# Patient Record
Sex: FEMALE | Race: WHITE | NOT HISPANIC OR LATINO | Employment: OTHER | URBAN - METROPOLITAN AREA
[De-identification: names, ages, dates, MRNs, and addresses within clinical notes are randomized per-mention and may not be internally consistent; named-entity substitution may affect disease eponyms.]

---

## 2017-03-03 ENCOUNTER — HOSPITAL ENCOUNTER (OUTPATIENT)
Dept: RADIOLOGY | Facility: MEDICAL CENTER | Age: 52
End: 2017-03-03

## 2017-03-14 ENCOUNTER — HOSPITAL ENCOUNTER (OUTPATIENT)
Dept: RADIOLOGY | Facility: MEDICAL CENTER | Age: 52
End: 2017-03-14
Attending: OTOLARYNGOLOGY
Payer: COMMERCIAL

## 2017-03-14 DIAGNOSIS — E04.2 NONTOXIC MULTINODULAR GOITER: ICD-10-CM

## 2017-03-14 DIAGNOSIS — C82.81 OTHER TYPE OF FOLLICULAR LYMPHOMA OF LYMPH NODES OF NECK (HCC): ICD-10-CM

## 2017-03-14 PROCEDURE — 88112 CYTOPATH CELL ENHANCE TECH: CPT | Mod: 91

## 2017-03-14 PROCEDURE — 10022 HCHG FINE NEEDLE ASP W/IMAGING GUIDANCE: CPT

## 2017-03-14 PROCEDURE — 76942 ECHO GUIDE FOR BIOPSY: CPT

## 2017-03-14 NOTE — PROGRESS NOTES
US guided bilateral thyroid nodule fine needle aspiration done by Dr. Serra; bilateral anterior aspect of neck access site; 2 jars of cytolyt obtained and sent to pathology lab; pt tolerated the procedure well; pt hemodynamically stable pre/intra/post procedure; all questions and concerns answered prior to being d/c; patient provided with appropriate education for procedure; pt d/c home.

## 2017-03-17 ENCOUNTER — HOSPITAL ENCOUNTER (OUTPATIENT)
Dept: RADIOLOGY | Facility: MEDICAL CENTER | Age: 52
End: 2017-03-17
Attending: OTOLARYNGOLOGY
Payer: COMMERCIAL

## 2017-03-17 DIAGNOSIS — E04.2 NONTOXIC MULTINODULAR GOITER: ICD-10-CM

## 2017-03-28 ENCOUNTER — HOSPITAL ENCOUNTER (OUTPATIENT)
Dept: RADIOLOGY | Facility: MEDICAL CENTER | Age: 52
End: 2017-03-28
Attending: OTOLARYNGOLOGY
Payer: COMMERCIAL

## 2017-03-28 PROCEDURE — 70491 CT SOFT TISSUE NECK W/DYE: CPT

## 2017-03-28 PROCEDURE — 700117 HCHG RX CONTRAST REV CODE 255: Performed by: OTOLARYNGOLOGY

## 2017-03-28 RX ADMIN — IOHEXOL 100 ML: 350 INJECTION, SOLUTION INTRAVENOUS at 11:37

## 2019-08-11 ENCOUNTER — HOSPITAL ENCOUNTER (INPATIENT)
Facility: MEDICAL CENTER | Age: 54
LOS: 4 days | DRG: 300 | End: 2019-08-15
Attending: EMERGENCY MEDICINE | Admitting: INTERNAL MEDICINE
Payer: COMMERCIAL

## 2019-08-11 DIAGNOSIS — I82.422 ACUTE DEEP VEIN THROMBOSIS (DVT) OF ILIAC VEIN OF LEFT LOWER EXTREMITY (HCC): ICD-10-CM

## 2019-08-11 DIAGNOSIS — I82.492 DEEP VEIN THROMBOSIS (DVT) OF OTHER VEIN OF LEFT LOWER EXTREMITY, UNSPECIFIED CHRONICITY (HCC): ICD-10-CM

## 2019-08-11 PROBLEM — I82.409 DVT (DEEP VENOUS THROMBOSIS) (HCC): Status: ACTIVE | Noted: 2019-08-11

## 2019-08-11 PROBLEM — E11.65 DIABETES MELLITUS WITH HYPERGLYCEMIA (HCC): Status: ACTIVE | Noted: 2019-08-11

## 2019-08-11 PROBLEM — E03.9 HYPOTHYROIDISM: Status: ACTIVE | Noted: 2019-08-11

## 2019-08-11 PROBLEM — N17.9 ACUTE KIDNEY FAILURE (HCC): Status: ACTIVE | Noted: 2019-08-11

## 2019-08-11 PROCEDURE — A9270 NON-COVERED ITEM OR SERVICE: HCPCS | Performed by: INTERNAL MEDICINE

## 2019-08-11 PROCEDURE — 85303 CLOT INHIBIT PROT C ACTIVITY: CPT

## 2019-08-11 PROCEDURE — 700102 HCHG RX REV CODE 250 W/ 637 OVERRIDE(OP): Performed by: INTERNAL MEDICINE

## 2019-08-11 PROCEDURE — 36415 COLL VENOUS BLD VENIPUNCTURE: CPT

## 2019-08-11 PROCEDURE — 81241 F5 GENE: CPT

## 2019-08-11 PROCEDURE — 770020 HCHG ROOM/CARE - TELE (206)

## 2019-08-11 PROCEDURE — 85306 CLOT INHIBIT PROT S FREE: CPT

## 2019-08-11 PROCEDURE — 99223 1ST HOSP IP/OBS HIGH 75: CPT | Performed by: INTERNAL MEDICINE

## 2019-08-11 PROCEDURE — 85300 ANTITHROMBIN III ACTIVITY: CPT

## 2019-08-11 PROCEDURE — 99285 EMERGENCY DEPT VISIT HI MDM: CPT

## 2019-08-11 PROCEDURE — 85301 ANTITHROMBIN III ANTIGEN: CPT

## 2019-08-11 RX ORDER — HYDROMORPHONE HYDROCHLORIDE 1 MG/ML
0.5 INJECTION, SOLUTION INTRAMUSCULAR; INTRAVENOUS; SUBCUTANEOUS
Status: DISCONTINUED | OUTPATIENT
Start: 2019-08-11 | End: 2019-08-15 | Stop reason: HOSPADM

## 2019-08-11 RX ORDER — PROMETHAZINE HYDROCHLORIDE 25 MG/1
12.5-25 TABLET ORAL EVERY 4 HOURS PRN
Status: DISCONTINUED | OUTPATIENT
Start: 2019-08-11 | End: 2019-08-15 | Stop reason: HOSPADM

## 2019-08-11 RX ORDER — BISACODYL 10 MG
10 SUPPOSITORY, RECTAL RECTAL
Status: DISCONTINUED | OUTPATIENT
Start: 2019-08-11 | End: 2019-08-15 | Stop reason: HOSPADM

## 2019-08-11 RX ORDER — OMEPRAZOLE 20 MG/1
20 CAPSULE, DELAYED RELEASE ORAL DAILY
Status: DISCONTINUED | OUTPATIENT
Start: 2019-08-12 | End: 2019-08-11

## 2019-08-11 RX ORDER — ENALAPRILAT 1.25 MG/ML
1.25 INJECTION INTRAVENOUS EVERY 6 HOURS PRN
Status: DISCONTINUED | OUTPATIENT
Start: 2019-08-11 | End: 2019-08-15 | Stop reason: HOSPADM

## 2019-08-11 RX ORDER — GLIPIZIDE 5 MG/1
5 TABLET ORAL EVERY MORNING
COMMUNITY
End: 2021-06-15

## 2019-08-11 RX ORDER — ONDANSETRON 2 MG/ML
4 INJECTION INTRAMUSCULAR; INTRAVENOUS EVERY 4 HOURS PRN
Status: DISCONTINUED | OUTPATIENT
Start: 2019-08-11 | End: 2019-08-15 | Stop reason: HOSPADM

## 2019-08-11 RX ORDER — IBUPROFEN 200 MG
600 TABLET ORAL
Status: ON HOLD | COMMUNITY
End: 2019-08-15

## 2019-08-11 RX ORDER — AMOXICILLIN 250 MG
2 CAPSULE ORAL 2 TIMES DAILY
Status: DISCONTINUED | OUTPATIENT
Start: 2019-08-12 | End: 2019-08-15 | Stop reason: HOSPADM

## 2019-08-11 RX ORDER — OXYCODONE HYDROCHLORIDE 5 MG/1
5 TABLET ORAL
Status: DISCONTINUED | OUTPATIENT
Start: 2019-08-11 | End: 2019-08-13

## 2019-08-11 RX ORDER — ACETAMINOPHEN 325 MG/1
650 TABLET ORAL EVERY 6 HOURS PRN
Status: DISCONTINUED | OUTPATIENT
Start: 2019-08-11 | End: 2019-08-15 | Stop reason: HOSPADM

## 2019-08-11 RX ORDER — POLYETHYLENE GLYCOL 3350 17 G/17G
1 POWDER, FOR SOLUTION ORAL
Status: DISCONTINUED | OUTPATIENT
Start: 2019-08-11 | End: 2019-08-15 | Stop reason: HOSPADM

## 2019-08-11 RX ORDER — PROMETHAZINE HYDROCHLORIDE 25 MG/1
12.5-25 SUPPOSITORY RECTAL EVERY 4 HOURS PRN
Status: DISCONTINUED | OUTPATIENT
Start: 2019-08-11 | End: 2019-08-15 | Stop reason: HOSPADM

## 2019-08-11 RX ORDER — LEVOTHYROXINE SODIUM 0.1 MG/1
100 TABLET ORAL
COMMUNITY

## 2019-08-11 RX ORDER — LEVOTHYROXINE SODIUM 0.1 MG/1
100 TABLET ORAL
Status: DISCONTINUED | OUTPATIENT
Start: 2019-08-12 | End: 2019-08-15 | Stop reason: HOSPADM

## 2019-08-11 RX ORDER — ONDANSETRON 4 MG/1
4 TABLET, ORALLY DISINTEGRATING ORAL EVERY 4 HOURS PRN
Status: DISCONTINUED | OUTPATIENT
Start: 2019-08-11 | End: 2019-08-15 | Stop reason: HOSPADM

## 2019-08-11 RX ORDER — OXYCODONE HYDROCHLORIDE 10 MG/1
10 TABLET ORAL
Status: DISCONTINUED | OUTPATIENT
Start: 2019-08-11 | End: 2019-08-13

## 2019-08-11 RX ADMIN — OXYCODONE HYDROCHLORIDE 5 MG: 5 TABLET ORAL at 22:11

## 2019-08-11 ASSESSMENT — ENCOUNTER SYMPTOMS
HEADACHES: 0
FEVER: 0
PALPITATIONS: 0
TINGLING: 0
STRIDOR: 0
FALLS: 0
NAUSEA: 0
CHILLS: 0
SPUTUM PRODUCTION: 0
LOSS OF CONSCIOUSNESS: 0
DIARRHEA: 0
WEAKNESS: 0
MYALGIAS: 0
SHORTNESS OF BREATH: 0
ABDOMINAL PAIN: 0
DIZZINESS: 0
CONSTIPATION: 0
VOMITING: 0
COUGH: 0
DEPRESSION: 0

## 2019-08-12 PROBLEM — N17.9 ACUTE KIDNEY FAILURE (HCC): Status: RESOLVED | Noted: 2019-08-11 | Resolved: 2019-08-12

## 2019-08-12 LAB
ANION GAP SERPL CALC-SCNC: 10 MMOL/L (ref 0–11.9)
BUN SERPL-MCNC: 17 MG/DL (ref 8–22)
CALCIUM SERPL-MCNC: 9.2 MG/DL (ref 8.5–10.5)
CHLORIDE SERPL-SCNC: 102 MMOL/L (ref 96–112)
CO2 SERPL-SCNC: 25 MMOL/L (ref 20–33)
CREAT SERPL-MCNC: 1.09 MG/DL (ref 0.5–1.4)
ERYTHROCYTE [DISTWIDTH] IN BLOOD BY AUTOMATED COUNT: 43.1 FL (ref 35.9–50)
GLUCOSE BLD-MCNC: 193 MG/DL (ref 65–99)
GLUCOSE BLD-MCNC: 195 MG/DL (ref 65–99)
GLUCOSE BLD-MCNC: 217 MG/DL (ref 65–99)
GLUCOSE BLD-MCNC: 233 MG/DL (ref 65–99)
GLUCOSE SERPL-MCNC: 251 MG/DL (ref 65–99)
HCT VFR BLD AUTO: 40.3 % (ref 37–47)
HGB BLD-MCNC: 13.2 G/DL (ref 12–16)
MCH RBC QN AUTO: 28.9 PG (ref 27–33)
MCHC RBC AUTO-ENTMCNC: 32.8 G/DL (ref 33.6–35)
MCV RBC AUTO: 88.4 FL (ref 81.4–97.8)
PLATELET # BLD AUTO: 128 K/UL (ref 164–446)
PMV BLD AUTO: 9.2 FL (ref 9–12.9)
POTASSIUM SERPL-SCNC: 3.9 MMOL/L (ref 3.6–5.5)
RBC # BLD AUTO: 4.56 M/UL (ref 4.2–5.4)
SODIUM SERPL-SCNC: 137 MMOL/L (ref 135–145)
WBC # BLD AUTO: 6.3 K/UL (ref 4.8–10.8)

## 2019-08-12 PROCEDURE — 85027 COMPLETE CBC AUTOMATED: CPT

## 2019-08-12 PROCEDURE — 99232 SBSQ HOSP IP/OBS MODERATE 35: CPT | Performed by: INTERNAL MEDICINE

## 2019-08-12 PROCEDURE — 770020 HCHG ROOM/CARE - TELE (206)

## 2019-08-12 PROCEDURE — 36415 COLL VENOUS BLD VENIPUNCTURE: CPT

## 2019-08-12 PROCEDURE — 700102 HCHG RX REV CODE 250 W/ 637 OVERRIDE(OP): Performed by: INTERNAL MEDICINE

## 2019-08-12 PROCEDURE — 700111 HCHG RX REV CODE 636 W/ 250 OVERRIDE (IP): Performed by: INTERNAL MEDICINE

## 2019-08-12 PROCEDURE — 82962 GLUCOSE BLOOD TEST: CPT | Mod: 91

## 2019-08-12 PROCEDURE — A9270 NON-COVERED ITEM OR SERVICE: HCPCS | Performed by: INTERNAL MEDICINE

## 2019-08-12 PROCEDURE — 80048 BASIC METABOLIC PNL TOTAL CA: CPT

## 2019-08-12 RX ADMIN — INSULIN HUMAN 3 UNITS: 100 INJECTION, SOLUTION PARENTERAL at 12:28

## 2019-08-12 RX ADMIN — ENOXAPARIN SODIUM 80 MG: 100 INJECTION SUBCUTANEOUS at 17:13

## 2019-08-12 RX ADMIN — SENNOSIDES, DOCUSATE SODIUM 2 TABLET: 50; 8.6 TABLET, FILM COATED ORAL at 17:13

## 2019-08-12 RX ADMIN — OXYCODONE HYDROCHLORIDE 5 MG: 5 TABLET ORAL at 07:40

## 2019-08-12 RX ADMIN — INSULIN HUMAN 6 UNITS: 100 INJECTION, SOLUTION PARENTERAL at 08:22

## 2019-08-12 RX ADMIN — INSULIN HUMAN 6 UNITS: 100 INJECTION, SOLUTION PARENTERAL at 17:11

## 2019-08-12 RX ADMIN — INSULIN HUMAN 3 UNITS: 100 INJECTION, SOLUTION PARENTERAL at 21:13

## 2019-08-12 RX ADMIN — OXYCODONE HYDROCHLORIDE 5 MG: 5 TABLET ORAL at 16:03

## 2019-08-12 RX ADMIN — LEVOTHYROXINE SODIUM 100 MCG: 100 TABLET ORAL at 05:50

## 2019-08-12 RX ADMIN — POLYETHYLENE GLYCOL 3350 1 PACKET: 17 POWDER, FOR SOLUTION ORAL at 22:06

## 2019-08-12 RX ADMIN — ENOXAPARIN SODIUM 80 MG: 100 INJECTION SUBCUTANEOUS at 05:50

## 2019-08-12 RX ADMIN — OXYCODONE HYDROCHLORIDE 5 MG: 5 TABLET ORAL at 21:02

## 2019-08-12 RX ADMIN — SENNOSIDES, DOCUSATE SODIUM 2 TABLET: 50; 8.6 TABLET, FILM COATED ORAL at 05:50

## 2019-08-12 NOTE — H&P
Hospital Medicine History & Physical Note    Date of Service  8/11/2019    Primary Care Physician  FARIDEH Granados    Consultants  None    Code Status  Full    Chief Complaint  Left leg swelling    History of Presenting Illness  54 y.o. female who presented 8/11/2019 with left leg swelling.  She states on Friday morning she woke up with a swollen left leg and mild pressure associated with the swelling.  This continued to worsen so later that day she went to urgent care, was sent to the emergency department where they did an ultrasound that was negative so she was sent home.  Pain and swelling continued to worsen and today she states she was walk so she went back to the emergency department.  Emergency department was in [place], she did have a work-up there was noted to have a profound left clot encompassing the entire left leg.  There they felt she may need an IVC filter so she was transferred here.  I did discuss the case including labs and imaging with the ER physician.    Review of Systems  Review of Systems   Constitutional: Negative for chills, fever and malaise/fatigue.   HENT: Negative for congestion.    Respiratory: Negative for cough, sputum production, shortness of breath and stridor.    Cardiovascular: Positive for leg swelling (left ). Negative for chest pain and palpitations.   Gastrointestinal: Negative for abdominal pain, constipation, diarrhea, nausea and vomiting.   Genitourinary: Negative for dysuria and urgency.   Musculoskeletal: Negative for falls and myalgias.        Left leg pain   Neurological: Negative for dizziness, tingling, loss of consciousness, weakness and headaches.   Psychiatric/Behavioral: Negative for depression and suicidal ideas.   All other systems reviewed and are negative.      Past Medical History   has a past medical history of Acid reflux, Anesthesia, Cancer (1997), Personal history of venous thrombosis and embolism, Snoring, Tuberculosis, and Unspecified disorder of  thyroid.    Surgical History   has a past surgical history that includes lymph node excision (1997); [facility] cath, port-a-cath (1997); edelmira by laparoscopy (3/1/2012); ankle arthroscopy (12/11/2013); and ankle ligament reconstruction (12/11/2013).     Family History  family history includes Cancer in her unknown relative; Diabetes in her unknown relative.     Social History   reports that she has never smoked. She has never used smokeless tobacco. She reports that she does not drink alcohol or use drugs.    Allergies  Allergies   Allergen Reactions   • Compazine      agitation   • Iodine Hives   • Morphine Vomiting   • Penicillins      Reaction unknown   • Tape Rash     Paper tape OK   • Vicodin [Hydrocodone-Acetaminophen] Itching       Medications  Prior to Admission Medications   Prescriptions Last Dose Informant Patient Reported? Taking?   omeprazole (PRILOSEC) 20 MG CPDR  Patient Yes No   Sig: Take 20 mg by mouth every day. Indications: Gastroesophageal Reflux Disease with Current Symptoms      Facility-Administered Medications: None       Physical Exam  Temp:  [38.1 °C (100.5 °F)] 38.1 °C (100.5 °F)  Pulse:  [99] 99  Resp:  [18] 18  BP: (162)/(86) 162/86  SpO2:  [95 %] 95 %    Physical Exam   Constitutional: She is oriented to person, place, and time. She appears well-developed. She is cooperative. No distress.   HENT:   Head: Normocephalic and atraumatic. Not macrocephalic and not microcephalic. Head is without raccoon's eyes and without Figueroa's sign.   Right Ear: External ear normal.   Left Ear: External ear normal.   Mouth/Throat: Oropharynx is clear and moist. No oropharyngeal exudate.   Eyes: Conjunctivae are normal. Right eye exhibits no discharge. Left eye exhibits no discharge. No scleral icterus.   Neck: Neck supple. No tracheal deviation present.   Cardiovascular: Normal rate, regular rhythm and intact distal pulses. Exam reveals no gallop, no distant heart sounds and no friction rub.   No murmur  heard.  Pulmonary/Chest: Effort normal. No accessory muscle usage or stridor. No tachypnea and no bradypnea. No respiratory distress. She has no decreased breath sounds. She has no wheezes. She has no rhonchi. She has no rales. She exhibits no tenderness.   Abdominal: Soft. Bowel sounds are normal. She exhibits no distension. There is no hepatosplenomegaly, splenomegaly or hepatomegaly. There is no tenderness. There is no rebound and no guarding.   Musculoskeletal: Normal range of motion. She exhibits edema (left leg) and tenderness (left leg).   Lymphadenopathy:     She has no cervical adenopathy.   Neurological: She is alert and oriented to person, place, and time. No cranial nerve deficit. She displays no seizure activity.   Skin: Skin is warm, dry and intact. No rash noted. She is not diaphoretic. There is erythema (left leg). No pallor.   Psychiatric: She has a normal mood and affect. Her speech is normal and behavior is normal. Judgment and thought content normal. Cognition and memory are normal.   Nursing note and vitals reviewed.      Laboratory:    Labs here are pending, I was unable to locate the labs in the chart from the transferring facility, per ER note however the glucose is 282 and the creatinine was 1.1      No results for input(s): ALTSGPT, ASTSGOT, ALKPHOSPHAT, TBILIRUBIN, DBILIRUBIN, GAMMAGT, AMYLASE, LIPASE, ALB, PREALBUMIN, GLUCOSE in the last 72 hours.      No results for input(s): NTPROBNP in the last 72 hours.      No results for input(s): TROPONINT in the last 72 hours.    Urinalysis:    No results found     Imaging:  No orders to display         Assessment/Plan:  I anticipate this patient will require at least two midnights for appropriate medical management, necessitating inpatient admission.    * DVT (deep venous thrombosis) (HCC)  Assessment & Plan  -Significant DVT in her left leg  -She does state her son is diagnosed with factor V Leiden  -She also states her father had multiple clots  during his lifetime before passing away, the first was at 17  -Start hypercoagulable work-up  -Start full dose Lovenox  -Profound clot, does have a risk of worsening, she does require close monitoring    Hypothyroidism  Assessment & Plan  -Continue home Synthroid at 100 mcg daily  -No recent TSH  -Patient does not appear hyper or hypothyroid    Acute kidney failure (HCC)  Assessment & Plan  -Noted on labs from outlying facility  -At this point, she does not appear overly dehydrated  -She is tolerating a diet  -I do not feel she needs IV fluids at this time    Diabetes mellitus with hyperglycemia (HCC)  Assessment & Plan  -Hold home glipizide  -Start insulin sliding scale  -Adjust as needed      VTE prophylaxis: Lovenox

## 2019-08-12 NOTE — PROGRESS NOTES
2 RN skin check with CORINE Carvalho.    Pt skin is intact.    Redness and swelling on LLE d/t newly diagnosed DVT    Draw sheet and pillows for positioning. Pt turns self side to side.

## 2019-08-12 NOTE — ED PROVIDER NOTES
ED Provider Note    Scribed for Maya Gallo M.D. by Ronny Mccartney. 8/11/2019, 8:08 PM.    Primary care provider: FARIDEH Granados  Means of arrival: Aali[place]ah  History obtained from: Patient  History limited by: None    CHIEF COMPLAINT  Chief Complaint   Patient presents with   • Leg Pain   • Leg Swelling       [facility]  Haroldo Cameron is a 54 y.o. female who presents to the Emergency Department with acute, worsening left leg swelling onset 2 days ago. Patient states that she went to  last Friday after she woke up with leg swelling and was sent to ED. Per patient, US at that time showed no signs of DVT and was sent home. The next day she returned to  after the swelling increased to the point she could not walk and was sent here. Subcutaneous Lovenox was administered PTA and US from  showed sever, extensive DVT in the left lower extremity. Upon arrival patient has associated fever, but denies any associated shortness of breath of chest pain. There are no known alleviating or exacerbating factors. Patient states that she does not had a history of blood clots, however she notes that her dad did. She notes that she had just recently started being medicated for diabetes.     REVIEW OF SYSTEMS  Pertinent positives include left leg swelling and fever. Pertinent negatives include no chest pain or shortness of breath. As above, all other systems reviewed and are negative.   See HPI for further details.     PAST MEDICAL HISTORY  Past Medical History:   Diagnosis Date   • Acid reflux    • Anesthesia     post op n/v   • Cancer 1997    Hodgkins Lymphoma   • Personal history of venous thrombosis and embolism     arm, due to IV from  chemo   • Snoring    • Tuberculosis     skin test (+), cxr (-)   • Unspecified disorder of thyroid     hypothyroid       SURGICAL HISTORY  Past Surgical History:   Procedure Laterality Date   • ANKLE ARTHROSCOPY  12/11/2013    Performed by Yao Del Cid M.D. at [facility]  "[place] ORS   • ANKLE LIGAMENT RECONSTRUCTION  12/11/2013    Performed by Yao Del Cid M.D. at SURGERY [facility] ORS   • RAUL BY LAPAROSCOPY  3/1/2012    Performed by KELLY QUINTERO at SURGERY [facility]   • LYMPH NODE EXCISION  1997    neck   • CATH, PORT-A-CATH  1997       SOCIAL HISTORY  Social History     Tobacco Use   • Smoking status: Never Smoker   • Smokeless tobacco: Never Used   Substance Use Topics   • Alcohol use: No   • Drug use: No      Social History     Substance and Sexual Activity   Drug Use No       FAMILY HISTORY  Family History   Problem Relation Age of Onset   • Cancer Unknown    • Diabetes Unknown        CURRENT MEDICATIONS  Home Medications     Reviewed by Roberto Gant (Pharmacy Tech) on 08/11/19 at 2054  Med List Status: Complete   Medication Last Dose Status   glipiZIDE (GLUCOTROL) 5 MG Tab 8/8/2019 Active   ibuprofen (MOTRIN) 200 MG Tab unknown Active   levothyroxine (SYNTHROID) 100 MCG Tab 8/8/2019 Active                ALLERGIES  Allergies   Allergen Reactions   • Compazine      agitation   • Iodine Hives   • Morphine Vomiting   • Penicillins      Reaction unknown   • Tape Rash     Paper tape OK   • Vicodin [Hydrocodone-Acetaminophen] Itching       PHYSICAL EXAM  VITAL SIGNS: BP (!) 162/86   Pulse 99   Temp (!) 38.1 °C (100.5 °F) (Temporal)   Resp 18   Ht 1.676 m (5' 6\")   Wt 81.6 kg (180 lb)   SpO2 95%   BMI 29.05 kg/m²   Vitals reviewed.    Consitutional: Well-developed, well-nourished. Negative for: distress.  HENT: Normocephalic, right external ear normal, left external ear normal, oropharynx clear and moist.  Eyes: Conjunctivae normal, extraocular movements normal. Negative for: discharge in right and left eye, icterus.  Neck: Range of motion normal, supple. Negative for cervical adenopathy.  Cardiovascular: Normal rate, regular rhythm, heart sounds normal, intact distal pulses. Negative for: murmur, rub, gallop.  Pulmonary/Chest Wall: Effort normal, breath " sounds normal. Negative for: respiratory distress, wheezes, rales, rhonchi.   Abdominal: Mild tenderness to left lower quadrant. Soft, bowel sounds normal. Negative for: distention, rebound, guarding.  Musculoskeletal: Extensive ptting and non-pitting edema to left lower extremity. Some erythema and heat to posterior medical aspect is noted. Normal range of motion. Trace bilateral DP and TP pulses.   Neurological: Alert and oriented x3. No focal deficits.  Sensation is intact left foot  Skin: Warm, dry. Negative for rash.  Psych: Mood/affect normal, behavior normal, judgment normal.      COURSE & MEDICAL DECISION MAKING  Nursing notes, VS, PMSFHx reviewed in chart.    Obtained and reviewed past medical records from 8/11/2019 which indicated severe, extensive DVT in left lower extremity from external iliac to peroneal veins. Unremarkable CBC. CMP glucose was 282 with creatinine of 1.1.    8:08 PM Patient seen and examined at bedside. The patient presents with known extensive left lower extremity DVT and the differential diagnosis includes but is not limited to factor V Leiden deficiency.  No history to indicate current PE.  Patient was already treated with Lovenox.    8:19 PM I discussed the patient's case and the above findings with Dr. Duran (Hospitalist) who agrees to admit the patient into his care.       FINAL IMPRESSION  1. Acute deep vein thrombosis (DVT) of iliac vein of left lower extremity (HCC)         Maya OLIVAS M.D. personally performed the services described in this documentation, as scribed by Rnony Mccartney in my presence, and it is both accurate and complete.    C.    The note accurately reflects work and decisions made by me.  Maya Gallo  8/11/2019  9:03 PM

## 2019-08-12 NOTE — ASSESSMENT & PLAN NOTE
Family history of clotting. Son with Factor V Leiden. Significant DVT in her left leg. Hemodynamically stable and saturating well on RA. Hemoglobin stable.  - s/p therapeutic lovenox, now on xarelto (pre auth done and not cost prohibitive)   - continue to ambulate  - Hypercoagulable work-up will need to be continued in the outpatient setting.    Factor V leiden, Protein C and S in process

## 2019-08-12 NOTE — ED NOTES
Pt reports as a transfer for diagnosed LLE DVT. Pt with initial symptom onset on  Friday, initial workup on Friday was negative, pt presented to  today with increased symptoms. BOOGIE today was + for LLE DVT. Pt was given Lovenox at previous facility. Pain, redness, and swelling present from mid thigh to foot. Pt ambulated safely from [facility] to [facility].

## 2019-08-12 NOTE — PROGRESS NOTES
Hospital Medicine Daily Progress Note    Date of Service  8/12/2019    Chief Complaint  54 y.o. female admitted 8/11/2019 with LLE swelling/pain/inability to walk well since 8/9/19    Hospital Course    Hx reviewed and discussed with patient/family; L leg swelling/discomfort about the same; no sob/chest pain.      Interval Problem Update  As above    Consultants/Specialty  none    Code Status  full    Disposition  Home when stable    Review of Systems  ROS     Physical Exam  Temp:  [36.2 °C (97.1 °F)-38.1 °C (100.5 °F)] 36.9 °C (98.4 °F)  Pulse:  [81-99] 90  Resp:  [17-27] 18  BP: (115-162)/(68-86) 125/73  SpO2:  [93 %-97 %] 93 %    Physical Exam   Constitutional: She is oriented to person, place, and time. No distress.   HENT:   Head: Normocephalic.   Eyes: EOM are normal.   Neck: Neck supple.   Cardiovascular: Normal rate and regular rhythm.   Pulmonary/Chest: Effort normal and breath sounds normal.   Abdominal: Soft. Bowel sounds are normal. She exhibits no distension. There is no tenderness.   Musculoskeletal: She exhibits edema.   Moderate swelling, tenderness of entire LLE   Neurological: She is alert and oriented to person, place, and time.   Skin: Skin is warm.   Psychiatric: Her behavior is normal.       Fluids    Intake/Output Summary (Last 24 hours) at 8/12/2019 1553  Last data filed at 8/12/2019 0400  Gross per 24 hour   Intake 200 ml   Output --   Net 200 ml       Laboratory  Recent Labs     08/12/19  0054   WBC 6.3   RBC 4.56   HEMOGLOBIN 13.2   HEMATOCRIT 40.3   MCV 88.4   MCH 28.9   MCHC 32.8*   RDW 43.1   PLATELETCT 128*   MPV 9.2     Recent Labs     08/12/19  0054   SODIUM 137   POTASSIUM 3.9   CHLORIDE 102   CO2 25   GLUCOSE 251*   BUN 17   CREATININE 1.09   CALCIUM 9.2                   Imaging  No orders to display        Assessment/Plan  * DVT (deep venous thrombosis) (HCC)  Assessment & Plan  -Significant DVT in her left leg, new event  -She does state her son is diagnosed with factor V  Sara  -She also states her father had multiple clots during his lifetime before passing away, the first was at 17  -Start hypercoagulable work-up, needs confirmation testing as outpatient later as some test results in acute phase may be inaccurate  -Started full dose Lovenox and change to Xarelto upon discharge.  -Profound clot, does have a risk of worsening, she does require close monitoring  Can go home in few days if leg pain/ambulation improve.    Hypothyroidism- (present on admission)  Assessment & Plan  -Continue home Synthroid at 100 mcg daily  -No recent TSH  -Patient does not appear hyper or hypothyroid    Diabetes mellitus with hyperglycemia (HCC)- (present on admission)  Assessment & Plan  -Hold home glipizide  -Start insulin sliding scale  -Adjust as needed         VTE prophylaxis: lovenox

## 2019-08-12 NOTE — PROGRESS NOTES
Assumed care at 0715. Bedside report received from Night CORINE Morelos. Patient's chart and MAR reviewed. 12 hour chart check complete. Assessment complete, pt 7-10 pain at this time, does not appear to be in distress. Pt is awake in bed. Pt is A & O x 4. Patient was updated on plan of care for the day. Questions answered and concerns addressed.  Pt denies any additional needs at this time. White board updated. Call light, phone and personal belongings within reach. Bed alarm on and working appropriately. Vital signs stable.

## 2019-08-12 NOTE — ASSESSMENT & PLAN NOTE
-Noted on labs from outlying facility  -At this point, she does not appear overly dehydrated  -She is tolerating a diet  -I do not feel she needs IV fluids at this time

## 2019-08-12 NOTE — ASSESSMENT & PLAN NOTE
A1C elevated at 9.2%. Patient states she just got a new glucometer and has no issues with using it. Apprehensive about starting insulin.  - resume home glipizide  - change to moderate sliding scale  - DM diet and hypoglycemia protocol  - DM education appreciated

## 2019-08-13 LAB
ANION GAP SERPL CALC-SCNC: 6 MMOL/L (ref 0–11.9)
AT III ACT/NOR PPP CHRO: 115 % (ref 76–128)
AT III AG ACT/NOR PPP IA: 96 % (ref 82–136)
BASOPHILS # BLD AUTO: 1.1 % (ref 0–1.8)
BASOPHILS # BLD: 0.06 K/UL (ref 0–0.12)
BUN SERPL-MCNC: 16 MG/DL (ref 8–22)
CALCIUM SERPL-MCNC: 9.4 MG/DL (ref 8.5–10.5)
CHLORIDE SERPL-SCNC: 103 MMOL/L (ref 96–112)
CO2 SERPL-SCNC: 26 MMOL/L (ref 20–33)
CREAT SERPL-MCNC: 1.21 MG/DL (ref 0.5–1.4)
EOSINOPHIL # BLD AUTO: 0.25 K/UL (ref 0–0.51)
EOSINOPHIL NFR BLD: 4.7 % (ref 0–6.9)
ERYTHROCYTE [DISTWIDTH] IN BLOOD BY AUTOMATED COUNT: 42.1 FL (ref 35.9–50)
GLUCOSE BLD-MCNC: 153 MG/DL (ref 65–99)
GLUCOSE BLD-MCNC: 177 MG/DL (ref 65–99)
GLUCOSE BLD-MCNC: 184 MG/DL (ref 65–99)
GLUCOSE SERPL-MCNC: 239 MG/DL (ref 65–99)
HCT VFR BLD AUTO: 38 % (ref 37–47)
HGB BLD-MCNC: 12.7 G/DL (ref 12–16)
IMM GRANULOCYTES # BLD AUTO: 0.07 K/UL (ref 0–0.11)
IMM GRANULOCYTES NFR BLD AUTO: 1.3 % (ref 0–0.9)
LYMPHOCYTES # BLD AUTO: 0.99 K/UL (ref 1–4.8)
LYMPHOCYTES NFR BLD: 18.5 % (ref 22–41)
MCH RBC QN AUTO: 29.3 PG (ref 27–33)
MCHC RBC AUTO-ENTMCNC: 33.4 G/DL (ref 33.6–35)
MCV RBC AUTO: 87.8 FL (ref 81.4–97.8)
MONOCYTES # BLD AUTO: 0.28 K/UL (ref 0–0.85)
MONOCYTES NFR BLD AUTO: 5.2 % (ref 0–13.4)
NEUTROPHILS # BLD AUTO: 3.71 K/UL (ref 2–7.15)
NEUTROPHILS NFR BLD: 69.2 % (ref 44–72)
NRBC # BLD AUTO: 0 K/UL
NRBC BLD-RTO: 0 /100 WBC
PLATELET # BLD AUTO: 131 K/UL (ref 164–446)
PMV BLD AUTO: 9.1 FL (ref 9–12.9)
POTASSIUM SERPL-SCNC: 4.1 MMOL/L (ref 3.6–5.5)
PROT C ACT/NOR PPP: 114 % (ref 83–168)
PROT S ACT/NOR PPP: 107 % (ref 57–131)
RBC # BLD AUTO: 4.33 M/UL (ref 4.2–5.4)
SODIUM SERPL-SCNC: 135 MMOL/L (ref 135–145)
WBC # BLD AUTO: 5.4 K/UL (ref 4.8–10.8)

## 2019-08-13 PROCEDURE — 36415 COLL VENOUS BLD VENIPUNCTURE: CPT

## 2019-08-13 PROCEDURE — A9270 NON-COVERED ITEM OR SERVICE: HCPCS | Performed by: HOSPITALIST

## 2019-08-13 PROCEDURE — 770020 HCHG ROOM/CARE - TELE (206)

## 2019-08-13 PROCEDURE — 82962 GLUCOSE BLOOD TEST: CPT

## 2019-08-13 PROCEDURE — 700102 HCHG RX REV CODE 250 W/ 637 OVERRIDE(OP): Performed by: INTERNAL MEDICINE

## 2019-08-13 PROCEDURE — 99232 SBSQ HOSP IP/OBS MODERATE 35: CPT | Performed by: HOSPITALIST

## 2019-08-13 PROCEDURE — 700102 HCHG RX REV CODE 250 W/ 637 OVERRIDE(OP): Performed by: HOSPITALIST

## 2019-08-13 PROCEDURE — 85025 COMPLETE CBC W/AUTO DIFF WBC: CPT

## 2019-08-13 PROCEDURE — 700111 HCHG RX REV CODE 636 W/ 250 OVERRIDE (IP): Performed by: INTERNAL MEDICINE

## 2019-08-13 PROCEDURE — 80048 BASIC METABOLIC PNL TOTAL CA: CPT

## 2019-08-13 PROCEDURE — A9270 NON-COVERED ITEM OR SERVICE: HCPCS | Performed by: INTERNAL MEDICINE

## 2019-08-13 RX ORDER — TRAMADOL HYDROCHLORIDE 50 MG/1
50 TABLET ORAL EVERY 4 HOURS PRN
Status: DISCONTINUED | OUTPATIENT
Start: 2019-08-13 | End: 2019-08-15 | Stop reason: HOSPADM

## 2019-08-13 RX ADMIN — INSULIN HUMAN 3 UNITS: 100 INJECTION, SOLUTION PARENTERAL at 08:26

## 2019-08-13 RX ADMIN — ENOXAPARIN SODIUM 80 MG: 100 INJECTION SUBCUTANEOUS at 18:21

## 2019-08-13 RX ADMIN — INSULIN HUMAN 3 UNITS: 100 INJECTION, SOLUTION PARENTERAL at 12:44

## 2019-08-13 RX ADMIN — TRAMADOL HYDROCHLORIDE 50 MG: 50 TABLET, FILM COATED ORAL at 18:21

## 2019-08-13 RX ADMIN — ONDANSETRON 4 MG: 2 INJECTION INTRAMUSCULAR; INTRAVENOUS at 22:25

## 2019-08-13 RX ADMIN — ENOXAPARIN SODIUM 80 MG: 100 INJECTION SUBCUTANEOUS at 05:36

## 2019-08-13 RX ADMIN — SENNOSIDES, DOCUSATE SODIUM 2 TABLET: 50; 8.6 TABLET, FILM COATED ORAL at 05:36

## 2019-08-13 RX ADMIN — LEVOTHYROXINE SODIUM 100 MCG: 100 TABLET ORAL at 05:36

## 2019-08-13 RX ADMIN — OXYCODONE HYDROCHLORIDE 5 MG: 5 TABLET ORAL at 03:57

## 2019-08-13 RX ADMIN — SENNOSIDES, DOCUSATE SODIUM 2 TABLET: 50; 8.6 TABLET, FILM COATED ORAL at 18:21

## 2019-08-13 RX ADMIN — INSULIN HUMAN 3 UNITS: 100 INJECTION, SOLUTION PARENTERAL at 18:22

## 2019-08-13 RX ADMIN — ACETAMINOPHEN 650 MG: 325 TABLET, FILM COATED ORAL at 16:29

## 2019-08-13 RX ADMIN — INSULIN HUMAN 3 UNITS: 100 INJECTION, SOLUTION PARENTERAL at 22:21

## 2019-08-13 RX ADMIN — OXYCODONE HYDROCHLORIDE 5 MG: 5 TABLET ORAL at 08:07

## 2019-08-13 RX ADMIN — TRAMADOL HYDROCHLORIDE 50 MG: 50 TABLET, FILM COATED ORAL at 22:18

## 2019-08-13 ASSESSMENT — ENCOUNTER SYMPTOMS
HEADACHES: 0
PSYCHIATRIC NEGATIVE: 1
FALLS: 0
VOMITING: 0
DIZZINESS: 0
PALPITATIONS: 0
MYALGIAS: 1
CHILLS: 0
NAUSEA: 0
ABDOMINAL PAIN: 0
LOSS OF CONSCIOUSNESS: 0
COUGH: 0
SHORTNESS OF BREATH: 0
FEVER: 0

## 2019-08-13 ASSESSMENT — PATIENT HEALTH QUESTIONNAIRE - PHQ9
2. FEELING DOWN, DEPRESSED, IRRITABLE, OR HOPELESS: NOT AT ALL
SUM OF ALL RESPONSES TO PHQ9 QUESTIONS 1 AND 2: 0
SUM OF ALL RESPONSES TO PHQ9 QUESTIONS 1 AND 2: 0
1. LITTLE INTEREST OR PLEASURE IN DOING THINGS: NOT AT ALL
1. LITTLE INTEREST OR PLEASURE IN DOING THINGS: NOT AT ALL
2. FEELING DOWN, DEPRESSED, IRRITABLE, OR HOPELESS: NOT AT ALL

## 2019-08-13 ASSESSMENT — LIFESTYLE VARIABLES
EVER FELT BAD OR GUILTY ABOUT YOUR DRINKING: NO
AVERAGE NUMBER OF DAYS PER WEEK YOU HAVE A DRINK CONTAINING ALCOHOL: 2
ON A TYPICAL DAY WHEN YOU DRINK ALCOHOL HOW MANY DRINKS DO YOU HAVE: 1
TOTAL SCORE: 0
EVER HAD A DRINK FIRST THING IN THE MORNING TO STEADY YOUR NERVES TO GET RID OF A HANGOVER: NO
HAVE YOU EVER FELT YOU SHOULD CUT DOWN ON YOUR DRINKING: NO
TOTAL SCORE: 0
HAVE PEOPLE ANNOYED YOU BY CRITICIZING YOUR DRINKING: NO
HOW MANY TIMES IN THE PAST YEAR HAVE YOU HAD 5 OR MORE DRINKS IN A DAY: 1
ALCOHOL_USE: YES
TOTAL SCORE: 0
CONSUMPTION TOTAL: POSITIVE

## 2019-08-13 ASSESSMENT — COGNITIVE AND FUNCTIONAL STATUS - GENERAL
DAILY ACTIVITIY SCORE: 24
SUGGESTED CMS G CODE MODIFIER DAILY ACTIVITY: CH
MOBILITY SCORE: 24
SUGGESTED CMS G CODE MODIFIER MOBILITY: CH

## 2019-08-13 NOTE — PROGRESS NOTES
Bedside report received report from previous nurse regarding prior 12 hours.  POC reviewed with pt.  White board updated.  Pt verbalizes understanding.  Call light within reach.

## 2019-08-13 NOTE — DISCHARGE PLANNING
Anticipated Discharge Disposition: home    Action: RN CM verified cost of xarelto at [facility] in [place], $44 total for both scripts of xarelto and will be available for  tomorrow. Patient's mother, Edison stating that they will be able to pay for the medication with no problem.    Barriers to Discharge: pending medical clearance    Plan: Patient to discharge home when medically cleared

## 2019-08-13 NOTE — CARE PLAN
Problem: Safety  Goal: Will remain free from injury  Outcome: PROGRESSING AS EXPECTED  Note:   Fall precautions in place. Bed in lowest position. Non-skid socks in place. Personal possessions within reach. Mobility sign on door. Bed-alarm on. Call light within reach. Pt educated regarding fall prevention and states understanding.        Problem: Knowledge Deficit  Goal: Knowledge of disease process/condition, treatment plan, diagnostic tests, and medications will improve  Outcome: PROGRESSING AS EXPECTED

## 2019-08-13 NOTE — DISCHARGE PLANNING
Care Transition Team Assessment     Anticipate no discharge needs.    Information Source  Orientation : Oriented x 4  Information Given By: Parent    Readmission Evaluation  Is this a readmission?: No    Elopement Risk  Legal Hold: No  Ambulatory or Self Mobile in Wheelchair: Yes  Disoriented: No  Psychiatric Symptoms: None  History of Wandering: No  Elopement this Admit: No  Vocalizing Wanting to Leave: No  Displays Behaviors, Body Language Wanting to Leave: No-Not at Risk for Elopement  Elopement Risk: Not at Risk for Elopement    Interdisciplinary Discharge Planning  Does Admitting Nurse Feel This Could be a Complex Discharge?: No  Primary Care Physician: Lisa Roberts  Support Systems: Parent  Able to Return to Previous ADL's: Yes  Mobility Issues: No  Prior Services: None  Patient Expects to be Discharged to:: home  Assistance Needed: No  Durable Medical Equipment: Not Applicable    Discharge Preparedness  What is your plan after discharge?: Home with help  What are your discharge supports?: Parent  Prior Functional Level: Ambulatory, Independent with Activities of Daily Living, Independent with Medication Management  Difficulity with ADLs: None  Difficulity with IADLs: None    Functional Assesment  Prior Functional Level: Ambulatory, Independent with Activities of Daily Living, Independent with Medication Management    Finances  Financial Barriers to Discharge: No  Prescription Coverage: Yes    Vision / Hearing Impairment  Right Eye Vision: Impaired, Wears Glasses  Left Eye Vision: Impaired, Wears Glasses    Domestic Abuse  Have you ever been the victim of abuse or violence?: No    Discharge Risks or Barriers  Discharge risks or barriers?: No    Anticipated Discharge Information  Anticipated discharge disposition: Home  Discharge Address: [place] [address]  Discharge Contact Phone Number: [phone]

## 2019-08-13 NOTE — PROGRESS NOTES
[facility] Medicine Daily Progress Note    Date of Service  8/13/2019    Chief Complaint  54 y.o. female admitted 8/11/2019 with LLE swelling/pain/inability to walk well since 8/9/19    Hospital Course    Hx reviewed and discussed with patient/family; L leg swelling/discomfort about the same; no sob/chest pain.      Interval Problem Update  With left leg and groin discomfort. Was able to ambulate with FWW, per patient. No acute overnight events.    Consultants/Specialty  none    Code Status  full    Disposition  Anticipate home tomorrow.    Review of Systems  Review of Systems   Constitutional: Positive for malaise/fatigue. Negative for chills and fever.   Respiratory: Negative for cough and shortness of breath.    Cardiovascular: Positive for leg swelling. Negative for chest pain and palpitations.   Gastrointestinal: Negative for abdominal pain, nausea and vomiting.   Genitourinary: Negative for dysuria.   Musculoskeletal: Positive for myalgias (left leg). Negative for falls.   Neurological: Negative for dizziness, loss of consciousness and headaches.   Psychiatric/Behavioral: Negative.    All other systems reviewed and are negative.       Physical Exam  Temp:  [36.6 °C (97.9 °F)-37.3 °C (99.1 °F)] 36.8 °C (98.2 °F)  Pulse:  [79-95] 79  Resp:  [18] 18  BP: (108-125)/(65-73) 108/70  SpO2:  [93 %-96 %] 93 %    Physical Exam   Constitutional: She is oriented to person, place, and time. She appears well-developed. No distress.   HENT:   Head: Normocephalic.   Mouth/Throat: Oropharynx is clear and moist.   Eyes: Pupils are equal, round, and reactive to light. EOM are normal. No scleral icterus.   Neck: Normal range of motion. Neck supple. No tracheal deviation present.   Cardiovascular: Normal rate, regular rhythm and intact distal pulses.   Pulmonary/Chest: Effort normal and breath sounds normal. No respiratory distress. She has no rales.   Abdominal: Soft. She exhibits no distension. There is no tenderness.    Musculoskeletal: She exhibits edema (left leg, improved) and tenderness (improving).   Moderate swelling, tenderness of entire LLE   Neurological: She is alert and oriented to person, place, and time.   Skin: Skin is warm and dry.   Psychiatric: She has a normal mood and affect. Her behavior is normal.   Vitals reviewed.      Fluids  No intake or output data in the 24 hours ending 08/13/19 0808    Laboratory  Recent Labs     08/12/19 0054 08/13/19 0219   WBC 6.3 5.4   RBC 4.56 4.33   HEMOGLOBIN 13.2 12.7   HEMATOCRIT 40.3 38.0   MCV 88.4 87.8   MCH 28.9 29.3   MCHC 32.8* 33.4*   RDW 43.1 42.1   PLATELETCT 128* 131*   MPV 9.2 9.1     Recent Labs     08/12/19 0054 08/13/19 0219   SODIUM 137 135   POTASSIUM 3.9 4.1   CHLORIDE 102 103   CO2 25 26   GLUCOSE 251* 239*   BUN 17 16   CREATININE 1.09 1.21   CALCIUM 9.2 9.4                   Imaging  No orders to display        Assessment/Plan  * DVT (deep venous thrombosis) (HCC)- (present on admission)  Assessment & Plan  Family history of clotting. Son with Factor V Leiden. Significant DVT in her left leg  - therapeutic lovenox  - pre authorization for xarelto sent, affordable  - continue to ambulate  - Hypercoagulable work-up will need to be continued in the outpatient setting. Factor V leiden, Protein C and S pending.     Diabetes mellitus with hyperglycemia (HCC)- (present on admission)  Assessment & Plan  - Hold home glipizide  - resistant sliding scale  - DM diet and hypoglycemia protocol    Hypothyroidism- (present on admission)  Assessment & Plan  - Continue home Synthroid at 100 mcg daily  - outpatient follow up       VTE prophylaxis: lovenox, will start xarelto.

## 2019-08-14 LAB
ANION GAP SERPL CALC-SCNC: 9 MMOL/L (ref 0–11.9)
BUN SERPL-MCNC: 12 MG/DL (ref 8–22)
CALCIUM SERPL-MCNC: 9 MG/DL (ref 8.5–10.5)
CHLORIDE SERPL-SCNC: 103 MMOL/L (ref 96–112)
CO2 SERPL-SCNC: 24 MMOL/L (ref 20–33)
CREAT SERPL-MCNC: 0.99 MG/DL (ref 0.5–1.4)
EST. AVERAGE GLUCOSE BLD GHB EST-MCNC: 217 MG/DL
GLUCOSE BLD-MCNC: 137 MG/DL (ref 65–99)
GLUCOSE BLD-MCNC: 144 MG/DL (ref 65–99)
GLUCOSE BLD-MCNC: 145 MG/DL (ref 65–99)
GLUCOSE BLD-MCNC: 155 MG/DL (ref 65–99)
GLUCOSE BLD-MCNC: 181 MG/DL (ref 65–99)
GLUCOSE SERPL-MCNC: 180 MG/DL (ref 65–99)
HBA1C MFR BLD: 9.2 % (ref 0–5.6)
HCT VFR BLD AUTO: 39.3 % (ref 37–47)
HGB BLD-MCNC: 13.3 G/DL (ref 12–16)
POTASSIUM SERPL-SCNC: 3.9 MMOL/L (ref 3.6–5.5)
SODIUM SERPL-SCNC: 136 MMOL/L (ref 135–145)

## 2019-08-14 PROCEDURE — 80048 BASIC METABOLIC PNL TOTAL CA: CPT

## 2019-08-14 PROCEDURE — 83036 HEMOGLOBIN GLYCOSYLATED A1C: CPT

## 2019-08-14 PROCEDURE — 82962 GLUCOSE BLOOD TEST: CPT | Mod: 91

## 2019-08-14 PROCEDURE — A9270 NON-COVERED ITEM OR SERVICE: HCPCS | Performed by: INTERNAL MEDICINE

## 2019-08-14 PROCEDURE — 700111 HCHG RX REV CODE 636 W/ 250 OVERRIDE (IP): Performed by: INTERNAL MEDICINE

## 2019-08-14 PROCEDURE — 85018 HEMOGLOBIN: CPT

## 2019-08-14 PROCEDURE — 36415 COLL VENOUS BLD VENIPUNCTURE: CPT

## 2019-08-14 PROCEDURE — 99232 SBSQ HOSP IP/OBS MODERATE 35: CPT | Performed by: HOSPITALIST

## 2019-08-14 PROCEDURE — 700102 HCHG RX REV CODE 250 W/ 637 OVERRIDE(OP): Performed by: INTERNAL MEDICINE

## 2019-08-14 PROCEDURE — 700102 HCHG RX REV CODE 250 W/ 637 OVERRIDE(OP): Performed by: HOSPITALIST

## 2019-08-14 PROCEDURE — 770020 HCHG ROOM/CARE - TELE (206)

## 2019-08-14 PROCEDURE — 85014 HEMATOCRIT: CPT

## 2019-08-14 PROCEDURE — A9270 NON-COVERED ITEM OR SERVICE: HCPCS | Performed by: HOSPITALIST

## 2019-08-14 RX ORDER — TRAMADOL HYDROCHLORIDE 50 MG/1
50 TABLET ORAL EVERY 4 HOURS PRN
Qty: 30 TAB | Refills: 0 | Status: SHIPPED | OUTPATIENT
Start: 2019-08-14 | End: 2019-08-21

## 2019-08-14 RX ORDER — GLIPIZIDE 5 MG/1
5 TABLET ORAL
Status: DISCONTINUED | OUTPATIENT
Start: 2019-08-14 | End: 2019-08-15 | Stop reason: HOSPADM

## 2019-08-14 RX ADMIN — TRAMADOL HYDROCHLORIDE 50 MG: 50 TABLET, FILM COATED ORAL at 09:04

## 2019-08-14 RX ADMIN — SENNOSIDES, DOCUSATE SODIUM 2 TABLET: 50; 8.6 TABLET, FILM COATED ORAL at 18:37

## 2019-08-14 RX ADMIN — ONDANSETRON 4 MG: 4 TABLET, ORALLY DISINTEGRATING ORAL at 09:10

## 2019-08-14 RX ADMIN — ONDANSETRON 4 MG: 2 INJECTION INTRAMUSCULAR; INTRAVENOUS at 02:53

## 2019-08-14 RX ADMIN — SENNOSIDES, DOCUSATE SODIUM 2 TABLET: 50; 8.6 TABLET, FILM COATED ORAL at 04:54

## 2019-08-14 RX ADMIN — GLIPIZIDE 5 MG: 5 TABLET ORAL at 18:37

## 2019-08-14 RX ADMIN — TRAMADOL HYDROCHLORIDE 50 MG: 50 TABLET, FILM COATED ORAL at 23:34

## 2019-08-14 RX ADMIN — TRAMADOL HYDROCHLORIDE 50 MG: 50 TABLET, FILM COATED ORAL at 02:53

## 2019-08-14 RX ADMIN — LEVOTHYROXINE SODIUM 100 MCG: 100 TABLET ORAL at 04:54

## 2019-08-14 RX ADMIN — INSULIN HUMAN 3 UNITS: 100 INJECTION, SOLUTION PARENTERAL at 13:14

## 2019-08-14 RX ADMIN — ONDANSETRON 4 MG: 4 TABLET, ORALLY DISINTEGRATING ORAL at 14:54

## 2019-08-14 RX ADMIN — TRAMADOL HYDROCHLORIDE 50 MG: 50 TABLET, FILM COATED ORAL at 14:53

## 2019-08-14 RX ADMIN — RIVAROXABAN 15 MG: 15 TABLET, FILM COATED ORAL at 18:37

## 2019-08-14 RX ADMIN — ENOXAPARIN SODIUM 80 MG: 100 INJECTION SUBCUTANEOUS at 04:54

## 2019-08-14 RX ADMIN — INSULIN HUMAN 3 UNITS: 100 INJECTION, SOLUTION PARENTERAL at 09:05

## 2019-08-14 RX ADMIN — ACETAMINOPHEN 650 MG: 325 TABLET, FILM COATED ORAL at 13:18

## 2019-08-14 RX ADMIN — ONDANSETRON 4 MG: 2 INJECTION INTRAMUSCULAR; INTRAVENOUS at 23:39

## 2019-08-14 ASSESSMENT — ENCOUNTER SYMPTOMS
DIZZINESS: 0
NERVOUS/ANXIOUS: 1
WHEEZING: 0
FALLS: 0
ABDOMINAL PAIN: 0
HEADACHES: 0
MYALGIAS: 1
SHORTNESS OF BREATH: 0
CHILLS: 0
PALPITATIONS: 0
COUGH: 0
NAUSEA: 0
VOMITING: 0
LOSS OF CONSCIOUSNESS: 0
FEVER: 0

## 2019-08-14 NOTE — PROGRESS NOTES
Hospital Medicine Daily Progress Note    Date of Service  8/14/2019    Chief Complaint  54 y.o. female admitted 8/11/2019 with LLE swelling/pain/inability to walk well since 8/9/19    Hospital Course    Hx reviewed and discussed with patient/family; L leg swelling/discomfort about the same; no sob/chest pain.      Interval Problem Update  Apprehensive to ambulate, concerned about the pain. Has been going to restroom with FWW for additional support. Plan to increase mobilization today and meet with DM education as A1C is elevated  > 9%.    Consultants/Specialty  none    Code Status  full    Disposition  Anticipate home tomorrow morning.     Review of Systems  Review of Systems   Constitutional: Positive for malaise/fatigue. Negative for chills and fever.   HENT: Negative for congestion.    Respiratory: Negative for cough, shortness of breath and wheezing.    Cardiovascular: Positive for leg swelling (improving). Negative for chest pain and palpitations.   Gastrointestinal: Negative for abdominal pain, nausea and vomiting.   Genitourinary: Negative for dysuria.   Musculoskeletal: Positive for myalgias (left leg). Negative for falls and joint pain.   Skin: Negative for itching and rash.   Neurological: Negative for dizziness, loss of consciousness and headaches.   Psychiatric/Behavioral: The patient is nervous/anxious.    All other systems reviewed and are negative.       Physical Exam  Temp:  [36.5 °C (97.7 °F)-37.2 °C (98.9 °F)] 37 °C (98.6 °F)  Pulse:  [81-98] 87  Resp:  [16-18] 16  BP: (104-121)/(66-82) 104/66  SpO2:  [93 %-98 %] 97 %    Physical Exam   Constitutional: She is oriented to person, place, and time. She appears well-developed. No distress.   HENT:   Head: Normocephalic.   Mouth/Throat: Oropharynx is clear and moist.   Eyes: EOM are normal. No scleral icterus.   Neck: Normal range of motion. No JVD present. No tracheal deviation present.   Cardiovascular: Normal rate, regular rhythm and intact distal  pulses.   Pulmonary/Chest: Effort normal and breath sounds normal. No stridor. No respiratory distress. She has no rales.   Abdominal: Soft. She exhibits no distension. There is no tenderness.   Musculoskeletal: She exhibits tenderness (improving). She exhibits no edema (left leg, nearly resolved).   Moderate swelling, tenderness of entire LLE   Neurological: She is alert and oriented to person, place, and time.   Skin: Skin is warm and dry.   Psychiatric: Her speech is normal and behavior is normal.   Mildly anxious   Vitals reviewed.  Patient seen and examined today on 8/14, unchanged from 8/13.     Fluids    Intake/Output Summary (Last 24 hours) at 8/14/2019 0803  Last data filed at 8/13/2019 2000  Gross per 24 hour   Intake 100 ml   Output --   Net 100 ml       Laboratory  Recent Labs     08/12/19  0054 08/13/19 0219 08/14/19 0229   WBC 6.3 5.4  --    RBC 4.56 4.33  --    HEMOGLOBIN 13.2 12.7 13.3   HEMATOCRIT 40.3 38.0 39.3   MCV 88.4 87.8  --    MCH 28.9 29.3  --    MCHC 32.8* 33.4*  --    RDW 43.1 42.1  --    PLATELETCT 128* 131*  --    MPV 9.2 9.1  --      Recent Labs     08/12/19  0054 08/13/19 0219 08/14/19 0229   SODIUM 137 135 136   POTASSIUM 3.9 4.1 3.9   CHLORIDE 102 103 103   CO2 25 26 24   GLUCOSE 251* 239* 180*   BUN 17 16 12   CREATININE 1.09 1.21 0.99   CALCIUM 9.2 9.4 9.0                   Imaging  No orders to display        Assessment/Plan  * DVT (deep venous thrombosis) (HCC)- (present on admission)  Assessment & Plan  Family history of clotting. Son with Factor V Leiden. Significant DVT in her left leg. Hemodynamically stable and saturating well on RA. Hemoglobin stable.  - s/p therapeutic lovenox, now on xarelto (pre auth done and not cost prohibitive)   - continue to ambulate  - Hypercoagulable work-up will need to be continued in the outpatient setting.    Factor V leiden, Protein C and S in process    Diabetes mellitus with hyperglycemia (HCC)- (present on admission)  Assessment &  Plan  A1C elevated at 9.2%. Patient states she just got a new glucometer and has no issues with using it. Apprehensive about starting insulin.  - resume home glipizide  - change to moderate sliding scale  - DM diet and hypoglycemia protocol  - DM education appreciated    Hypothyroidism- (present on admission)  Assessment & Plan  - Continue home Synthroid at 100 mcg daily  - outpatient follow up       VTE prophylaxis: lovenox, will start xarelto.

## 2019-08-14 NOTE — PROGRESS NOTES
2 RN skin check complete.   Devices in place N/A.  Skin assessed under devices N/A.  Confirmed pressure ulcers found on N/A.  New potential pressure ulcers noted on N/A. Wound consult placed N/A.  The following interventions in place patient has pillows in place for comfort measures and offloading. Patient to ambulate today.

## 2019-08-15 ENCOUNTER — PATIENT OUTREACH (OUTPATIENT)
Dept: HEALTH INFORMATION MANAGEMENT | Facility: OTHER | Age: 54
End: 2019-08-15

## 2019-08-15 VITALS
RESPIRATION RATE: 15 BRPM | TEMPERATURE: 98.7 F | SYSTOLIC BLOOD PRESSURE: 111 MMHG | HEIGHT: 66 IN | HEART RATE: 84 BPM | OXYGEN SATURATION: 95 % | BODY MASS INDEX: 29.62 KG/M2 | WEIGHT: 184.3 LBS | DIASTOLIC BLOOD PRESSURE: 69 MMHG

## 2019-08-15 LAB
F5 P.R506Q BLD/T QL: ABNORMAL
GLUCOSE BLD-MCNC: 104 MG/DL (ref 65–99)

## 2019-08-15 PROCEDURE — A9270 NON-COVERED ITEM OR SERVICE: HCPCS | Performed by: HOSPITALIST

## 2019-08-15 PROCEDURE — A9270 NON-COVERED ITEM OR SERVICE: HCPCS | Performed by: INTERNAL MEDICINE

## 2019-08-15 PROCEDURE — 700102 HCHG RX REV CODE 250 W/ 637 OVERRIDE(OP): Performed by: HOSPITALIST

## 2019-08-15 PROCEDURE — 82962 GLUCOSE BLOOD TEST: CPT

## 2019-08-15 PROCEDURE — 99239 HOSP IP/OBS DSCHRG MGMT >30: CPT | Performed by: HOSPITALIST

## 2019-08-15 PROCEDURE — 700111 HCHG RX REV CODE 636 W/ 250 OVERRIDE (IP): Performed by: INTERNAL MEDICINE

## 2019-08-15 PROCEDURE — 700102 HCHG RX REV CODE 250 W/ 637 OVERRIDE(OP): Performed by: INTERNAL MEDICINE

## 2019-08-15 RX ORDER — ONDANSETRON 4 MG/1
4 TABLET, ORALLY DISINTEGRATING ORAL EVERY 4 HOURS PRN
Qty: 10 TAB | Refills: 0 | Status: SHIPPED | OUTPATIENT
Start: 2019-08-15

## 2019-08-15 RX ORDER — AMOXICILLIN 250 MG
2 CAPSULE ORAL
Qty: 30 TAB | Refills: 0 | Status: SHIPPED | OUTPATIENT
Start: 2019-08-15 | End: 2021-06-15

## 2019-08-15 RX ADMIN — SENNOSIDES, DOCUSATE SODIUM 2 TABLET: 50; 8.6 TABLET, FILM COATED ORAL at 07:11

## 2019-08-15 RX ADMIN — RIVAROXABAN 15 MG: 15 TABLET, FILM COATED ORAL at 08:56

## 2019-08-15 RX ADMIN — ONDANSETRON 4 MG: 4 TABLET, ORALLY DISINTEGRATING ORAL at 10:31

## 2019-08-15 RX ADMIN — LEVOTHYROXINE SODIUM 100 MCG: 100 TABLET ORAL at 07:11

## 2019-08-15 RX ADMIN — TRAMADOL HYDROCHLORIDE 50 MG: 50 TABLET, FILM COATED ORAL at 10:27

## 2019-08-15 NOTE — PROGRESS NOTES
Diabetes education: Met with pt this afternoon. Please see consult note.  Plan: CDE to follow up tomorrow for questions and needs. Please call [phone] if needs change.

## 2019-08-15 NOTE — DISCHARGE PLANNING
Received Choice form at 1040  Agency/Facility Name: [facility]  Referral sent per Choice form at 1048

## 2019-08-15 NOTE — DISCHARGE SUMMARY
Discharge Summary    CHIEF COMPLAINT ON ADMISSION  Chief Complaint   Patient presents with   • Leg Pain   • Leg Swelling       Reason for Admission  EMS - Transfer      Admission Date  8/11/2019    CODE STATUS  Full Code    HPI & HOSPITAL COURSE  This is a 54 y.o. female here with left leg swelling. She states that she woke up with it swollen one day and when it continued to worsen she went to urgent care for further evaluation. At that time she was sent to the ED but US was negative so she was sent home. Her leg continued to worsen, prompting her to return to the ED. She was found to have an extensive left LE thrombus so she was transferred here for possible IVF filter. Given her extensive family history of clotting and factor V leiden in her son, she was admitted, started on therapeutic lovenox and monitored on tele.     Labs were unremarkable. Factor 5 leiden and protein C and S were sent but pending at the time of discharge. It was stress the importance of follow up with these with her PCP and possibly continue the hypercoagulable work up. Pre auth was sent for xarelto, which was not cost prohibitive. She tolerated it well and her hemoglobin remained stable. She worked with PT/OT who recommended a FWW given the pain she was having with putting her full weight on the left leg.       Therefore, she is discharged in good and stable condition to home with close outpatient follow-up.    The patient met 2-midnight criteria for an inpatient stay at the time of discharge.    Discharge Date  8/15/2019    FOLLOW UP ITEMS POST DISCHARGE  Please follow up with your PCP.     A1C was noted to be elevated at 9.2%. DM education was provided.    DISCHARGE DIAGNOSES  Principal Problem:    DVT (deep venous thrombosis) (HCC) POA: Yes  Active Problems:    Diabetes mellitus with hyperglycemia (HCC) POA: Yes    Hypothyroidism POA: Yes  Resolved Problems:    Acute kidney failure (HCC) POA: Unknown      FOLLOW UP  Lisa Roberts  P.A.  [address] [place] [address]  [phone]    On 8/19/2019  Please arrive by 2:20PM for your appointment.Thank you      MEDICATIONS ON DISCHARGE     Medication List      START taking these medications      Instructions   ondansetron 4 MG Tbdp  Commonly known as:  ZOFRAN ODT   Take 1 Tab by mouth every four hours as needed for Nausea (give PO if no IV route available).  Dose:  4 mg     * rivaroxaban 15 MG Tabs tablet  Commonly known as:  XARELTO   Take 1 Tab by mouth 2 Times a Day for 21 days.  Dose:  15 mg     * rivaroxaban 20 MG Tabs tablet  Commonly known as:  XARELTO   Doctor's comments:  To start after full course of 15mg BID x21 days  Take 1 Tab by mouth with dinner.  Dose:  20 mg     senna-docusate 8.6-50 MG Tabs  Commonly known as:  PERICOLACE or SENOKOT S   Take 2 Tabs by mouth 1 time daily as needed for Constipation.  Dose:  2 Tab     tramadol 50 MG Tabs  Commonly known as:  ULTRAM   Take 1 Tab by mouth every four hours as needed for Moderate Pain or Severe Pain for up to 7 days.  Dose:  50 mg         * This list has 2 medication(s) that are the same as other medications prescribed for you. Read the directions carefully, and ask your doctor or other care provider to review them with you.            CONTINUE taking these medications      Instructions   glipiZIDE 5 MG Tabs  Commonly known as:  GLUCOTROL   Take 5 mg by mouth every morning.  Dose:  5 mg     levothyroxine 100 MCG Tabs  Commonly known as:  SYNTHROID   Take 100 mcg by mouth Every morning on an empty stomach.  Dose:  100 mcg        STOP taking these medications    ibuprofen 200 MG Tabs  Commonly known as:  MOTRIN            Allergies  Allergies   Allergen Reactions   • Compazine      agitation   • Iodine Hives   • Morphine Vomiting   • Penicillins      Reaction unknown   • Tape Rash     Paper tape OK   • Vicodin [Hydrocodone-Acetaminophen] Itching       DIET  Orders Placed This Encounter   Procedures   • Diet Order Diabetic      Standing Status:   Standing     Number of Occurrences:   1     Order Specific Question:   Diet:     Answer:   Diabetic [3]       ACTIVITY  As tolerated.  Weight bearing as tolerated    CONSULTATIONS  None    PROCEDURES  No orders to display         LABORATORY  Lab Results   Component Value Date    SODIUM 136 08/14/2019    POTASSIUM 3.9 08/14/2019    CHLORIDE 103 08/14/2019    CO2 24 08/14/2019    GLUCOSE 180 (H) 08/14/2019    BUN 12 08/14/2019    CREATININE 0.99 08/14/2019        Lab Results   Component Value Date    WBC 5.4 08/13/2019    HEMOGLOBIN 13.3 08/14/2019    HEMATOCRIT 39.3 08/14/2019    PLATELETCT 131 (L) 08/13/2019        Total time of the discharge process exceeds 34 minutes.

## 2019-08-15 NOTE — DISCHARGE INSTRUCTIONS
"Discharge Instructions      Discharge Instructions per Gisela Alcantara M.D.    You are being started on a blood thinner, xarelto. Your hemoglobin (RBCs) have been stable while on it in the hospital. Please follow up with your PCP and take your medications as directed. Pending studies at the time of discharge: Factor V Leiden and protein C and S were \"in process\" and not yet resulted.    ACTIVITY: as tolerated. Start with the aid of front wheel walker    DIAGNOSIS: left lower extremity DVT    Return to ER if you develop bleeding, SOB, dizziness, new or worsening symptoms.                 Discharged to home by car with relative. Discharged via wheelchair, hospital escort: Yes.  Special equipment needed: Walker    Be sure to schedule a follow-up appointment with your primary care doctor or any specialists as instructed.     Discharge Plan:   Diet Plan: Discussed  Activity Level: Discussed  Confirmed Follow up Appointment: Appointment Scheduled  Confirmed Symptoms Management: Discussed  Medication Reconciliation Updated: Yes  Influenza Vaccine Indication: Indicated: Not available from distributor/    I understand that a diet low in cholesterol, fat, and sodium is recommended for good health. Unless I have been given specific instructions below for another diet, I accept this instruction as my diet prescription.   Other diet: Diabetic    Special Instructions:   Deep Vein Thrombosis Discharge Instructions    A deep vein thrombosis (DVT) is a blood clot (thrombus) that develops in a deep vein. A DVT is a clot in the deep, larger veins of the leg, arm, or pelvis. These are more dangerous than clots that might form in veins on the surface of the body. Deep vein thrombosis can lead to complications if the clot breaks off and travels in the bloodstream to the lungs.     CAUSES  Blood clots form in a vein for different reasons. Usually several things cause blood clots. They include:   · The flow of blood slows " down.   · The inside of the vein is damaged in some way.   · The person has a condition that makes blood clot more easily. These conditions may include:  · Older age (especially over 75 years old).  · Having a history of blood clots.  · Having major or lengthy surgery. Hip surgery is particularly high-risk.   · Breaking a hip or leg.  · Sitting or lying still for a long time.  · Cancer or cancer treatment.  · Having a long, thin tube (catheter) placed inside a vein during a medical procedure.   · Being overweight (obese).  · Pregnancy and childbirth.  · Medicines with estrogen.  · Smoking.  · Other circulation or heart problems.     SYMPTOMS  When a clot forms, it can either partially or totally block the blood flow in that vein. Symptoms of a DVT can include:  · Swelling of the leg or arm, especially if one side is much worse.  · Warmth and redness of the leg or arm, especially if one side is much worse.   · Pain in an arm or leg. If the clot is in the leg, symptoms may be more noticeable or worse when standing or walking.  If the blood clot travels to the lung, it may cause:  · Shortness of breath.  · Chest pain. The pain may be worsened by deep breaths.   · Coughing up thick mucus (phlegm), possibly flecked with blood.   Anyone with these symptoms should get emergency medical treatment right away. Call your local emergency  Services (911 in [place] if you have these symptoms.     DIAGNOSIS  If a DVT is suspected, your caregiver will take a full medical history. He or she will also perform a physical exam. Tests that also may be required include:   · Studies of the clotting properties of the blood.   · An ultrasound scan.   · X-rays to show the flow of blood when special dye is injected into the veins (venography).   · Studies of your lungs if you have any chest symptoms.     PREVENTION  · Exercise the legs regularly. Take a brisk 30 minute walk every day.   · Maintain a weight that is appropriate for your  height.  · Avoid sitting or lying in bed for long periods of time without moving your legs.   · Women, particularly those over the age of 35, should consider the risks and benefits of taking estrogen medicine, including birth control pills.   · Do not smoke, especially if you take estrogen medicines.   · Long-distance travel can increase your risk. You should exercise your legs by walking or pumping the muscles every hour.   · In hospital prevention: Prevention may include medical and non medical measures.     TREATMENT  · The most common treatment for DVT is blood thinning (anticoagulant) medicine, which reduces the blood's tendency to clot. Anticoagulants can stop new blood clots from forming and old ones from growing. They cannot dissolve existing clots. Your body does this by itself over time. Anticoagulants can be given by mouth, by intravenous (IV) access, or by injection. Your caregiver will determine the best program for you.   · Less commonly, clot-dissolving drugs (thrombolytics) are used to dissolve a DVT. They carry a high risk of bleeding, so they are used mainly in severe cases.   · Very rarely, a blood clot in the leg needs to be removed surgically.   · If you are unable to take anticoagulants, your caregiver may arrange for you to have a filter placed in a main vein in your belly (abdomen). This filter prevents clots from traveling to your lungs.     HOME CARE INSTRUCTIONS  Take all medicines prescribed by your caregiver. Follow the directions carefully.   · You will most likely continue taking anticoagulants after you leave the hospital. Your caregiver will advise you on the length of treatment (usually 3 to 5 months, sometimes for life).   · Taking too much or too little of an anticoagulant is dangerous. While taking this type of medicine, you will need to have regular blood tests to be sure the dose is correct. The dose can change for many reasons. It is critically important that you take this  medicine exactly as prescribed, and that you have blood tests exactly as directed.   · Many foods can interfere with anticoagulants. These include foods high in vitamin K, such as spinach, kale, broccoli, cabbage, sd and turnip greens, [place] sprouts, peas, cauliflower, seaweed, parsley, beef and pork liver, green tea, and soybean oil. Your caregiver should discuss limits on these foods with you or you should arrange a visit with a dietician to answer your questions.   · Many medicines can interfere with anticoagulants. You must tell your caregiver about any and all medicines you take. This includes all vitamins and supplements. Be especially cautious with aspirin and anti-inflammatory medicines. Ask your caregiver before taking these.   · Anticoagulants can have side effects, mostly excessive bruising or bleeding. You will need to hold pressure over cuts for longer than usual. Avoid alcoholic drinks or consume only very small amounts while taking this medicine.    If you are taking an anticoagulant:  · Wear a medical alert bracelet.  · Notify your dentist or other caregivers before procedures.  · Avoid contact sports.    · Ask your caregiver how soon you can go back to normal activities. Not being active can lead to new clots. Ask for a list of what you should and should not do.   · Exercise your lower leg muscles. This is important while traveling.   · You may need to wear compression stockings. These are tight elastic stockings that apply pressure to the lower legs. This can help keep the blood in the legs from clotting.   · If you are a smoker, you should quit.   · Learn as much as you can about DVT.     SEEK MEDICAL CARE IF:  · You have unusual bruising or any bleeding problems.  · The swelling or pain in your affected arm or leg is not gradually improving.   · You anticipate surgery or long-distance travel. You should get specific advice on DVT prevention.   · You discover other family members with  blood clots. This may require further testing for inherited diseases or conditions.     SEEK IMMEDIATE MEDICAL CARE IF:  · You develop chest pain.  · You develop severe shortness of breath.  · You begin to cough up bloody mucus or phlegm (sputum).  · You feel dizzy or faint.   · You develop swelling or pain in the leg.  · You have breathing problems after traveling.    MAKE SURE YOU:  · Understand these instructions.  · Will watch your condition.  · Will get help right away if you are not doing well or get worse.       · Is patient discharged on Warfarin / Coumadin?   No     Rivaroxaban oral tablets  What is this medicine?  RIVAROXABAN (ri va MIKEY a ban) is an anticoagulant (blood thinner). It is used to treat blood clots in the lungs or in the veins. It is also used after knee or hip surgeries to prevent blood clots. It is also used to lower the chance of stroke in people with a medical condition called atrial fibrillation.  This medicine may be used for other purposes; ask your health care provider or pharmacist if you have questions.  COMMON BRAND NAME(S): Xarelto, Xarelto Starter Pack  What should I tell my health care provider before I take this medicine?  They need to know if you have any of these conditions:  -bleeding disorders  -bleeding in the brain  -blood in your stools (black or tarry stools) or if you have blood in your vomit  -history of stomach bleeding  -kidney disease  -liver disease  -low blood counts, like low white cell, platelet, or red cell counts  -recent or planned spinal or epidural procedure  -take medicines that treat or prevent blood clots  -an unusual or allergic reaction to rivaroxaban, other medicines, foods, dyes, or preservatives  -pregnant or trying to get pregnant  -breast-feeding  How should I use this medicine?  Take this medicine by mouth with a glass of water. Follow the directions on the prescription label. Take your medicine at regular intervals. Do not take it more often  than directed. Do not stop taking except on your doctor's advice. Stopping this medicine may increase your risk of a blood clot. Be sure to refill your prescription before you run out of medicine.  If you are taking this medicine after hip or knee replacement surgery, take it with or without food. If you are taking this medicine for atrial fibrillation, take it with your evening meal. If you are taking this medicine to treat blood clots, take it with food at the same time each day. If you are unable to swallow your tablet, you may crush the tablet and mix it in applesauce. Then, immediately eat the applesauce. You should eat more food right after you eat the applesauce containing the crushed tablet.  Talk to your pediatrician regarding the use of this medicine in children. Special care may be needed.  Overdosage: If you think you have taken too much of this medicine contact a poison control center or emergency room at once.  NOTE: This medicine is only for you. Do not share this medicine with others.  What if I miss a dose?  If you take your medicine once a day and miss a dose, take the missed dose as soon as you remember. If you take your medicine twice a day and miss a dose, take the missed dose immediately. In this instance, 2 tablets may be taken at the same time. The next day you should take 1 tablet twice a day as directed.  What may interact with this medicine?  Do not take this medicine with any of the following medications:  -defibrotide  This medicine may also interact with the following medications:  -aspirin and aspirin-like medicines  -certain antibiotics like erythromycin, azithromycin, and clarithromycin  -certain medicines for fungal infections like ketoconazole and itraconazole  -certain medicines for irregular heart beat like amiodarone, quinidine, dronedarone  -certain medicines for seizures like carbamazepine, phenytoin  -certain medicines that treat or prevent blood clots like warfarin,  enoxaparin, and dalteparin  -conivaptan  -diltiazem  -felodipine  -indinavir  -lopinavir; ritonavir  -NSAIDS, medicines for pain and inflammation, like ibuprofen or naproxen  -ranolazine  -rifampin  -ritonavir  -SNRIs, medicines for depression, like desvenlafaxine, duloxetine, levomilnacipran, venlafaxine  -SSRIs, medicines for depression, like citalopram, escitalopram, fluoxetine, fluvoxamine, paroxetine, sertraline  -Miles's wort  -verapamil  This list may not describe all possible interactions. Give your health care provider a list of all the medicines, herbs, non-prescription drugs, or dietary supplements you use. Also tell them if you smoke, drink alcohol, or use illegal drugs. Some items may interact with your medicine.  What should I watch for while using this medicine?  Visit your doctor or health care professional for regular checks on your progress.  Notify your doctor or health care professional and seek emergency treatment if you develop breathing problems; changes in vision; chest pain; severe, sudden headache; pain, swelling, warmth in the leg; trouble speaking; sudden numbness or weakness of the face, arm or leg. These can be signs that your condition has gotten worse.  If you are going to have surgery or other procedure, tell your doctor that you are taking this medicine.  What side effects may I notice from receiving this medicine?  Side effects that you should report to your doctor or health care professional as soon as possible:  -allergic reactions like skin rash, itching or hives, swelling of the face, lips, or tongue  -back pain  -redness, blistering, peeling or loosening of the skin, including inside the mouth  -signs and symptoms of bleeding such as bloody or black, tarry stools; red or dark-brown urine; spitting up blood or brown material that looks like coffee grounds; red spots on the skin; unusual bruising or bleeding from the eye, gums, or nose  Side effects that usually do not  require medical attention (report to your doctor or health care professional if they continue or are bothersome):  -dizziness  -muscle pain  This list may not describe all possible side effects. Call your doctor for medical advice about side effects. You may report side effects to FDA at 9-308-FDA-5550.  Where should I keep my medicine?  Keep out of the reach of children.  Store at room temperature between 15 and 30 degrees C (59 and 86 degrees F). Throw away any unused medicine after the expiration date.  NOTE: This sheet is a summary. It may not cover all possible information. If you have questions about this medicine, talk to your doctor, pharmacist, or health care provider.  © 2018 [employer]/Gold Standard (2017-09-06 16:29:33)    Tramadol tablets  What is this medicine?  TRAMADOL (TRA ma dole) is a pain reliever. It is used to treat moderate to severe pain in adults.  This medicine may be used for other purposes; ask your health care provider or pharmacist if you have questions.  COMMON BRAND NAME(S): Ultram  What should I tell my health care provider before I take this medicine?  They need to know if you have any of these conditions:  -brain tumor  -depression  -drug abuse or addiction  -head injury  -if you frequently drink alcohol containing drinks  -kidney disease or trouble passing urine  -liver disease  -lung disease, asthma, or breathing problems  -seizures or epilepsy  -suicidal thoughts, plans, or attempt; a previous suicide attempt by you or a family member  -an unusual or allergic reaction to tramadol, codeine, other medicines, foods, dyes, or preservatives  -pregnant or trying to get pregnant  -breast-feeding  How should I use this medicine?  Take this medicine by mouth with a full glass of water. Follow the directions on the prescription label. You can take it with or without food. If it upsets your stomach, take it with food. Do not take your medicine more often than directed.  A special MedGuide  will be given to you by the pharmacist with each prescription and refill. Be sure to read this information carefully each time.  Talk to your pediatrician regarding the use of this medicine in children. Special care may be needed.  Overdosage: If you think you have taken too much of this medicine contact a poison control center or emergency room at once.  NOTE: This medicine is only for you. Do not share this medicine with others.  What if I miss a dose?  If you miss a dose, take it as soon as you can. If it is almost time for your next dose, take only that dose. Do not take double or extra doses.  What may interact with this medicine?  Do not take this medication with any of the following medicines:  -MAOIs like Carbex, Eldepryl, Marplan, Nardil, and Parnate  This medicine may also interact with the following medications:  -alcohol  -antihistamines for allergy, cough and cold  -certain medicines for anxiety or sleep  -certain medicines for depression like amitriptyline, fluoxetine, sertraline  -certain medicines for migraine headache like almotriptan, eletriptan, frovatriptan, naratriptan, rizatriptan, sumatriptan, zolmitriptan  -certain medicines for seizures like carbamazepine, oxcarbazepine, phenobarbital, primidone  -certain medicines that treat or prevent blood clots like warfarin  -digoxin  -furazolidone  -general anesthetics like halothane, isoflurane, methoxyflurane, propofol  -linezolid  -local anesthetics like lidocaine, pramoxine, tetracaine  -medicines that relax muscles for surgery  -other narcotic medicines for pain or cough  -phenothiazines like chlorpromazine, mesoridazine, prochlorperazine, thioridazine  -procarbazine  This list may not describe all possible interactions. Give your health care provider a list of all the medicines, herbs, non-prescription drugs, or dietary supplements you use. Also tell them if you smoke, drink alcohol, or use illegal drugs. Some items may interact with your  medicine.  What should I watch for while using this medicine?  Tell your doctor or health care professional if your pain does not go away, if it gets worse, or if you have new or a different type of pain. You may develop tolerance to the medicine. Tolerance means that you will need a higher dose of the medicine for pain relief. Tolerance is normal and is expected if you take this medicine for a long time.  Do not suddenly stop taking your medicine because you may develop a severe reaction. Your body becomes used to the medicine. This does NOT mean you are addicted. Addiction is a behavior related to getting and using a drug for a non-medical reason. If you have pain, you have a medical reason to take pain medicine. Your doctor will tell you how much medicine to take. If your doctor wants you to stop the medicine, the dose will be slowly lowered over time to avoid any side effects.  There are different types of narcotic medicines (opiates). If you take more than one type at the same time or if you are taking another medicine that also causes drowsiness, you may have more side effects. Give your health care provider a list of all medicines you use. Your doctor will tell you how much medicine to take. Do not take more medicine than directed. Call emergency for help if you have problems breathing or unusual sleepiness.  You may get drowsy or dizzy. Do not drive, use machinery, or do anything that needs mental alertness until you know how this medicine affects you. Do not stand or sit up quickly, especially if you are an older patient. This reduces the risk of dizzy or fainting spells. Alcohol can increase or decrease the effects of this medicine. Avoid alcoholic drinks.  You may have constipation. Try to have a bowel movement at least every 2 to 3 days. If you do not have a bowel movement for 3 days, call your doctor or health care professional.  Your mouth may get dry. Chewing sugarless gum or sucking hard candy, and  drinking plenty of water may help. Contact your doctor if the problem does not go away or is severe.  What side effects may I notice from receiving this medicine?  Side effects that you should report to your doctor or health care professional as soon as possible:  -allergic reactions like skin rash, itching or hives, swelling of the face, lips, or tongue  -breathing problems  -confusion  -seizures  -signs and symptoms of low blood pressure like dizziness; feeling faint or lightheaded, falls; unusually weak or tired  -trouble passing urine or change in the amount of urine  Side effects that usually do not require medical attention (report to your doctor or health care professional if they continue or are bothersome):  -constipation  -dry mouth  -nausea, vomiting  -tiredness  This list may not describe all possible side effects. Call your doctor for medical advice about side effects. You may report side effects to FDA at 4-185-FDA-1182.  Where should I keep my medicine?  Keep out of the reach of children.  This medicine may cause accidental overdose and death if it taken by other adults, children, or pets. Mix any unused medicine with a substance like cat litter or coffee grounds. Then throw the medicine away in a sealed container like a sealed bag or a coffee can with a lid. Do not use the medicine after the expiration date.  Store at room temperature between 15 and 30 degrees C (59 and 86 degrees F).  NOTE: This sheet is a summary. It may not cover all possible information. If you have questions about this medicine, talk to your doctor, pharmacist, or health care provider.  © 2018 [employer]/Gold Standard (2016-09-11 09:00:04)      Depression / Suicide Risk    As you are discharged from this [facility] facility, it is important to learn how to keep safe from harming yourself.    Recognize the warning signs:  · Abrupt changes in personality, positive or negative- including increase in energy   · Giving away  possessions  · Change in eating patterns- significant weight changes-  positive or negative  · Change in sleeping patterns- unable to sleep or sleeping all the time   · Unwillingness or inability to communicate  · Depression  · Unusual sadness, discouragement and loneliness  · Talk of wanting to die  · Neglect of personal appearance   · Rebelliousness- reckless behavior  · Withdrawal from people/activities they love  · Confusion- inability to concentrate     If you or a loved one observes any of these behaviors or has concerns about self-harm, here's what you can do:  · Talk about it- your feelings and reasons for harming yourself  · Remove any means that you might use to hurt yourself (examples: pills, rope, extension cords, firearm)  · Get professional help from the community (Mental Health, Substance Abuse, psychological counseling)  · Do not be alone:Call your Safe Contact- someone whom you trust who will be there for you.  · Call your local CRISIS HOTLINE 096-2805 or 554-594-4141  · Call your local Children's Mobile Crisis Response Team [place] (560) 924-3286 or www.[facility]NaturalHe[facility]  · Call the toll free National Suicide Prevention Hotlines   · National Suicide Prevention Lifeline 719-599-DXXV (7811)  · National Hope Line Network 800-SUICIDE (363-7277)

## 2019-08-15 NOTE — CONSULTS
Diabetes education: Met with pt who has a hx of diabetes, previously on Glipizide at home. Pt states she was on diabetes medications, lost weight and blood sugars were good, until she lost her father and then stopped managing her diabetes, gained weight back. Glipizide was just recently resumed. Pt states they tried Metformin but she developed an allergy to Metformin and that was stopped.  Pt was was admitted with blood sugar of 251 and Hg a1c of 9.2%. Pt is currently on regular insulin sliding scale coverage with blood sugars of 181( 3 units), 144 ( 3 units) and 145 ( 3 units ) with an aggressive scale and then scale lowered with blood sugars of 137 and 155 ( 2 units). Glipizide was resumed with pm meal tonight.  Insulin was not taught and will be should pt need insulin at discharge. Pt had not been testing as there was a problem determining what meter and strips anthem would cover. Pt finally got an accucheck guide meter and strips but had not yet used it. CDE reviewed it 's use as well as the fastclix lancet verbally.  Pt was given and reviewed [facility] DM book.  Discussed what diabetes was, goals for blood sugars, what effects blood sugars, exercise, hyperglycemia , hypoglycemia, glipizide, januvia and actos.  Plan: CDE to follow up tomorrow for questions and needs. Please call [phone] if needs change.

## 2019-08-15 NOTE — PROGRESS NOTES
Patient being discharged. Patient educated on discharge instructions and new prescriptions. Patient verbalized understanding. Follow up appt made with PCP. PIV removed, telemetry monitor checked in. Patient going home with family. RN to call transport to escort out. Will monitor until patient is off unit.

## 2019-09-18 ENCOUNTER — HOSPITAL ENCOUNTER (OUTPATIENT)
Dept: RADIOLOGY | Facility: MEDICAL CENTER | Age: 54
End: 2019-09-18
Attending: INTERNAL MEDICINE
Payer: COMMERCIAL

## 2019-09-18 DIAGNOSIS — C82.03 FOLLICULAR LYMPHOMA GRADE I OF INTRA-ABDOMINAL LYMPH NODES (HCC): ICD-10-CM

## 2019-09-18 PROCEDURE — A9552 F18 FDG: HCPCS

## 2019-10-07 RX ORDER — SODIUM CHLORIDE 9 MG/ML
INJECTION, SOLUTION INTRAVENOUS CONTINUOUS
Status: CANCELLED | OUTPATIENT
Start: 2019-10-14

## 2019-10-14 ENCOUNTER — APPOINTMENT (OUTPATIENT)
Dept: RADIOLOGY | Facility: MEDICAL CENTER | Age: 54
End: 2019-10-14
Attending: INTERNAL MEDICINE
Payer: COMMERCIAL

## 2019-10-14 ENCOUNTER — HOSPITAL ENCOUNTER (OUTPATIENT)
Facility: MEDICAL CENTER | Age: 54
End: 2019-10-14
Attending: INTERNAL MEDICINE | Admitting: INTERNAL MEDICINE
Payer: COMMERCIAL

## 2019-10-14 VITALS
BODY MASS INDEX: 28.73 KG/M2 | SYSTOLIC BLOOD PRESSURE: 114 MMHG | DIASTOLIC BLOOD PRESSURE: 72 MMHG | HEIGHT: 66 IN | WEIGHT: 178.79 LBS | OXYGEN SATURATION: 96 % | RESPIRATION RATE: 16 BRPM | TEMPERATURE: 99 F | HEART RATE: 78 BPM

## 2019-10-14 DIAGNOSIS — C82.03 FOLLICULAR LYMPHOMA GRADE I OF INTRA-ABDOMINAL LYMPH NODES (HCC): ICD-10-CM

## 2019-10-14 DIAGNOSIS — I82.402 DEEP VEIN THROMBOSIS (DVT) OF LEFT LOWER EXTREMITY, UNSPECIFIED CHRONICITY, UNSPECIFIED VEIN (HCC): ICD-10-CM

## 2019-10-14 LAB
ERYTHROCYTE [DISTWIDTH] IN BLOOD BY AUTOMATED COUNT: 41.8 FL (ref 35.9–50)
HCT VFR BLD AUTO: 43 % (ref 37–47)
HGB BLD-MCNC: 14.1 G/DL (ref 12–16)
INR PPP: 0.98 (ref 0.87–1.13)
MCH RBC QN AUTO: 28.4 PG (ref 27–33)
MCHC RBC AUTO-ENTMCNC: 32.8 G/DL (ref 33.6–35)
MCV RBC AUTO: 86.7 FL (ref 81.4–97.8)
PATHOLOGY CONSULT NOTE: NORMAL
PLATELET # BLD AUTO: 120 K/UL (ref 164–446)
PMV BLD AUTO: 9 FL (ref 9–12.9)
PROTHROMBIN TIME: 13.1 SEC (ref 12–14.6)
RBC # BLD AUTO: 4.96 M/UL (ref 4.2–5.4)
WBC # BLD AUTO: 5.3 K/UL (ref 4.8–10.8)

## 2019-10-14 PROCEDURE — 38505 NEEDLE BIOPSY LYMPH NODES: CPT | Mod: LT

## 2019-10-14 PROCEDURE — 700111 HCHG RX REV CODE 636 W/ 250 OVERRIDE (IP)

## 2019-10-14 PROCEDURE — 700111 HCHG RX REV CODE 636 W/ 250 OVERRIDE (IP): Performed by: RADIOLOGY

## 2019-10-14 PROCEDURE — 88305 TISSUE EXAM BY PATHOLOGIST: CPT

## 2019-10-14 PROCEDURE — 160002 HCHG RECOVERY MINUTES (STAT)

## 2019-10-14 PROCEDURE — 85027 COMPLETE CBC AUTOMATED: CPT

## 2019-10-14 PROCEDURE — 700105 HCHG RX REV CODE 258: Performed by: NURSE PRACTITIONER

## 2019-10-14 PROCEDURE — 85610 PROTHROMBIN TIME: CPT

## 2019-10-14 RX ORDER — MIDAZOLAM HYDROCHLORIDE 1 MG/ML
INJECTION INTRAMUSCULAR; INTRAVENOUS
Status: COMPLETED
Start: 2019-10-14 | End: 2019-10-14

## 2019-10-14 RX ORDER — OXYCODONE HYDROCHLORIDE 10 MG/1
10 TABLET ORAL
Status: DISCONTINUED | OUTPATIENT
Start: 2019-10-14 | End: 2019-10-14 | Stop reason: HOSPADM

## 2019-10-14 RX ORDER — OXYCODONE HYDROCHLORIDE 5 MG/1
5 TABLET ORAL
Status: DISCONTINUED | OUTPATIENT
Start: 2019-10-14 | End: 2019-10-14 | Stop reason: HOSPADM

## 2019-10-14 RX ORDER — SODIUM CHLORIDE 9 MG/ML
500 INJECTION, SOLUTION INTRAVENOUS
Status: DISCONTINUED | OUTPATIENT
Start: 2019-10-14 | End: 2019-10-14 | Stop reason: HOSPADM

## 2019-10-14 RX ORDER — MIDAZOLAM HYDROCHLORIDE 1 MG/ML
.5-2 INJECTION INTRAMUSCULAR; INTRAVENOUS PRN
Status: DISCONTINUED | OUTPATIENT
Start: 2019-10-14 | End: 2019-10-14 | Stop reason: HOSPADM

## 2019-10-14 RX ORDER — LIDOCAINE HYDROCHLORIDE 10 MG/ML
INJECTION, SOLUTION EPIDURAL; INFILTRATION; INTRACAUDAL; PERINEURAL
Status: COMPLETED
Start: 2019-10-14 | End: 2019-10-14

## 2019-10-14 RX ORDER — SODIUM CHLORIDE 9 MG/ML
INJECTION, SOLUTION INTRAVENOUS CONTINUOUS
Status: DISCONTINUED | OUTPATIENT
Start: 2019-10-14 | End: 2019-10-14 | Stop reason: HOSPADM

## 2019-10-14 RX ORDER — HYDROMORPHONE HYDROCHLORIDE 2 MG/ML
0.5 INJECTION, SOLUTION INTRAMUSCULAR; INTRAVENOUS; SUBCUTANEOUS
Status: DISCONTINUED | OUTPATIENT
Start: 2019-10-14 | End: 2019-10-14 | Stop reason: HOSPADM

## 2019-10-14 RX ORDER — ONDANSETRON 2 MG/ML
4 INJECTION INTRAMUSCULAR; INTRAVENOUS PRN
Status: DISCONTINUED | OUTPATIENT
Start: 2019-10-14 | End: 2019-10-14 | Stop reason: HOSPADM

## 2019-10-14 RX ADMIN — MIDAZOLAM 2 MG: 1 INJECTION INTRAMUSCULAR; INTRAVENOUS at 13:55

## 2019-10-14 RX ADMIN — MIDAZOLAM HYDROCHLORIDE 2 MG: 1 INJECTION, SOLUTION INTRAMUSCULAR; INTRAVENOUS at 13:55

## 2019-10-14 RX ADMIN — FENTANYL CITRATE 50 MCG: 50 INJECTION INTRAMUSCULAR; INTRAVENOUS at 13:54

## 2019-10-14 RX ADMIN — MIDAZOLAM 2 MG: 1 INJECTION INTRAMUSCULAR; INTRAVENOUS at 14:01

## 2019-10-14 RX ADMIN — LIDOCAINE HYDROCHLORIDE 5 ML: 10 INJECTION, SOLUTION EPIDURAL; INFILTRATION; INTRACAUDAL at 14:04

## 2019-10-14 RX ADMIN — SODIUM CHLORIDE: 9 INJECTION, SOLUTION INTRAVENOUS at 11:46

## 2019-10-14 NOTE — DISCHARGE INSTRUCTIONS
ACTIVITY: Rest and take it easy for the first 24 hours.  A responsible adult is recommended to remain with you during that time.  It is normal to feel sleepy.  We encourage you to not do anything that requires balance, judgment or coordination.    MILD FLU-LIKE SYMPTOMS ARE NORMAL. YOU MAY EXPERIENCE GENERALIZED MUSCLE ACHES, THROAT IRRITATION, HEADACHE AND/OR SOME NAUSEA.    FOR 24 HOURS DO NOT:  Drive, operate machinery or run household appliances.  Drink beer or alcoholic beverages.   Make important decisions or sign legal documents.    SPECIAL INSTRUCTIONS: **KEEP INCISION DRY FOR 24 HRS*    DIET: To avoid nausea, slowly advance diet as tolerated, avoiding spicy or greasy foods for the first day.  Add more substantial food to your diet according to your physician's instructions.  Babies can be fed formula or breast milk as soon as they are hungry.  INCREASE FLUIDS AND FIBER TO AVOID CONSTIPATION.    SURGICAL DRESSING/BATHING: *MAY SHOWER TOMORROW AFTERNOON THEN MAY REMOVE DRESSING**    FOLLOW-UP APPOINTMENT:  A follow-up appointment should be arranged with your doctor in **DR ROSS    [phone]*; call to schedule.    You should CALL YOUR PHYSICIAN if you develop:  Fever greater than 101 degrees F.  Pain not relieved by medication, or persistent nausea or vomiting.  Excessive bleeding (blood soaking through dressing) or unexpected drainage from the wound.  Extreme redness or swelling around the incision site, drainage of pus or foul smelling drainage.  Inability to urinate or empty your bladder within 8 hours.  Problems with breathing or chest pain.    You should call 911 if you develop problems with breathing or chest pain.  If you are unable to contact your doctor or surgical center, you should go to the nearest emergency room or urgent care center.  Physician's telephone #: *[phone]**    If any questions arise, call your doctor.  If your doctor is not available, please feel free to call the Surgical Center  at [phone].  The Center is open Monday through Friday from 7AM to 7PM.  You can also call the HEALTH HOTLINE open 24 hours/day, 7 days/week and speak to a nurse at [phone], or toll free at [phone].    A registered nurse may call you a few days after your surgery to see how you are doing after your procedure.    MEDICATIONS: Resume taking daily medication.  Take prescribed pain medication with food.  If no medication is prescribed, you may take non-aspirin pain medication if needed.  PAIN MEDICATION CAN BE VERY CONSTIPATING.  Take a stool softener or laxative such as senokot, pericolace, or milk of magnesia if needed.      If your physician has prescribed pain medication that includes Acetaminophen (Tylenol), do not take additional Acetaminophen (Tylenol) while taking the prescribed medication.    Depression / Suicide Risk    As you are discharged from this [facility] facility, it is important to learn how to keep safe from harming yourself.    Recognize the warning signs:  · Abrupt changes in personality, positive or negative- including increase in energy   · Giving away possessions  · Change in eating patterns- significant weight changes-  positive or negative  · Change in sleeping patterns- unable to sleep or sleeping all the time   · Unwillingness or inability to communicate  · Depression  · Unusual sadness, discouragement and loneliness  · Talk of wanting to die  · Neglect of personal appearance   · Rebelliousness- reckless behavior  · Withdrawal from people/activities they love  · Confusion- inability to concentrate     If you or a loved one observes any of these behaviors or has concerns about self-harm, here's what you can do:  · Talk about it- your feelings and reasons for harming yourself  · Remove any means that you might use to hurt yourself (examples: pills, rope, extension cords, firearm)  · Get professional help from the community (Mental Health, Substance Abuse, psychological  counseling)  · Do not be alone:Call your Safe Contact- someone whom you trust who will be there for you.  · Call your local CRISIS HOTLINE 165-4230 or 842-020-8796  · Call your local Children's Mobile Crisis Response Team [place] (432) 285-7392 or www.[employer]  · Call the toll free National Suicide Prevention Hotlines   · National Suicide Prevention Lifeline 376-539-NQPB (0574)  · National Hope Line Network 800-SUICIDE (788-4622)

## 2019-10-14 NOTE — PROGRESS NOTES
IR CT RN note:    Site  Confirmed with MD, patient and RN pre procedure   Left chest/axilarry lymph node BX by MD Mitchell assisted by CT Tech Mt,Left chest access site;  x1 core in RPMI, x2 core in formalin sample sent to lab       End Tidal CO2 range 35-37 during procedure   Patient tolerated procedure, hemodynamically stable; pt awake and talking post procedure; report given to PPU CORINE Clayton;   patient transported back to PPU via IR RN monitored then transferred care to report RN

## 2019-10-14 NOTE — OR NURSING
1419    PT RECEIVED FROM IR,  S/P LEFT UPPER CHEST LYMPH NODE BX.  BX SITE WITH SMALL 2X2 AND TEGADERM INTACT.  IS @ BEDSIDE.  PT DENIES ANY PAIN.    1430   BX SITE IS CLEAR.  PT TAKING PO FLUID AND SNACK OK.    1500  BX SITE IS CLEAR.  PT RESTING COMFORTABLY.    1600   DC INSTRUCTIONS GIVEN TO PT AND .  VERBALIZED UNDERSTANDING OF ALL.  HL DC.  PT DC TO HOME,  AMBULATORY,  ESCORTED OUT BY VOLUNTEER.

## 2019-10-14 NOTE — OR NURSING
1419    PT RECEIVED FROM IR,  S/P LEFT UPPER CHEST LYMPH NODE BX.  BX SITE WITH SMALL 2X2 AND TEGADERM INTACT.  IS @ BEDSIDE.  PT DENIES ANY PAIN.    1430   BX SITE IS CLEAR.  PT TAKING PO FLUID AND SNACK OK.    1500  BX SITE IS CLEAR.  PT RESTING COMFORTABLY.    1545   DC INSTRUCTIONS GIVEN TO PT AND .  VERBALIZED UNDERSTANDING OF ALL.  HL DC.  PT DC TO HOME,  AMBULATORY,  ESCORTED OUT BY VOLUNTEER.

## 2019-10-24 ENCOUNTER — APPOINTMENT (OUTPATIENT)
Dept: RADIOLOGY | Facility: MEDICAL CENTER | Age: 54
End: 2019-10-24
Attending: SURGERY
Payer: COMMERCIAL

## 2019-10-24 ENCOUNTER — ANESTHESIA (OUTPATIENT)
Dept: SURGERY | Facility: MEDICAL CENTER | Age: 54
End: 2019-10-24
Payer: COMMERCIAL

## 2019-10-24 ENCOUNTER — HOSPITAL ENCOUNTER (OUTPATIENT)
Facility: MEDICAL CENTER | Age: 54
End: 2019-10-24
Attending: SURGERY | Admitting: SURGERY
Payer: COMMERCIAL

## 2019-10-24 ENCOUNTER — ANESTHESIA EVENT (OUTPATIENT)
Dept: SURGERY | Facility: MEDICAL CENTER | Age: 54
End: 2019-10-24
Payer: COMMERCIAL

## 2019-10-24 VITALS
DIASTOLIC BLOOD PRESSURE: 69 MMHG | SYSTOLIC BLOOD PRESSURE: 125 MMHG | RESPIRATION RATE: 16 BRPM | HEART RATE: 83 BPM | BODY MASS INDEX: 28.7 KG/M2 | TEMPERATURE: 97.9 F | OXYGEN SATURATION: 94 % | HEIGHT: 66 IN | WEIGHT: 178.57 LBS

## 2019-10-24 DIAGNOSIS — G89.18 POST-OPERATIVE PAIN: ICD-10-CM

## 2019-10-24 LAB
BUN BLD-MCNC: 15 MG/DL (ref 8–22)
CA-I BLD ISE-SCNC: 1.2 MMOL/L (ref 1.1–1.3)
CHLORIDE BLD-SCNC: 103 MMOL/L (ref 96–112)
CO2 BLD-SCNC: 27 MMOL/L (ref 20–33)
CREAT BLD-MCNC: 1 MG/DL (ref 0.5–1.4)
GLUCOSE BLD-MCNC: 140 MG/DL (ref 65–99)
GLUCOSE BLD-MCNC: 142 MG/DL (ref 65–99)
HCT VFR BLD CALC: 46 % (ref 37–47)
HGB BLD-MCNC: 15.6 G/DL (ref 12–16)
POTASSIUM BLD-SCNC: 4.3 MMOL/L (ref 3.6–5.5)
SODIUM BLD-SCNC: 140 MMOL/L (ref 135–145)

## 2019-10-24 PROCEDURE — 160048 HCHG OR STATISTICAL LEVEL 1-5: Performed by: SURGERY

## 2019-10-24 PROCEDURE — 160036 HCHG PACU - EA ADDL 30 MINS PHASE I: Performed by: SURGERY

## 2019-10-24 PROCEDURE — 160039 HCHG SURGERY MINUTES - EA ADDL 1 MIN LEVEL 3: Performed by: SURGERY

## 2019-10-24 PROCEDURE — 700105 HCHG RX REV CODE 258: Performed by: SURGERY

## 2019-10-24 PROCEDURE — C1788 PORT, INDWELLING, IMP: HCPCS | Performed by: SURGERY

## 2019-10-24 PROCEDURE — 71045 X-RAY EXAM CHEST 1 VIEW: CPT

## 2019-10-24 PROCEDURE — 700111 HCHG RX REV CODE 636 W/ 250 OVERRIDE (IP): Performed by: ANESTHESIOLOGY

## 2019-10-24 PROCEDURE — 80047 BASIC METABLC PNL IONIZED CA: CPT

## 2019-10-24 PROCEDURE — 160009 HCHG ANES TIME/MIN: Performed by: SURGERY

## 2019-10-24 PROCEDURE — 160025 RECOVERY II MINUTES (STATS): Performed by: SURGERY

## 2019-10-24 PROCEDURE — 85014 HEMATOCRIT: CPT

## 2019-10-24 PROCEDURE — 82962 GLUCOSE BLOOD TEST: CPT

## 2019-10-24 PROCEDURE — 700101 HCHG RX REV CODE 250: Performed by: SURGERY

## 2019-10-24 PROCEDURE — 700111 HCHG RX REV CODE 636 W/ 250 OVERRIDE (IP): Performed by: SURGERY

## 2019-10-24 PROCEDURE — 501838 HCHG SUTURE GENERAL: Performed by: SURGERY

## 2019-10-24 PROCEDURE — 160002 HCHG RECOVERY MINUTES (STAT): Performed by: SURGERY

## 2019-10-24 PROCEDURE — 160028 HCHG SURGERY MINUTES - 1ST 30 MINS LEVEL 3: Performed by: SURGERY

## 2019-10-24 PROCEDURE — 160046 HCHG PACU - 1ST 60 MINS PHASE II: Performed by: SURGERY

## 2019-10-24 PROCEDURE — 160035 HCHG PACU - 1ST 60 MINS PHASE I: Performed by: SURGERY

## 2019-10-24 DEVICE — POWER PORTMRI COMPATABLE: Type: IMPLANTABLE DEVICE | Status: FUNCTIONAL

## 2019-10-24 RX ORDER — ONDANSETRON 2 MG/ML
INJECTION INTRAMUSCULAR; INTRAVENOUS PRN
Status: DISCONTINUED | OUTPATIENT
Start: 2019-10-24 | End: 2019-10-24 | Stop reason: SURG

## 2019-10-24 RX ORDER — OXYCODONE HCL 5 MG/5 ML
10 SOLUTION, ORAL ORAL
Status: DISCONTINUED | OUTPATIENT
Start: 2019-10-24 | End: 2019-10-24 | Stop reason: HOSPADM

## 2019-10-24 RX ORDER — OXYCODONE HCL 5 MG/5 ML
5 SOLUTION, ORAL ORAL
Status: DISCONTINUED | OUTPATIENT
Start: 2019-10-24 | End: 2019-10-24 | Stop reason: HOSPADM

## 2019-10-24 RX ORDER — SODIUM CHLORIDE, SODIUM LACTATE, POTASSIUM CHLORIDE, CALCIUM CHLORIDE 600; 310; 30; 20 MG/100ML; MG/100ML; MG/100ML; MG/100ML
INJECTION, SOLUTION INTRAVENOUS CONTINUOUS
Status: DISCONTINUED | OUTPATIENT
Start: 2019-10-24 | End: 2019-10-24

## 2019-10-24 RX ORDER — SODIUM CHLORIDE, SODIUM LACTATE, POTASSIUM CHLORIDE, CALCIUM CHLORIDE 600; 310; 30; 20 MG/100ML; MG/100ML; MG/100ML; MG/100ML
INJECTION, SOLUTION INTRAVENOUS CONTINUOUS
Status: DISCONTINUED | OUTPATIENT
Start: 2019-10-24 | End: 2019-10-24 | Stop reason: HOSPADM

## 2019-10-24 RX ORDER — DEXAMETHASONE SODIUM PHOSPHATE 4 MG/ML
INJECTION, SOLUTION INTRA-ARTICULAR; INTRALESIONAL; INTRAMUSCULAR; INTRAVENOUS; SOFT TISSUE PRN
Status: DISCONTINUED | OUTPATIENT
Start: 2019-10-24 | End: 2019-10-24 | Stop reason: SURG

## 2019-10-24 RX ORDER — CEFAZOLIN SODIUM 1 G/3ML
INJECTION, POWDER, FOR SOLUTION INTRAMUSCULAR; INTRAVENOUS PRN
Status: DISCONTINUED | OUTPATIENT
Start: 2019-10-24 | End: 2019-10-24 | Stop reason: SURG

## 2019-10-24 RX ORDER — OXYCODONE HYDROCHLORIDE AND ACETAMINOPHEN 5; 325 MG/1; MG/1
1-2 TABLET ORAL EVERY 4 HOURS PRN
Qty: 10 TAB | Refills: 0 | Status: SHIPPED | OUTPATIENT
Start: 2019-10-24 | End: 2019-10-27

## 2019-10-24 RX ORDER — BUPIVACAINE HYDROCHLORIDE AND EPINEPHRINE 5; 5 MG/ML; UG/ML
INJECTION, SOLUTION EPIDURAL; INTRACAUDAL; PERINEURAL
Status: DISCONTINUED
Start: 2019-10-24 | End: 2019-10-24 | Stop reason: HOSPADM

## 2019-10-24 RX ORDER — BUPIVACAINE HYDROCHLORIDE AND EPINEPHRINE 5; 5 MG/ML; UG/ML
INJECTION, SOLUTION EPIDURAL; INTRACAUDAL; PERINEURAL
Status: DISCONTINUED | OUTPATIENT
Start: 2019-10-24 | End: 2019-10-24 | Stop reason: HOSPADM

## 2019-10-24 RX ORDER — METOCLOPRAMIDE HYDROCHLORIDE 5 MG/ML
INJECTION INTRAMUSCULAR; INTRAVENOUS PRN
Status: DISCONTINUED | OUTPATIENT
Start: 2019-10-24 | End: 2019-10-24 | Stop reason: SURG

## 2019-10-24 RX ORDER — HYDROMORPHONE HYDROCHLORIDE 1 MG/ML
0.1 INJECTION, SOLUTION INTRAMUSCULAR; INTRAVENOUS; SUBCUTANEOUS
Status: DISCONTINUED | OUTPATIENT
Start: 2019-10-24 | End: 2019-10-24 | Stop reason: HOSPADM

## 2019-10-24 RX ORDER — ONDANSETRON 2 MG/ML
4 INJECTION INTRAMUSCULAR; INTRAVENOUS
Status: DISCONTINUED | OUTPATIENT
Start: 2019-10-24 | End: 2019-10-24 | Stop reason: HOSPADM

## 2019-10-24 RX ORDER — PANTOPRAZOLE SODIUM 40 MG/1
40 TABLET, DELAYED RELEASE ORAL EVERY MORNING
Status: ON HOLD | COMMUNITY
End: 2021-06-16

## 2019-10-24 RX ORDER — HALOPERIDOL 5 MG/ML
1 INJECTION INTRAMUSCULAR
Status: DISCONTINUED | OUTPATIENT
Start: 2019-10-24 | End: 2019-10-24 | Stop reason: HOSPADM

## 2019-10-24 RX ORDER — KETOROLAC TROMETHAMINE 30 MG/ML
INJECTION, SOLUTION INTRAMUSCULAR; INTRAVENOUS PRN
Status: DISCONTINUED | OUTPATIENT
Start: 2019-10-24 | End: 2019-10-24 | Stop reason: SURG

## 2019-10-24 RX ORDER — MEPERIDINE HYDROCHLORIDE 25 MG/ML
25 INJECTION INTRAMUSCULAR; INTRAVENOUS; SUBCUTANEOUS
Status: DISCONTINUED | OUTPATIENT
Start: 2019-10-24 | End: 2019-10-24 | Stop reason: HOSPADM

## 2019-10-24 RX ORDER — MIDAZOLAM HYDROCHLORIDE 1 MG/ML
0.5 INJECTION INTRAMUSCULAR; INTRAVENOUS
Status: DISCONTINUED | OUTPATIENT
Start: 2019-10-24 | End: 2019-10-24 | Stop reason: HOSPADM

## 2019-10-24 RX ORDER — HYDROMORPHONE HYDROCHLORIDE 1 MG/ML
0.4 INJECTION, SOLUTION INTRAMUSCULAR; INTRAVENOUS; SUBCUTANEOUS
Status: DISCONTINUED | OUTPATIENT
Start: 2019-10-24 | End: 2019-10-24 | Stop reason: HOSPADM

## 2019-10-24 RX ORDER — HEPARIN SODIUM 5000 [USP'U]/ML
INJECTION, SOLUTION INTRAVENOUS; SUBCUTANEOUS
Status: DISCONTINUED
Start: 2019-10-24 | End: 2019-10-24 | Stop reason: HOSPADM

## 2019-10-24 RX ORDER — HYDROMORPHONE HYDROCHLORIDE 1 MG/ML
0.2 INJECTION, SOLUTION INTRAMUSCULAR; INTRAVENOUS; SUBCUTANEOUS
Status: DISCONTINUED | OUTPATIENT
Start: 2019-10-24 | End: 2019-10-24 | Stop reason: HOSPADM

## 2019-10-24 RX ADMIN — ONDANSETRON 4 MG: 2 INJECTION INTRAMUSCULAR; INTRAVENOUS at 15:50

## 2019-10-24 RX ADMIN — LIDOCAINE HYDROCHLORIDE 50 MG: 20 INJECTION, SOLUTION INFILTRATION; PERINEURAL at 15:44

## 2019-10-24 RX ADMIN — PROPOFOL 160 MG: 10 INJECTION, EMULSION INTRAVENOUS at 15:44

## 2019-10-24 RX ADMIN — FENTANYL CITRATE 100 MCG: 50 INJECTION, SOLUTION INTRAMUSCULAR; INTRAVENOUS at 15:42

## 2019-10-24 RX ADMIN — METOCLOPRAMIDE 10 MG: 5 INJECTION, SOLUTION INTRAMUSCULAR; INTRAVENOUS at 15:50

## 2019-10-24 RX ADMIN — CEFAZOLIN 2 G: 330 INJECTION, POWDER, FOR SOLUTION INTRAMUSCULAR; INTRAVENOUS at 15:50

## 2019-10-24 RX ADMIN — KETOROLAC TROMETHAMINE 30 MG: 30 INJECTION, SOLUTION INTRAMUSCULAR at 15:50

## 2019-10-24 RX ADMIN — SODIUM CHLORIDE, POTASSIUM CHLORIDE, SODIUM LACTATE AND CALCIUM CHLORIDE: 600; 310; 30; 20 INJECTION, SOLUTION INTRAVENOUS at 15:41

## 2019-10-24 RX ADMIN — DEXAMETHASONE SODIUM PHOSPHATE 8 MG: 4 INJECTION, SOLUTION INTRA-ARTICULAR; INTRALESIONAL; INTRAMUSCULAR; INTRAVENOUS; SOFT TISSUE at 15:50

## 2019-10-24 ASSESSMENT — PAIN SCALES - GENERAL: PAIN_LEVEL: 0

## 2019-10-24 NOTE — OP REPORT
DATE OF SERVICE:  10/24/2019    SURGEON:  Jose Brewster MD    PREOPERATIVE DIAGNOSIS:  Recurrent lymphoma.    POSTOPERATIVE DIAGNOSIS:  Recurrent lymphoma.    PROCEDURE PERFORMED:  Placement of a subcutaneous venous access device with a   reservoir.    ANESTHESIA:  General anesthesia.    ANESTHESIOLOGIST:  Juan J Ruggiero MD    INDICATIONS:  A 54-year-old woman with recurrent lymphoma.  Chemotherapy is   indicated and placement of a port for deliver of the chemotherapy is therefore   indicated.    DESCRIPTION OF PROCEDURE:  The procedure was discussed in detail with the   patient including risk of bleeding, infection, abscess, and hematoma.  I also   discussed risk of vascular injury, port nonfunction and port infection.  She   understood all the above and wished to proceed.  She was placed under   anesthesia by Dr. Ruggiero.  Her right neck and chest were prepped with   chlorhexidine prep and sterile drapes.  After a time-out, the right internal   jugular vein was cannulated without difficulty.  Wire was threaded using   Seldinger technique.  Appropriate location of the wire was confirmed with   fluoroscopy.  A chest wall site was then created for placement of the   reservoir.  A tunnel was then created between the wire entry site and the   chest wall site.  Catheter was brought through the tunnel.  Dilator and   peel-away introducer were then passed over the wire.  The dilator and wire   were removed leaving the peel-away introducer in place.  Catheter was passed   through the peel-away introducer.  Peel-away introducer was then peeled away.    Using fluoroscopy, appropriate point for division of the catheter for   connection to the port was determined.  This was at approximately 20 cm.  Port   was connected to the catheter and the catheter was locked into place with the   locking device.  The port was noted to draw back and flush appropriately.    Appropriate location of the tip of the catheter was  confirmed with   fluoroscopy.  The catheter was then secured to the chest wall with 2-0 Prolene   suture.  Subcutaneous tissue was approximated with 3-0 Vicryl sutures and the   skin with a 4-0 Monocryl.  The wire entry site skin was approximated with 4-0   Monocryl as well.  Sterile dressings were placed.  The patient tolerated the   procedure well without apparent complication.  Final counts were reported as   correct.       ____________________________________     MD JAMIE DOVER / NTS    DD:  10/24/2019 16:23:19  DT:  10/24/2019 16:30:44    D#:  [id number]  Job#:  [id number]    cc: Daphne Sue MD

## 2019-10-24 NOTE — OR SURGEON
Immediate Post OP Note    PreOp Diagnosis: lymphoma    PostOp Diagnosis: lymphoma    Procedure(s):  INSERTION, CATHETER- PORT A CATH - Wound Class: Clean    Surgeon(s):  Jose Brewster M.D.    Anesthesiologist/Type of Anesthesia:  Anesthesiologist: Juan J Ruggiero M.D./General    Surgical Staff:  Circulator: Janis Modi R.N.  Scrub Person: Marika Harrison  Radiology Technologist: Oscar Salinas    Specimens removed if any:  * No specimens in log *    Estimated Blood Loss: 25 cc    Findings: grossly normal anatomy    Complications: none        10/24/2019 4:19 PM Jose Brewster M.D.

## 2019-10-24 NOTE — ANESTHESIA POSTPROCEDURE EVALUATION
Patient: Haroldo Cameron    Procedure Summary     Date:  10/24/19 Room / Location:  [facility] ROOM 26 / SURGERY SAME DAY [facility]    Anesthesia Start:  1541 Anesthesia Stop:      Procedure:  INSERTION, CATHETER- PORT A CATH (Right ) Diagnosis:  (FOLLICULAR LYMPHOMA GRADE 1)    Surgeon:  Jose Brewster M.D. Responsible Provider:  Juan J Ruggiero M.D.    Anesthesia Type:  general ASA Status:  2          Final Anesthesia Type: general  Last vitals  BP   Blood Pressure: 159/92    Temp   36.8 °C (98.3 °F)    Pulse   Pulse: 84   Resp   18    SpO2   96 %      Anesthesia Post Evaluation    Patient location during evaluation: PACU  Patient participation: complete - patient participated  Level of consciousness: awake and alert  Pain score: 0    Airway patency: patent  Anesthetic complications: no  Cardiovascular status: hemodynamically stable  Respiratory status: acceptable  Hydration status: euvolemic    PONV: none           Nurse Pain Score: 0 (NPRS)

## 2019-10-24 NOTE — ANESTHESIA PROCEDURE NOTES
Airway  Date/Time: 10/24/2019 3:45 PM  Performed by: Juan J Ruggiero M.D.  Authorized by: Juan J Ruggiero M.D.     Location:  OR  Urgency:  Elective  Difficult Airway: No    Indications for Airway Management:  Anesthesia  Spontaneous Ventilation: absent    Sedation Level:  Deep  Preoxygenated: Yes    Mask Difficulty Assessment:  0 - not attempted  Final Airway Type:  Supraglottic airway  Final Supraglottic Airway:  Standard LMA  SGA Size:  4  Number of Attempts at Approach:  1  Number of Other Approaches Attempted:  0

## 2019-10-24 NOTE — ANESTHESIA PREPROCEDURE EVALUATION
Relevant Problems   CARDIAC   (+) DVT (deep venous thrombosis) (HCC)      ENDO   (+) Hypothyroidism       Physical Exam    Airway   Mallampati: II  TM distance: >3 FB  Neck ROM: full       Cardiovascular - normal exam  Rhythm: regular  Rate: normal  (-) murmur     Dental - normal exam         Pulmonary - normal exam  Breath sounds clear to auscultation     Abdominal    Neurological - normal exam                 Anesthesia Plan    ASA 2       Plan - general       Airway plan will be LMA        Induction: intravenous    Postoperative Plan: Postoperative administration of opioids is intended.    Pertinent diagnostic labs and testing reviewed    Informed Consent:    Anesthetic plan and risks discussed with patient.    Use of blood products discussed with: patient whom consented to blood products.

## 2019-10-24 NOTE — ANESTHESIA TIME REPORT
Anesthesia Start and Stop Event Times     Date Time Event    10/24/2019 1540 Ready for Procedure     1541 Anesthesia Start        Responsible Staff  10/24/19    Name Role Begin End    Juan J Ruggiero M.D. Anesth 1541         Preop Diagnosis (Free Text):  Pre-op Diagnosis     FOLLICULAR LYMPHOMA GRADE 1        Preop Diagnosis (Codes):    Post op Diagnosis  Lymphoma (HCC)      Premium Reason  A. 3PM - 7AM    Comments:

## 2019-10-24 NOTE — OR NURSING
1623 Pt to PACU from OR. Report from anesthesia and OR RN. LMA in place, sats low on RA, placed on 6L blow by O2. Respirations even and unlabored. VSS.     1714 Pt resting, appears comfortable, eyes closed. Respirations even and unlabored. No needs at this time.  Family called in lobby for updates.     1752 Report to CORINE Jonas.

## 2019-10-24 NOTE — ANESTHESIA QCDR
2019 [facility] Clinical Data Registry (for Quality Improvement)     Postoperative nausea/vomiting risk protocol (Adult = 18 yrs and Pediatric 3-17 yrs)- (430 and 463)  General inhalation anesthetic (NOT TIVA) with PONV risk factors: Yes  Provision of anti-emetic therapy with at least 2 different classes of agents: Yes   Patient DID NOT receive anti-emetic therapy and reason is documented in Medical Record:  N/A    Multimodal Pain Management- (AQI59)  Patient undergoing Elective Surgery (i.e. Outpatient, or ASC, or Prescheduled Surgery prior to Hospital Admission): Yes  Use of Multimodal Pain Management, two or more drugs and/or interventions, NOT including systemic opioids: Yes   Exception: Documented allergy to multiple classes of analgesics:  N/A    PACU assessment of acute postoperative pain prior to Anesthesia Care End- Applies to Patients Age = 18- (ABG7)  Initial PACU pain score is which of the following: < 7/10  Patient unable to report pain score: N/A    Post-anesthetic transfer of care checklist/protocol to PACU/ICU- (426 and 427)  Upon conclusion of case, patient transferred to which of the following locations: PACU/Non-ICU  Use of transfer checklist/protocol: Yes  Exclusion: Service Performed in Patient Hospital Room (and thus did not require transfer): N/A    PACU Reintubation- (AQI31)  General anesthesia requiring endotracheal intubation (ETT) along with subsequent extubation in OR or PACU: No  Required reintubation in the PACU: N/A  Extubation was a planned trial documented in the medical record prior to removal of the original airway device: N/A    Unplanned admission to ICU related to anesthesia service up through end of PACU care- (MD51)  Unplanned admission to ICU (not initially anticipated at anesthesia start time): No          COLONOSCOPY OPERATIVE REPORT    DATE OF SERVICE: 6/11/2018    PROCEDURE: COLONOSCOPY with snare polypectomy times one of splenic flexure and biopsy polypectomy ×6 of rectosigmoid colon     REFERRING PROVIDER: Javier Ellison MD     PREOPERATIVE DIAGNOSIS: Lower Left Quadrant pain, Sigmoid diverticulosis     ANESTHESIA  Propofol anesthesia by anesthesia department    INDICATIONS FOR PROCEDURE  This is a 60 year old female comes in today for colonoscopy for Lower Left Quadrant pain, Sigmoid diverticulosis    DESCRIPTION OF PROCEDURE  The risks, benefits and alternatives to the procedure were discussed with  the patient.  Informed consent was obtained prior to the procedure.  The  patient was brought to the endoscopy room and positioned in the left  lateral decubitus position. A digital rectal examination revealed no palpable pelvic or rectal masses.  After adequate sedation, a colonoscope was  introduced transanally and under direct visualization advanced to the  cecum. Ileocecal valves intubated the distal ileum appeared normal. The valve base of cecum and appendiceal orifice appeared normal. The ascending and transverse colon appeared normal until the distal transverse colon the splenic flexure where a polyp was removed with snare polypectomy technique and suctioned through the scope. The sigmoid descending colon had moderate diverticulosis noted. In the rectosigmoid at 15 cm down to the anal verge 6 small polyps were removed with the cold biopsy forceps. There all appeared normal. The venous pattern in the mucosa was normal throughout was no evidence of colitis, Crohn's disease, or aphthous ulcerations. No proctitis fissures or fistula were noted. Careful examination was done as the scope was withdrawn slowly.  The patient tolerated the procedure well.    DESCRIPTION OF FINDINGS  Bowel preparation was good. Terminal ileum was examined for 5-10 cm and appeared normal. Colonic mucosa appeared normal throughout the  colon. No angiectasia was noted. Diverticulosis was moderate in the sigmoid and descending colon. 7 colon polyps were seen.  Retroflexion in the rectum showed bulging non-prolapsing internal hemorrhoids.      ENDOSCOPIC DIAGNOSIS  1. Flexible colonoscopy with snare polypectomy times one and biopsy polypectomy ×6    RECOMMENDATIONS  1.  Follow up biopsy.  2. High-fiber diet.   3. Will recommend a colonoscopy for 5 years .      Luca Castorena MD  6/11/2018  12:54 PM

## 2019-10-25 NOTE — DISCHARGE INSTRUCTIONS
ACTIVITY: Rest and take it easy for the first 24 hours.  A responsible adult is recommended to remain with you during that time.  It is normal to feel sleepy.  We encourage you to not do anything that requires balance, judgment or coordination.    MILD FLU-LIKE SYMPTOMS ARE NORMAL. YOU MAY EXPERIENCE GENERALIZED MUSCLE ACHES, THROAT IRRITATION, HEADACHE AND/OR SOME NAUSEA.    FOR 24 HOURS DO NOT:  Drive, operate machinery or run household appliances.  Drink beer or alcoholic beverages.   Make important decisions or sign legal documents.    SPECIAL INSTRUCTIONS: *Port ok to use**    DIET: To avoid nausea, slowly advance diet as tolerated, avoiding spicy or greasy foods for the first day.  Add more substantial food to your diet according to your physician's instructions.  Babies can be fed formula or breast milk as soon as they are hungry.  INCREASE FLUIDS AND FIBER TO AVOID CONSTIPATION.    SURGICAL DRESSING/BATHING: *See instruction sheet. **    FOLLOW-UP APPOINTMENT:  A follow-up appointment should be arranged with your doctor in 1-2 weeks; call to schedule.    You should CALL YOUR PHYSICIAN if you develop:  Fever greater than 101 degrees F.  Pain not relieved by medication, or persistent nausea or vomiting.  Excessive bleeding (blood soaking through dressing) or unexpected drainage from the wound.  Extreme redness or swelling around the incision site, drainage of pus or foul smelling drainage.  Inability to urinate or empty your bladder within 8 hours.  Problems with breathing or chest pain.    You should call 911 if you develop problems with breathing or chest pain.  If you are unable to contact your doctor or surgical center, you should go to the nearest emergency room or urgent care center.  Physician's telephone #: Dr. Brewster [phone]    If any questions arise, call your doctor.  If your doctor is not available, please feel free to call the Surgical Center at [phone].  The Center is open Monday  through Friday from 7AM to 7PM.  You can also call the HEALTH HOTLINE open 24 hours/day, 7 days/week and speak to a nurse at [phone], or toll free at [phone].    A registered nurse may call you a few days after your surgery to see how you are doing after your procedure.    MEDICATIONS: Resume taking daily medication.  Take prescribed pain medication with food.  If no medication is prescribed, you may take non-aspirin pain medication if needed.  PAIN MEDICATION CAN BE VERY CONSTIPATING.  Take a stool softener or laxative such as senokot, pericolace, or milk of magnesia if needed.    Prescription given for Percocet.  Last pain medication given at 3:50 pm-Toradol (NSAID), wait until 10 pm before taking any Ibuprofen.    If your physician has prescribed pain medication that includes Acetaminophen (Tylenol), do not take additional Acetaminophen (Tylenol) while taking the prescribed medication.    Depression / Suicide Risk    As you are discharged from this [facility] facility, it is important to learn how to keep safe from harming yourself.    Recognize the warning signs:  · Abrupt changes in personality, positive or negative- including increase in energy   · Giving away possessions  · Change in eating patterns- significant weight changes-  positive or negative  · Change in sleeping patterns- unable to sleep or sleeping all the time   · Unwillingness or inability to communicate  · Depression  · Unusual sadness, discouragement and loneliness  · Talk of wanting to die  · Neglect of personal appearance   · Rebelliousness- reckless behavior  · Withdrawal from people/activities they love  · Confusion- inability to concentrate     If you or a loved one observes any of these behaviors or has concerns about self-harm, here's what you can do:  · Talk about it- your feelings and reasons for harming yourself  · Remove any means that you might use to hurt yourself (examples: pills, rope, extension cords,  firearm)  · Get professional help from the community (Mental Health, Substance Abuse, psychological counseling)  · Do not be alone:Call your Safe Contact- someone whom you trust who will be there for you.  · Call your local CRISIS HOTLINE 000-0634 or 259-165-4958  · Call your local Children's Mobile Crisis Response Team [place] (867) 845-7459 or www.[employer]  · Call the toll free National Suicide Prevention Hotlines   · National Suicide Prevention Lifeline 156-484-LEBK (4058)  · National Hope Line Network 800-SUICIDE (423-5732)

## 2019-10-25 NOTE — OR NURSING
1752-Received handoff report from Ashia QUINTERO RN.  Resting.  RR even, unlabored.  Prefers no visitors at this time.  States comfort    1830-Up to BR-void, dressed for DC.  Transferred to phase II per criteria    1845-Friend in to visit    1850-DC instructions discussed & signed by Loretta SCHWAB RN.  Questions answered.    1905-DC via WC to car.  Gait steady

## 2019-11-14 RX ORDER — SODIUM CHLORIDE 9 MG/ML
INJECTION, SOLUTION INTRAVENOUS CONTINUOUS
Status: CANCELLED | OUTPATIENT
Start: 2019-11-19

## 2019-11-19 ENCOUNTER — HOSPITAL ENCOUNTER (OUTPATIENT)
Facility: MEDICAL CENTER | Age: 54
End: 2019-11-19
Attending: INTERNAL MEDICINE | Admitting: INTERNAL MEDICINE
Payer: COMMERCIAL

## 2019-11-19 ENCOUNTER — APPOINTMENT (OUTPATIENT)
Dept: RADIOLOGY | Facility: MEDICAL CENTER | Age: 54
End: 2019-11-19
Attending: INTERNAL MEDICINE
Payer: COMMERCIAL

## 2019-11-19 VITALS
BODY MASS INDEX: 29.12 KG/M2 | RESPIRATION RATE: 16 BRPM | HEART RATE: 74 BPM | DIASTOLIC BLOOD PRESSURE: 58 MMHG | WEIGHT: 181.22 LBS | OXYGEN SATURATION: 95 % | TEMPERATURE: 98 F | HEIGHT: 66 IN | SYSTOLIC BLOOD PRESSURE: 105 MMHG

## 2019-11-19 DIAGNOSIS — C82.03 FOLLICULAR LYMPHOMA GRADE I OF INTRA-ABDOMINAL LYMPH NODES (HCC): ICD-10-CM

## 2019-11-19 DIAGNOSIS — I82.402 DEEP VEIN THROMBOSIS (DVT) OF LEFT LOWER EXTREMITY, UNSPECIFIED CHRONICITY, UNSPECIFIED VEIN (HCC): ICD-10-CM

## 2019-11-19 LAB
ERYTHROCYTE [DISTWIDTH] IN BLOOD BY AUTOMATED COUNT: 41.2 FL (ref 35.9–50)
HCT VFR BLD AUTO: 43.9 % (ref 37–47)
HGB BLD-MCNC: 14.8 G/DL (ref 12–16)
INR PPP: 0.94 (ref 0.87–1.13)
MCH RBC QN AUTO: 28.9 PG (ref 27–33)
MCHC RBC AUTO-ENTMCNC: 33.7 G/DL (ref 33.6–35)
MCV RBC AUTO: 85.7 FL (ref 81.4–97.8)
PATHOLOGY CONSULT NOTE: NORMAL
PLATELET # BLD AUTO: 124 K/UL (ref 164–446)
PMV BLD AUTO: 9 FL (ref 9–12.9)
PROTHROMBIN TIME: 12.8 SEC (ref 12–14.6)
RBC # BLD AUTO: 5.12 M/UL (ref 4.2–5.4)
WBC # BLD AUTO: 5 K/UL (ref 4.8–10.8)

## 2019-11-19 PROCEDURE — 85027 COMPLETE CBC AUTOMATED: CPT

## 2019-11-19 PROCEDURE — 700105 HCHG RX REV CODE 258: Performed by: NURSE PRACTITIONER

## 2019-11-19 PROCEDURE — 88360 TUMOR IMMUNOHISTOCHEM/MANUAL: CPT

## 2019-11-19 PROCEDURE — 700111 HCHG RX REV CODE 636 W/ 250 OVERRIDE (IP): Performed by: RADIOLOGY

## 2019-11-19 PROCEDURE — 99153 MOD SED SAME PHYS/QHP EA: CPT

## 2019-11-19 PROCEDURE — 85610 PROTHROMBIN TIME: CPT

## 2019-11-19 PROCEDURE — 160002 HCHG RECOVERY MINUTES (STAT)

## 2019-11-19 PROCEDURE — 88305 TISSUE EXAM BY PATHOLOGIST: CPT

## 2019-11-19 PROCEDURE — 88341 IMHCHEM/IMCYTCHM EA ADD ANTB: CPT

## 2019-11-19 PROCEDURE — 88342 IMHCHEM/IMCYTCHM 1ST ANTB: CPT

## 2019-11-19 PROCEDURE — 700111 HCHG RX REV CODE 636 W/ 250 OVERRIDE (IP)

## 2019-11-19 RX ORDER — ONDANSETRON 2 MG/ML
4 INJECTION INTRAMUSCULAR; INTRAVENOUS PRN
Status: DISCONTINUED | OUTPATIENT
Start: 2019-11-19 | End: 2019-11-19 | Stop reason: HOSPADM

## 2019-11-19 RX ORDER — SODIUM CHLORIDE 9 MG/ML
INJECTION, SOLUTION INTRAVENOUS CONTINUOUS
Status: DISCONTINUED | OUTPATIENT
Start: 2019-11-19 | End: 2019-11-19 | Stop reason: HOSPADM

## 2019-11-19 RX ORDER — SODIUM CHLORIDE 9 MG/ML
500 INJECTION, SOLUTION INTRAVENOUS
Status: DISCONTINUED | OUTPATIENT
Start: 2019-11-19 | End: 2019-11-19 | Stop reason: HOSPADM

## 2019-11-19 RX ORDER — OXYCODONE HYDROCHLORIDE 10 MG/1
10 TABLET ORAL
Status: DISCONTINUED | OUTPATIENT
Start: 2019-11-19 | End: 2019-11-19 | Stop reason: HOSPADM

## 2019-11-19 RX ORDER — ONDANSETRON 2 MG/ML
INJECTION INTRAMUSCULAR; INTRAVENOUS
Status: COMPLETED
Start: 2019-11-19 | End: 2019-11-19

## 2019-11-19 RX ORDER — OXYCODONE HYDROCHLORIDE 5 MG/1
5 TABLET ORAL
Status: DISCONTINUED | OUTPATIENT
Start: 2019-11-19 | End: 2019-11-19 | Stop reason: HOSPADM

## 2019-11-19 RX ORDER — MIDAZOLAM HYDROCHLORIDE 1 MG/ML
.5-2 INJECTION INTRAMUSCULAR; INTRAVENOUS PRN
Status: DISCONTINUED | OUTPATIENT
Start: 2019-11-19 | End: 2019-11-19 | Stop reason: HOSPADM

## 2019-11-19 RX ORDER — HYDROMORPHONE HYDROCHLORIDE 2 MG/ML
0.5 INJECTION, SOLUTION INTRAMUSCULAR; INTRAVENOUS; SUBCUTANEOUS
Status: DISCONTINUED | OUTPATIENT
Start: 2019-11-19 | End: 2019-11-19 | Stop reason: HOSPADM

## 2019-11-19 RX ORDER — MIDAZOLAM HYDROCHLORIDE 1 MG/ML
INJECTION INTRAMUSCULAR; INTRAVENOUS
Status: COMPLETED
Start: 2019-11-19 | End: 2019-11-19

## 2019-11-19 RX ADMIN — ONDANSETRON 4 MG: 2 INJECTION INTRAMUSCULAR; INTRAVENOUS at 13:30

## 2019-11-19 RX ADMIN — SODIUM CHLORIDE: 9 INJECTION, SOLUTION INTRAVENOUS at 12:08

## 2019-11-19 RX ADMIN — FENTANYL CITRATE 25 MCG: 50 INJECTION, SOLUTION INTRAMUSCULAR; INTRAVENOUS at 13:47

## 2019-11-19 RX ADMIN — MIDAZOLAM 1 MG: 1 INJECTION INTRAMUSCULAR; INTRAVENOUS at 13:42

## 2019-11-19 RX ADMIN — MIDAZOLAM HYDROCHLORIDE 2 MG: 1 INJECTION, SOLUTION INTRAMUSCULAR; INTRAVENOUS at 13:34

## 2019-11-19 RX ADMIN — FENTANYL CITRATE 25 MCG: 50 INJECTION, SOLUTION INTRAMUSCULAR; INTRAVENOUS at 13:35

## 2019-11-19 RX ADMIN — MIDAZOLAM 2 MG: 1 INJECTION INTRAMUSCULAR; INTRAVENOUS at 13:34

## 2019-11-19 RX ADMIN — FENTANYL CITRATE 50 MCG: 50 INJECTION, SOLUTION INTRAMUSCULAR; INTRAVENOUS at 13:43

## 2019-11-19 NOTE — OR SURGEON
Immediate Post- Operative Note        PostOp Diagnosis: Right RP Node, hx of Lymphoma      Procedure(s):CT BX Right RP Node    Estimated Blood Loss: Less than 5 ml        Complications: None            11/19/2019     1:58 PM     Ronnell Mitchell

## 2019-11-19 NOTE — DISCHARGE INSTRUCTIONS
ACTIVITY: Rest and take it easy for the first 24 hours.  A responsible adult is recommended to remain with you during that time.  It is normal to feel sleepy.  We encourage you to not do anything that requires balance, judgment or coordination.    MILD FLU-LIKE SYMPTOMS ARE NORMAL. YOU MAY EXPERIENCE GENERALIZED MUSCLE ACHES, THROAT IRRITATION, HEADACHE AND/OR SOME NAUSEA.    FOR 24 HOURS DO NOT:  Drive, operate machinery or run household appliances.  Drink beer or alcoholic beverages.   Make important decisions or sign legal documents.    SPECIAL INSTRUCTIONS: keep incision dry for 24 hours    DIET: To avoid nausea, slowly advance diet as tolerated, avoiding spicy or greasy foods for the first day.  Add more substantial food to your diet according to your physician's instructions.  Babies can be fed formula or breast milk as soon as they are hungry.  INCREASE FLUIDS AND FIBER TO AVOID CONSTIPATION.    SURGICAL DRESSING/BATHING: do not shower for 24 hours, may shower tomorrow 11/20/2019 after 3pm    FOLLOW-UP APPOINTMENT:  A follow-up appointment should be arranged with your doctor Vikram at [phone]; call to schedule.    You should CALL YOUR PHYSICIAN if you develop:  Fever greater than 101 degrees F.  Pain not relieved by medication, or persistent nausea or vomiting.  Excessive bleeding (blood soaking through dressing) or unexpected drainage from the wound.  Extreme redness or swelling around the incision site, drainage of pus or foul smelling drainage.  Inability to urinate or empty your bladder within 8 hours.  Problems with breathing or chest pain.    You should call 911 if you develop problems with breathing or chest pain.  If you are unable to contact your doctor or surgical center, you should go to the nearest emergency room or urgent care center.  Physician's telephone #: [phone]    If any questions arise, call your doctor.  If your doctor is not available, please feel free to call the Surgical  Center at [phone].  The Center is open Monday through Friday from 7AM to 7PM.  You can also call the HEALTH HOTLINE open 24 hours/day, 7 days/week and speak to a nurse at [phone], or toll free at [phone].    A registered nurse may call you a few days after your surgery to see how you are doing after your procedure.    MEDICATIONS: Resume taking daily medication.  Take prescribed pain medication with food.  If no medication is prescribed, you may take non-aspirin pain medication if needed.  PAIN MEDICATION CAN BE VERY CONSTIPATING.  Take a stool softener or laxative such as senokot, pericolace, or milk of magnesia if needed.    If your physician has prescribed pain medication that includes Acetaminophen (Tylenol), do not take additional Acetaminophen (Tylenol) while taking the prescribed medication.    Depression / Suicide Risk    As you are discharged from this [facility] facility, it is important to learn how to keep safe from harming yourself.    Recognize the warning signs:  · Abrupt changes in personality, positive or negative- including increase in energy   · Giving away possessions  · Change in eating patterns- significant weight changes-  positive or negative  · Change in sleeping patterns- unable to sleep or sleeping all the time   · Unwillingness or inability to communicate  · Depression  · Unusual sadness, discouragement and loneliness  · Talk of wanting to die  · Neglect of personal appearance   · Rebelliousness- reckless behavior  · Withdrawal from people/activities they love  · Confusion- inability to concentrate     If you or a loved one observes any of these behaviors or has concerns about self-harm, here's what you can do:  · Talk about it- your feelings and reasons for harming yourself  · Remove any means that you might use to hurt yourself (examples: pills, rope, extension cords, firearm)  · Get professional help from the community (Mental Health, Substance Abuse,  psychological counseling)  · Do not be alone:Call your Safe Contact- someone whom you trust who will be there for you.  · Call your local CRISIS HOTLINE 979-9452 or 486-837-8094  · Call your local Children's Mobile Crisis Response Team [place] (388) 109-4412 or www.Press About Us  · Call the toll free National Suicide Prevention Hotlines   · National Suicide Prevention Lifeline 460-330-KHFH (1830)  · National Hope Line Network 800-SUICIDE (523-6572)

## 2019-11-19 NOTE — H&P
History and Physical    Date: 11/19/2019    PCP: FARIDEH Granados      CC: Enlarged Lymph Nodes    HPI: This is a 54 y.o. female who is presenting lymphadenopathy    Past Medical History:   Diagnosis Date   • Acid reflux    • Anesthesia     post op n/v   • Cancer (HCC) 1997    Hodgkins Lymphoma   • Cancer (HCC) 2014    non-hodgkins lymphoma   • Diabetes (HCC)     oral meds   • DVT (deep venous thrombosis) (HCC) 08/15/2019    on Xarelto   • Heart burn     has new Rx for pantoprazole - has not yet started   • Personal history of venous thrombosis and embolism     arm, due to IV from  chemo   • Snoring    • Tuberculosis     skin test (+), cxr (-)   • Unspecified disorder of thyroid     hypothyroid       Past Surgical History:   Procedure Laterality Date   • CATH PLACEMENT Right 10/24/2019    Procedure: INSERTION, CATHETER- PORT A CATH;  Surgeon: Jose Brewster M.D.;  Location: SURGERY SAME DAY [facility];  Service: General   • ANKLE ARTHROSCOPY  12/11/2013    Performed by Yao Del Cid M.D. at SURGERY [facility] ORS   • ANKLE LIGAMENT RECONSTRUCTION  12/11/2013    Performed by Yao Del Cid M.D. at SURGERY [facility] ORS   • RAUL BY LAPAROSCOPY  3/1/2012    Performed by KELLY QUINTERO at SURGERY [facility] ORS   • LYMPH NODE EXCISION  1997    neck   • CATH, PORT-A-CATH  1997       Current Facility-Administered Medications   Medication Dose Route Frequency Provider Last Rate Last Dose   • NS infusion   Intravenous Continuous Sydnie L Dariel, A.P.N. 125 mL/hr at 11/19/19 1208          Social History     Socioeconomic History   • Marital status:      Spouse name: Not on file   • Number of children: Not on file   • Years of education: Not on file   • Highest education level: Not on file   Occupational History   • Not on file   Social Needs   • Financial resource strain: Not on file   • Food insecurity:     Worry: Not on file     Inability: Not on file   • Transportation needs:     Medical: Not on  file     Non-medical: Not on file   Tobacco Use   • Smoking status: Never Smoker   • Smokeless tobacco: Never Used   Substance and Sexual Activity   • Alcohol use: No   • Drug use: No   • Sexual activity: Not on file   Lifestyle   • Physical activity:     Days per week: Not on file     Minutes per session: Not on file   • Stress: Not on file   Relationships   • Social connections:     Talks on phone: Not on file     Gets together: Not on file     Attends Congregational service: Not on file     Active member of club or organization: Not on file     Attends meetings of clubs or organizations: Not on file     Relationship status: Not on file   • Intimate partner violence:     Fear of current or ex partner: Not on file     Emotionally abused: Not on file     Physically abused: Not on file     Forced sexual activity: Not on file   Other Topics Concern   • Not on file   Social History Narrative   • Not on file       Family History   Problem Relation Age of Onset   • Cancer Other    • Diabetes Other        Allergies:  Iodine; Compazine; Morphine; Penicillins; Tape; and Vicodin [hydrocodone-acetaminophen]    Review of Systems:  Negative except for lymphadenopathy    Physical Exam    Vital Signs  Blood Pressure: 137/84   Temperature: 36.9 °C (98.5 °F)   Pulse: 77   Respiration: 18   Pulse Oximetry: 95 %        Labs:  Recent Labs     11/19/19  1220   WBC 5.0   RBC 5.12   HEMOGLOBIN 14.8   HEMATOCRIT 43.9   MCV 85.7   MCH 28.9   MCHC 33.7   RDW 41.2   PLATELETCT 124*   MPV 9.0         Recent Labs     11/19/19  1220   INR 0.94     Recent Labs     11/19/19  1220   INR 0.94       Radiology:  CT-NEEDLE [id number]    (Results Pending)             Assessment and Plan:This is a 54 y.o. for CT Biopsy of retroperitoneal lymph node

## 2019-11-19 NOTE — PROGRESS NOTES
CT guided biopsy of right retroperitoneal node performed by Dr Mitchell via posterior right flank approach. Procedure BARs explained to patient by physician and consent obtained. Patient assisted to prone position on table. Patient was monitored and assessed continuously throughout procedure; ETCO2 34-40 with consistent waveform. Biopsy completed without complication. Posterior right abdominal puncture site CDI; sterile guaze/tegaderm dressing applied. Patient tolerated procedure quite well, but appropriately responsive afterward. Patient transferred to [facility] in good condition. Family updated and at bedside.    Core samples delivered to histology in formalin and RPMI.

## 2019-11-20 NOTE — OR NURSING
1422 Pt report received from IR nurse, pt brought over by RN on narendra. Pt placed on monitor, VSS, family at bedside. Pts incision site is clean, dry and soft, no S/S of bleeding. Pt was given water and a snack.     1435 pt is resting comfortably in bed no C/O pain, VSS, incision site is clean dry and soft.     1635 pt and family given D/C instructions, pt and family verbalized understanding. Pt was given information regarding biopsy. Pt going home with family in a wheelchair.

## 2020-01-27 ENCOUNTER — HOSPITAL ENCOUNTER (OUTPATIENT)
Dept: RADIOLOGY | Facility: MEDICAL CENTER | Age: 55
End: 2020-01-27
Attending: INTERNAL MEDICINE
Payer: COMMERCIAL

## 2020-01-27 DIAGNOSIS — C82.03 FOLLICULAR LYMPHOMA GRADE I OF INTRA-ABDOMINAL LYMPH NODES (HCC): ICD-10-CM

## 2020-01-27 PROCEDURE — 71250 CT THORAX DX C-: CPT

## 2020-01-29 ENCOUNTER — HOSPITAL ENCOUNTER (OUTPATIENT)
Dept: LAB | Facility: MEDICAL CENTER | Age: 55
End: 2020-01-29
Attending: INTERNAL MEDICINE
Payer: COMMERCIAL

## 2020-01-29 LAB
ALBUMIN SERPL BCP-MCNC: 4.5 G/DL (ref 3.2–4.9)
ALBUMIN/GLOB SERPL: 1.7 G/DL
ALP SERPL-CCNC: 94 U/L (ref 30–99)
ALT SERPL-CCNC: 48 U/L (ref 2–50)
ANION GAP SERPL CALC-SCNC: 11 MMOL/L (ref 0–11.9)
AST SERPL-CCNC: 31 U/L (ref 12–45)
BILIRUB SERPL-MCNC: 0.7 MG/DL (ref 0.1–1.5)
BUN SERPL-MCNC: 16 MG/DL (ref 8–22)
CALCIUM SERPL-MCNC: 10.2 MG/DL (ref 8.5–10.5)
CHLORIDE SERPL-SCNC: 98 MMOL/L (ref 96–112)
CO2 SERPL-SCNC: 28 MMOL/L (ref 20–33)
CREAT SERPL-MCNC: 1.23 MG/DL (ref 0.5–1.4)
GLOBULIN SER CALC-MCNC: 2.7 G/DL (ref 1.9–3.5)
GLUCOSE SERPL-MCNC: 320 MG/DL (ref 65–99)
LDH SERPL L TO P-CCNC: 164 U/L (ref 107–266)
POTASSIUM SERPL-SCNC: 4.1 MMOL/L (ref 3.6–5.5)
PROT SERPL-MCNC: 7.2 G/DL (ref 6–8.2)
SODIUM SERPL-SCNC: 137 MMOL/L (ref 135–145)
URATE SERPL-MCNC: 6.4 MG/DL (ref 1.9–8.2)

## 2020-01-29 PROCEDURE — 84550 ASSAY OF BLOOD/URIC ACID: CPT

## 2020-01-29 PROCEDURE — 80053 COMPREHEN METABOLIC PANEL: CPT

## 2020-01-29 PROCEDURE — 83615 LACTATE (LD) (LDH) ENZYME: CPT

## 2020-03-05 ENCOUNTER — APPOINTMENT (OUTPATIENT)
Dept: RADIOLOGY | Facility: MEDICAL CENTER | Age: 55
End: 2020-03-05
Attending: INTERNAL MEDICINE
Payer: COMMERCIAL

## 2020-03-27 ENCOUNTER — APPOINTMENT (OUTPATIENT)
Dept: RADIOLOGY | Facility: MEDICAL CENTER | Age: 55
End: 2020-03-27
Attending: INTERNAL MEDICINE
Payer: COMMERCIAL

## 2020-05-15 ENCOUNTER — HOSPITAL ENCOUNTER (OUTPATIENT)
Dept: RADIOLOGY | Facility: MEDICAL CENTER | Age: 55
End: 2020-05-15
Attending: INTERNAL MEDICINE
Payer: COMMERCIAL

## 2020-05-15 DIAGNOSIS — C82.03 FOLLICULAR LYMPHOMA GRADE I OF INTRA-ABDOMINAL LYMPH NODES (HCC): ICD-10-CM

## 2020-05-15 PROCEDURE — 71250 CT THORAX DX C-: CPT

## 2020-05-20 ENCOUNTER — HOSPITAL ENCOUNTER (OUTPATIENT)
Dept: LAB | Facility: MEDICAL CENTER | Age: 55
End: 2020-05-20
Attending: INTERNAL MEDICINE
Payer: COMMERCIAL

## 2020-05-20 LAB
ALBUMIN SERPL BCP-MCNC: 4.4 G/DL (ref 3.2–4.9)
ALBUMIN/GLOB SERPL: 2 G/DL
ALP SERPL-CCNC: 103 U/L (ref 30–99)
ALT SERPL-CCNC: 45 U/L (ref 2–50)
ANION GAP SERPL CALC-SCNC: 14 MMOL/L (ref 7–16)
AST SERPL-CCNC: 26 U/L (ref 12–45)
BILIRUB SERPL-MCNC: 0.5 MG/DL (ref 0.1–1.5)
BUN SERPL-MCNC: 16 MG/DL (ref 8–22)
CALCIUM SERPL-MCNC: 9.1 MG/DL (ref 8.5–10.5)
CHLORIDE SERPL-SCNC: 101 MMOL/L (ref 96–112)
CO2 SERPL-SCNC: 22 MMOL/L (ref 20–33)
CREAT SERPL-MCNC: 1.09 MG/DL (ref 0.5–1.4)
GLOBULIN SER CALC-MCNC: 2.2 G/DL (ref 1.9–3.5)
GLUCOSE SERPL-MCNC: 438 MG/DL (ref 65–99)
HAV IGM SERPL QL IA: NORMAL
HBV CORE IGM SER QL: NORMAL
HBV SURFACE AG SER QL: NORMAL
HCV AB SER QL: NORMAL
HIV 1+2 AB+HIV1 P24 AG SERPL QL IA: NORMAL
LDH SERPL L TO P-CCNC: 195 U/L (ref 107–266)
POTASSIUM SERPL-SCNC: 4.4 MMOL/L (ref 3.6–5.5)
PROT SERPL-MCNC: 6.6 G/DL (ref 6–8.2)
SODIUM SERPL-SCNC: 137 MMOL/L (ref 135–145)
URATE SERPL-MCNC: 5.3 MG/DL (ref 1.9–8.2)

## 2020-05-20 PROCEDURE — 80074 ACUTE HEPATITIS PANEL: CPT

## 2020-05-20 PROCEDURE — 80053 COMPREHEN METABOLIC PANEL: CPT

## 2020-05-20 PROCEDURE — 84550 ASSAY OF BLOOD/URIC ACID: CPT

## 2020-05-20 PROCEDURE — 87389 HIV-1 AG W/HIV-1&-2 AB AG IA: CPT

## 2020-05-20 PROCEDURE — 83615 LACTATE (LD) (LDH) ENZYME: CPT

## 2020-06-17 ENCOUNTER — HOSPITAL ENCOUNTER (OUTPATIENT)
Dept: LAB | Facility: MEDICAL CENTER | Age: 55
End: 2020-06-17
Attending: NURSE PRACTITIONER
Payer: COMMERCIAL

## 2020-06-17 LAB
ALBUMIN SERPL BCP-MCNC: 4.6 G/DL (ref 3.2–4.9)
ALBUMIN/GLOB SERPL: 1.9 G/DL
ALP SERPL-CCNC: 105 U/L (ref 30–99)
ALT SERPL-CCNC: 53 U/L (ref 2–50)
ANION GAP SERPL CALC-SCNC: 16 MMOL/L (ref 7–16)
AST SERPL-CCNC: 34 U/L (ref 12–45)
BILIRUB SERPL-MCNC: 0.6 MG/DL (ref 0.1–1.5)
BUN SERPL-MCNC: 18 MG/DL (ref 8–22)
CALCIUM SERPL-MCNC: 9.6 MG/DL (ref 8.5–10.5)
CHLORIDE SERPL-SCNC: 100 MMOL/L (ref 96–112)
CO2 SERPL-SCNC: 23 MMOL/L (ref 20–33)
CREAT SERPL-MCNC: 0.88 MG/DL (ref 0.5–1.4)
GLOBULIN SER CALC-MCNC: 2.4 G/DL (ref 1.9–3.5)
GLUCOSE SERPL-MCNC: 306 MG/DL (ref 65–99)
POTASSIUM SERPL-SCNC: 4.2 MMOL/L (ref 3.6–5.5)
PROT SERPL-MCNC: 7 G/DL (ref 6–8.2)
SODIUM SERPL-SCNC: 139 MMOL/L (ref 135–145)

## 2020-06-17 PROCEDURE — 80053 COMPREHEN METABOLIC PANEL: CPT

## 2020-06-25 ENCOUNTER — HOSPITAL ENCOUNTER (OUTPATIENT)
Dept: LAB | Facility: MEDICAL CENTER | Age: 55
End: 2020-06-25
Attending: NURSE PRACTITIONER
Payer: COMMERCIAL

## 2020-06-25 LAB
ALBUMIN SERPL BCP-MCNC: 4.2 G/DL (ref 3.2–4.9)
ALBUMIN/GLOB SERPL: 1.9 G/DL
ALP SERPL-CCNC: 99 U/L (ref 30–99)
ALT SERPL-CCNC: 69 U/L (ref 2–50)
ANION GAP SERPL CALC-SCNC: 14 MMOL/L (ref 7–16)
AST SERPL-CCNC: 32 U/L (ref 12–45)
BILIRUB SERPL-MCNC: 0.7 MG/DL (ref 0.1–1.5)
BUN SERPL-MCNC: 27 MG/DL (ref 8–22)
CALCIUM SERPL-MCNC: 9.1 MG/DL (ref 8.5–10.5)
CHLORIDE SERPL-SCNC: 99 MMOL/L (ref 96–112)
CO2 SERPL-SCNC: 23 MMOL/L (ref 20–33)
CREAT SERPL-MCNC: 1.13 MG/DL (ref 0.5–1.4)
GLOBULIN SER CALC-MCNC: 2.2 G/DL (ref 1.9–3.5)
GLUCOSE SERPL-MCNC: 395 MG/DL (ref 65–99)
LDH SERPL L TO P-CCNC: 196 U/L (ref 107–266)
POTASSIUM SERPL-SCNC: 4.7 MMOL/L (ref 3.6–5.5)
PROT SERPL-MCNC: 6.4 G/DL (ref 6–8.2)
SODIUM SERPL-SCNC: 136 MMOL/L (ref 135–145)
URATE SERPL-MCNC: 6.8 MG/DL (ref 1.9–8.2)

## 2020-06-25 PROCEDURE — 84550 ASSAY OF BLOOD/URIC ACID: CPT

## 2020-06-25 PROCEDURE — 83615 LACTATE (LD) (LDH) ENZYME: CPT

## 2020-06-25 PROCEDURE — 80053 COMPREHEN METABOLIC PANEL: CPT

## 2020-07-01 ENCOUNTER — HOSPITAL ENCOUNTER (OUTPATIENT)
Dept: LAB | Facility: MEDICAL CENTER | Age: 55
End: 2020-07-01
Attending: NURSE PRACTITIONER
Payer: COMMERCIAL

## 2020-07-01 PROCEDURE — 84550 ASSAY OF BLOOD/URIC ACID: CPT

## 2020-07-01 PROCEDURE — 83615 LACTATE (LD) (LDH) ENZYME: CPT

## 2020-07-01 PROCEDURE — 80053 COMPREHEN METABOLIC PANEL: CPT

## 2020-07-02 LAB
ALBUMIN SERPL BCP-MCNC: 4.2 G/DL (ref 3.2–4.9)
ALBUMIN/GLOB SERPL: 2.3 G/DL
ALP SERPL-CCNC: 89 U/L (ref 30–99)
ALT SERPL-CCNC: 44 U/L (ref 2–50)
ANION GAP SERPL CALC-SCNC: 15 MMOL/L (ref 7–16)
AST SERPL-CCNC: 22 U/L (ref 12–45)
BILIRUB SERPL-MCNC: 0.6 MG/DL (ref 0.1–1.5)
BUN SERPL-MCNC: 20 MG/DL (ref 8–22)
CALCIUM SERPL-MCNC: 9.4 MG/DL (ref 8.5–10.5)
CHLORIDE SERPL-SCNC: 100 MMOL/L (ref 96–112)
CO2 SERPL-SCNC: 24 MMOL/L (ref 20–33)
CREAT SERPL-MCNC: 0.89 MG/DL (ref 0.5–1.4)
GLOBULIN SER CALC-MCNC: 1.8 G/DL (ref 1.9–3.5)
GLUCOSE SERPL-MCNC: 392 MG/DL (ref 65–99)
LDH SERPL L TO P-CCNC: 198 U/L (ref 107–266)
POTASSIUM SERPL-SCNC: 4.1 MMOL/L (ref 3.6–5.5)
PROT SERPL-MCNC: 6 G/DL (ref 6–8.2)
SODIUM SERPL-SCNC: 139 MMOL/L (ref 135–145)
URATE SERPL-MCNC: 5.7 MG/DL (ref 1.9–8.2)

## 2020-09-01 ENCOUNTER — HOSPITAL ENCOUNTER (OUTPATIENT)
Dept: LAB | Facility: MEDICAL CENTER | Age: 55
End: 2020-09-01
Attending: NURSE PRACTITIONER
Payer: COMMERCIAL

## 2020-09-01 LAB
ALBUMIN SERPL BCP-MCNC: 4.3 G/DL (ref 3.2–4.9)
ALBUMIN/GLOB SERPL: 2.9 G/DL
ALP SERPL-CCNC: 98 U/L (ref 30–99)
ALT SERPL-CCNC: 54 U/L (ref 2–50)
ANION GAP SERPL CALC-SCNC: 18 MMOL/L (ref 7–16)
AST SERPL-CCNC: 38 U/L (ref 12–45)
BILIRUB SERPL-MCNC: 0.8 MG/DL (ref 0.1–1.5)
BUN SERPL-MCNC: 16 MG/DL (ref 8–22)
CALCIUM SERPL-MCNC: 9.2 MG/DL (ref 8.5–10.5)
CHLORIDE SERPL-SCNC: 102 MMOL/L (ref 96–112)
CO2 SERPL-SCNC: 22 MMOL/L (ref 20–33)
CREAT SERPL-MCNC: 0.94 MG/DL (ref 0.5–1.4)
GLOBULIN SER CALC-MCNC: 1.5 G/DL (ref 1.9–3.5)
GLUCOSE SERPL-MCNC: 356 MG/DL (ref 65–99)
LDH SERPL L TO P-CCNC: 323 U/L (ref 107–266)
POTASSIUM SERPL-SCNC: 3.9 MMOL/L (ref 3.6–5.5)
PROT SERPL-MCNC: 5.8 G/DL (ref 6–8.2)
SODIUM SERPL-SCNC: 142 MMOL/L (ref 135–145)
URATE SERPL-MCNC: 6.3 MG/DL (ref 1.9–8.2)

## 2020-09-01 PROCEDURE — 84550 ASSAY OF BLOOD/URIC ACID: CPT

## 2020-09-01 PROCEDURE — 80053 COMPREHEN METABOLIC PANEL: CPT

## 2020-09-01 PROCEDURE — 83615 LACTATE (LD) (LDH) ENZYME: CPT

## 2020-09-30 ENCOUNTER — HOSPITAL ENCOUNTER (OUTPATIENT)
Dept: LAB | Facility: MEDICAL CENTER | Age: 55
End: 2020-09-30
Attending: NURSE PRACTITIONER
Payer: COMMERCIAL

## 2020-09-30 PROCEDURE — 80053 COMPREHEN METABOLIC PANEL: CPT

## 2020-09-30 PROCEDURE — 84550 ASSAY OF BLOOD/URIC ACID: CPT

## 2020-09-30 PROCEDURE — 83615 LACTATE (LD) (LDH) ENZYME: CPT

## 2020-10-01 LAB
ALBUMIN SERPL BCP-MCNC: 4.6 G/DL (ref 3.2–4.9)
ALBUMIN/GLOB SERPL: 2.1 G/DL
ALP SERPL-CCNC: 108 U/L (ref 30–99)
ALT SERPL-CCNC: 63 U/L (ref 2–50)
ANION GAP SERPL CALC-SCNC: 18 MMOL/L (ref 7–16)
AST SERPL-CCNC: 22 U/L (ref 12–45)
BILIRUB SERPL-MCNC: 0.7 MG/DL (ref 0.1–1.5)
BUN SERPL-MCNC: 23 MG/DL (ref 8–22)
CALCIUM SERPL-MCNC: 10 MG/DL (ref 8.5–10.5)
CHLORIDE SERPL-SCNC: 97 MMOL/L (ref 96–112)
CO2 SERPL-SCNC: 20 MMOL/L (ref 20–33)
CREAT SERPL-MCNC: 1.05 MG/DL (ref 0.5–1.4)
GLOBULIN SER CALC-MCNC: 2.2 G/DL (ref 1.9–3.5)
GLUCOSE SERPL-MCNC: 519 MG/DL (ref 65–99)
LDH SERPL L TO P-CCNC: 257 U/L (ref 107–266)
POTASSIUM SERPL-SCNC: 4.2 MMOL/L (ref 3.6–5.5)
PROT SERPL-MCNC: 6.8 G/DL (ref 6–8.2)
SODIUM SERPL-SCNC: 135 MMOL/L (ref 135–145)
URATE SERPL-MCNC: 6.2 MG/DL (ref 1.9–8.2)

## 2020-12-01 ENCOUNTER — HOSPITAL ENCOUNTER (OUTPATIENT)
Dept: LAB | Facility: MEDICAL CENTER | Age: 55
End: 2020-12-01
Attending: NURSE PRACTITIONER
Payer: COMMERCIAL

## 2020-12-01 PROCEDURE — 80053 COMPREHEN METABOLIC PANEL: CPT

## 2020-12-02 LAB
ALBUMIN SERPL BCP-MCNC: 4.4 G/DL (ref 3.2–4.9)
ALBUMIN/GLOB SERPL: 2.8 G/DL
ALP SERPL-CCNC: 95 U/L (ref 30–99)
ALT SERPL-CCNC: 44 U/L (ref 2–50)
ANION GAP SERPL CALC-SCNC: 13 MMOL/L (ref 7–16)
AST SERPL-CCNC: 36 U/L (ref 12–45)
BILIRUB SERPL-MCNC: 0.6 MG/DL (ref 0.1–1.5)
BUN SERPL-MCNC: 11 MG/DL (ref 8–22)
CALCIUM SERPL-MCNC: 9.4 MG/DL (ref 8.5–10.5)
CHLORIDE SERPL-SCNC: 106 MMOL/L (ref 96–112)
CO2 SERPL-SCNC: 21 MMOL/L (ref 20–33)
CREAT SERPL-MCNC: 0.81 MG/DL (ref 0.5–1.4)
GLOBULIN SER CALC-MCNC: 1.6 G/DL (ref 1.9–3.5)
GLUCOSE SERPL-MCNC: 288 MG/DL (ref 65–99)
POTASSIUM SERPL-SCNC: 4.3 MMOL/L (ref 3.6–5.5)
PROT SERPL-MCNC: 6 G/DL (ref 6–8.2)
SODIUM SERPL-SCNC: 140 MMOL/L (ref 135–145)

## 2020-12-29 ENCOUNTER — HOSPITAL ENCOUNTER (OUTPATIENT)
Facility: MEDICAL CENTER | Age: 55
End: 2020-12-29
Attending: NURSE PRACTITIONER
Payer: COMMERCIAL

## 2020-12-29 LAB
ALBUMIN SERPL BCP-MCNC: 4.7 G/DL (ref 3.2–4.9)
ALBUMIN/GLOB SERPL: 2.9 G/DL
ALP SERPL-CCNC: 98 U/L (ref 30–99)
ALT SERPL-CCNC: 43 U/L (ref 2–50)
ANION GAP SERPL CALC-SCNC: 14 MMOL/L (ref 7–16)
AST SERPL-CCNC: 33 U/L (ref 12–45)
BILIRUB SERPL-MCNC: 0.7 MG/DL (ref 0.1–1.5)
BUN SERPL-MCNC: 15 MG/DL (ref 8–22)
CALCIUM SERPL-MCNC: 9.9 MG/DL (ref 8.5–10.5)
CHLORIDE SERPL-SCNC: 102 MMOL/L (ref 96–112)
CO2 SERPL-SCNC: 23 MMOL/L (ref 20–33)
CREAT SERPL-MCNC: 0.99 MG/DL (ref 0.5–1.4)
GLOBULIN SER CALC-MCNC: 1.6 G/DL (ref 1.9–3.5)
GLUCOSE SERPL-MCNC: 368 MG/DL (ref 65–99)
POTASSIUM SERPL-SCNC: 4.2 MMOL/L (ref 3.6–5.5)
PROT SERPL-MCNC: 6.3 G/DL (ref 6–8.2)
SODIUM SERPL-SCNC: 139 MMOL/L (ref 135–145)

## 2020-12-29 PROCEDURE — 80053 COMPREHEN METABOLIC PANEL: CPT

## 2021-02-23 ENCOUNTER — HOSPITAL ENCOUNTER (OUTPATIENT)
Dept: LAB | Facility: MEDICAL CENTER | Age: 56
End: 2021-02-23
Attending: NURSE PRACTITIONER
Payer: COMMERCIAL

## 2021-02-23 LAB
ALBUMIN SERPL BCP-MCNC: 4.4 G/DL (ref 3.2–4.9)
ALBUMIN/GLOB SERPL: 2 G/DL
ALP SERPL-CCNC: 96 U/L (ref 30–99)
ALT SERPL-CCNC: 50 U/L (ref 2–50)
ANION GAP SERPL CALC-SCNC: 13 MMOL/L (ref 7–16)
AST SERPL-CCNC: 37 U/L (ref 12–45)
BILIRUB SERPL-MCNC: 0.6 MG/DL (ref 0.1–1.5)
BUN SERPL-MCNC: 18 MG/DL (ref 8–22)
CALCIUM SERPL-MCNC: 9.9 MG/DL (ref 8.5–10.5)
CHLORIDE SERPL-SCNC: 98 MMOL/L (ref 96–112)
CO2 SERPL-SCNC: 22 MMOL/L (ref 20–33)
CREAT SERPL-MCNC: 0.97 MG/DL (ref 0.5–1.4)
GLOBULIN SER CALC-MCNC: 2.2 G/DL (ref 1.9–3.5)
GLUCOSE SERPL-MCNC: 384 MG/DL (ref 65–99)
LDH SERPL L TO P-CCNC: 230 U/L (ref 107–266)
POTASSIUM SERPL-SCNC: 4.3 MMOL/L (ref 3.6–5.5)
PROT SERPL-MCNC: 6.6 G/DL (ref 6–8.2)
SODIUM SERPL-SCNC: 133 MMOL/L (ref 135–145)
URATE SERPL-MCNC: 6.2 MG/DL (ref 1.9–8.2)

## 2021-02-23 PROCEDURE — 84550 ASSAY OF BLOOD/URIC ACID: CPT

## 2021-02-23 PROCEDURE — 83615 LACTATE (LD) (LDH) ENZYME: CPT

## 2021-02-23 PROCEDURE — 80053 COMPREHEN METABOLIC PANEL: CPT

## 2021-04-11 ENCOUNTER — HOSPITAL ENCOUNTER (INPATIENT)
Facility: MEDICAL CENTER | Age: 56
LOS: 10 days | DRG: 872 | End: 2021-04-21
Attending: HOSPITALIST | Admitting: STUDENT IN AN ORGANIZED HEALTH CARE EDUCATION/TRAINING PROGRAM
Payer: COMMERCIAL

## 2021-04-11 DIAGNOSIS — E87.6 HYPOKALEMIA: ICD-10-CM

## 2021-04-11 PROBLEM — A41.9 SEPSIS (HCC): Status: ACTIVE | Noted: 2021-04-11

## 2021-04-11 PROBLEM — R19.09 OTHER INTRA-ABDOMINAL AND PELVIC SWELLING, MASS AND LUMP: Status: ACTIVE | Noted: 2021-04-11

## 2021-04-11 PROBLEM — D69.6 THROMBOCYTOPENIA (HCC): Status: ACTIVE | Noted: 2021-04-11

## 2021-04-11 PROBLEM — C85.90 NON-HODGKIN LYMPHOMA (HCC): Status: ACTIVE | Noted: 2021-04-11

## 2021-04-11 PROCEDURE — 700102 HCHG RX REV CODE 250 W/ 637 OVERRIDE(OP): Performed by: STUDENT IN AN ORGANIZED HEALTH CARE EDUCATION/TRAINING PROGRAM

## 2021-04-11 PROCEDURE — 84145 PROCALCITONIN (PCT): CPT

## 2021-04-11 PROCEDURE — 84443 ASSAY THYROID STIM HORMONE: CPT

## 2021-04-11 PROCEDURE — 87040 BLOOD CULTURE FOR BACTERIA: CPT

## 2021-04-11 PROCEDURE — 93005 ELECTROCARDIOGRAM TRACING: CPT | Performed by: STUDENT IN AN ORGANIZED HEALTH CARE EDUCATION/TRAINING PROGRAM

## 2021-04-11 PROCEDURE — 83735 ASSAY OF MAGNESIUM: CPT

## 2021-04-11 PROCEDURE — A9270 NON-COVERED ITEM OR SERVICE: HCPCS | Performed by: STUDENT IN AN ORGANIZED HEALTH CARE EDUCATION/TRAINING PROGRAM

## 2021-04-11 PROCEDURE — 770006 HCHG ROOM/CARE - MED/SURG/GYN SEMI*

## 2021-04-11 PROCEDURE — 83605 ASSAY OF LACTIC ACID: CPT

## 2021-04-11 PROCEDURE — 99223 1ST HOSP IP/OBS HIGH 75: CPT | Performed by: STUDENT IN AN ORGANIZED HEALTH CARE EDUCATION/TRAINING PROGRAM

## 2021-04-11 PROCEDURE — 85610 PROTHROMBIN TIME: CPT

## 2021-04-11 PROCEDURE — 36415 COLL VENOUS BLD VENIPUNCTURE: CPT

## 2021-04-11 PROCEDURE — 700111 HCHG RX REV CODE 636 W/ 250 OVERRIDE (IP): Performed by: STUDENT IN AN ORGANIZED HEALTH CARE EDUCATION/TRAINING PROGRAM

## 2021-04-11 PROCEDURE — 85730 THROMBOPLASTIN TIME PARTIAL: CPT

## 2021-04-11 PROCEDURE — 700101 HCHG RX REV CODE 250: Performed by: STUDENT IN AN ORGANIZED HEALTH CARE EDUCATION/TRAINING PROGRAM

## 2021-04-11 RX ORDER — LORAZEPAM 1 MG/1
0.5 TABLET ORAL 2 TIMES DAILY PRN
COMMUNITY

## 2021-04-11 RX ORDER — LORAZEPAM 1 MG/1
0.5 TABLET ORAL EVERY 12 HOURS
Status: DISCONTINUED | OUTPATIENT
Start: 2021-04-11 | End: 2021-04-21 | Stop reason: HOSPADM

## 2021-04-11 RX ORDER — OMEPRAZOLE 20 MG/1
20 CAPSULE, DELAYED RELEASE ORAL DAILY
Status: DISCONTINUED | OUTPATIENT
Start: 2021-04-12 | End: 2021-04-21 | Stop reason: HOSPADM

## 2021-04-11 RX ORDER — LEVOTHYROXINE SODIUM 0.05 MG/1
100 TABLET ORAL
Status: DISCONTINUED | OUTPATIENT
Start: 2021-04-12 | End: 2021-04-21 | Stop reason: HOSPADM

## 2021-04-11 RX ORDER — LIDOCAINE AND PRILOCAINE 25; 25 MG/G; MG/G
CREAM TOPICAL PRN
Status: ON HOLD | COMMUNITY
End: 2021-06-16

## 2021-04-11 RX ORDER — ONDANSETRON 4 MG/1
4 TABLET, ORALLY DISINTEGRATING ORAL EVERY 4 HOURS PRN
Status: DISCONTINUED | OUTPATIENT
Start: 2021-04-11 | End: 2021-04-21 | Stop reason: HOSPADM

## 2021-04-11 RX ORDER — ONDANSETRON 2 MG/ML
4 INJECTION INTRAMUSCULAR; INTRAVENOUS EVERY 4 HOURS PRN
Status: DISCONTINUED | OUTPATIENT
Start: 2021-04-11 | End: 2021-04-21 | Stop reason: HOSPADM

## 2021-04-11 RX ORDER — MORPHINE SULFATE 4 MG/ML
2 INJECTION, SOLUTION INTRAMUSCULAR; INTRAVENOUS
Status: DISCONTINUED | OUTPATIENT
Start: 2021-04-11 | End: 2021-04-12

## 2021-04-11 RX ORDER — DEXTROSE MONOHYDRATE 25 G/50ML
50 INJECTION, SOLUTION INTRAVENOUS
Status: DISCONTINUED | OUTPATIENT
Start: 2021-04-11 | End: 2021-04-19

## 2021-04-11 RX ORDER — ACETAMINOPHEN 500 MG
500-1000 TABLET ORAL PRN
Status: ON HOLD | COMMUNITY
End: 2021-06-16

## 2021-04-11 RX ORDER — LABETALOL HYDROCHLORIDE 5 MG/ML
10 INJECTION, SOLUTION INTRAVENOUS EVERY 4 HOURS PRN
Status: DISCONTINUED | OUTPATIENT
Start: 2021-04-11 | End: 2021-04-21 | Stop reason: HOSPADM

## 2021-04-11 RX ADMIN — METRONIDAZOLE 500 MG: 500 INJECTION, SOLUTION INTRAVENOUS at 23:56

## 2021-04-11 RX ADMIN — LORAZEPAM 0.5 MG: 1 TABLET ORAL at 23:56

## 2021-04-11 ASSESSMENT — COGNITIVE AND FUNCTIONAL STATUS - GENERAL
STANDING UP FROM CHAIR USING ARMS: A LITTLE
SUGGESTED CMS G CODE MODIFIER DAILY ACTIVITY: CJ
MOVING TO AND FROM BED TO CHAIR: A LITTLE
WALKING IN HOSPITAL ROOM: A LITTLE
MOBILITY SCORE: 19
MOVING FROM LYING ON BACK TO SITTING ON SIDE OF FLAT BED: A LITTLE
CLIMB 3 TO 5 STEPS WITH RAILING: A LITTLE
HELP NEEDED FOR BATHING: A LITTLE
DAILY ACTIVITIY SCORE: 22
DRESSING REGULAR LOWER BODY CLOTHING: A LITTLE
SUGGESTED CMS G CODE MODIFIER MOBILITY: CK

## 2021-04-11 ASSESSMENT — PATIENT HEALTH QUESTIONNAIRE - PHQ9
1. LITTLE INTEREST OR PLEASURE IN DOING THINGS: NOT AT ALL
2. FEELING DOWN, DEPRESSED, IRRITABLE, OR HOPELESS: NOT AT ALL
SUM OF ALL RESPONSES TO PHQ9 QUESTIONS 1 AND 2: 0
1. LITTLE INTEREST OR PLEASURE IN DOING THINGS: NOT AT ALL
SUM OF ALL RESPONSES TO PHQ9 QUESTIONS 1 AND 2: 0
2. FEELING DOWN, DEPRESSED, IRRITABLE, OR HOPELESS: NOT AT ALL

## 2021-04-11 ASSESSMENT — LIFESTYLE VARIABLES
TOTAL SCORE: 0
HAVE YOU EVER FELT YOU SHOULD CUT DOWN ON YOUR DRINKING: NO
ALCOHOL_USE: NO
TOTAL SCORE: 0
HOW MANY TIMES IN THE PAST YEAR HAVE YOU HAD 5 OR MORE DRINKS IN A DAY: 0
DOES PATIENT WANT TO STOP DRINKING: NO
ALCOHOL_USE: NO
EVER FELT BAD OR GUILTY ABOUT YOUR DRINKING: NO
TOTAL SCORE: 0
ON A TYPICAL DAY WHEN YOU DRINK ALCOHOL HOW MANY DRINKS DO YOU HAVE: 0
EVER HAD A DRINK FIRST THING IN THE MORNING TO STEADY YOUR NERVES TO GET RID OF A HANGOVER: NO
CONSUMPTION TOTAL: NEGATIVE
HAVE PEOPLE ANNOYED YOU BY CRITICIZING YOUR DRINKING: NO
TOTAL SCORE: 0
AVERAGE NUMBER OF DAYS PER WEEK YOU HAVE A DRINK CONTAINING ALCOHOL: 0
HAVE YOU EVER FELT YOU SHOULD CUT DOWN ON YOUR DRINKING: NO
TOTAL SCORE: 0
ON A TYPICAL DAY WHEN YOU DRINK ALCOHOL HOW MANY DRINKS DO YOU HAVE: 0
HAVE PEOPLE ANNOYED YOU BY CRITICIZING YOUR DRINKING: NO
AVERAGE NUMBER OF DAYS PER WEEK YOU HAVE A DRINK CONTAINING ALCOHOL: 0
DOES PATIENT WANT TO STOP DRINKING: NO
CONSUMPTION TOTAL: NEGATIVE
HOW MANY TIMES IN THE PAST YEAR HAVE YOU HAD 5 OR MORE DRINKS IN A DAY: 0
TOTAL SCORE: 0
EVER HAD A DRINK FIRST THING IN THE MORNING TO STEADY YOUR NERVES TO GET RID OF A HANGOVER: NO
EVER FELT BAD OR GUILTY ABOUT YOUR DRINKING: NO

## 2021-04-11 ASSESSMENT — ENCOUNTER SYMPTOMS
PALPITATIONS: 0
NECK PAIN: 0
HEADACHES: 0
WHEEZING: 0
BLURRED VISION: 0
SHORTNESS OF BREATH: 0
DEPRESSION: 0
BLOOD IN STOOL: 0
CHILLS: 1
DOUBLE VISION: 0
WEAKNESS: 0
LOSS OF CONSCIOUSNESS: 0
FEVER: 1
ABDOMINAL PAIN: 1
INSOMNIA: 0
FOCAL WEAKNESS: 0
NAUSEA: 1
DIARRHEA: 0
CONSTIPATION: 1
VOMITING: 1
COUGH: 0
BACK PAIN: 0
HEARTBURN: 0
BRUISES/BLEEDS EASILY: 0

## 2021-04-11 ASSESSMENT — PAIN DESCRIPTION - PAIN TYPE: TYPE: ACUTE PAIN

## 2021-04-11 ASSESSMENT — FIBROSIS 4 INDEX: FIB4 SCORE: 2.28

## 2021-04-12 ENCOUNTER — HOSPITAL ENCOUNTER (OUTPATIENT)
Dept: RADIOLOGY | Facility: MEDICAL CENTER | Age: 56
End: 2021-04-12
Payer: COMMERCIAL

## 2021-04-12 PROBLEM — D64.9 NORMOCYTIC ANEMIA: Status: ACTIVE | Noted: 2021-04-12

## 2021-04-12 LAB
ALBUMIN SERPL BCP-MCNC: 3.1 G/DL (ref 3.2–4.9)
ALBUMIN/GLOB SERPL: 1.2 G/DL
ALP SERPL-CCNC: 111 U/L (ref 30–99)
ALT SERPL-CCNC: 34 U/L (ref 2–50)
ANION GAP SERPL CALC-SCNC: 11 MMOL/L (ref 7–16)
APPEARANCE UR: CLEAR
APTT PPP: 36.7 SEC (ref 24.7–36)
AST SERPL-CCNC: 21 U/L (ref 12–45)
BACTERIA #/AREA URNS HPF: ABNORMAL /HPF
BASOPHILS # BLD AUTO: 0.1 % (ref 0–1.8)
BASOPHILS # BLD: 0.01 K/UL (ref 0–0.12)
BILIRUB SERPL-MCNC: 1.4 MG/DL (ref 0.1–1.5)
BILIRUB UR QL STRIP.AUTO: NEGATIVE
BUN SERPL-MCNC: 14 MG/DL (ref 8–22)
CALCIUM SERPL-MCNC: 8.3 MG/DL (ref 8.5–10.5)
CHLORIDE SERPL-SCNC: 103 MMOL/L (ref 96–112)
CO2 SERPL-SCNC: 21 MMOL/L (ref 20–33)
COLOR UR: ABNORMAL
CREAT SERPL-MCNC: 0.96 MG/DL (ref 0.5–1.4)
EKG IMPRESSION: NORMAL
EOSINOPHIL # BLD AUTO: 0.03 K/UL (ref 0–0.51)
EOSINOPHIL NFR BLD: 0.4 % (ref 0–6.9)
EPI CELLS #/AREA URNS HPF: ABNORMAL /HPF
ERYTHROCYTE [DISTWIDTH] IN BLOOD BY AUTOMATED COUNT: 43.4 FL (ref 35.9–50)
GLOBULIN SER CALC-MCNC: 2.6 G/DL (ref 1.9–3.5)
GLUCOSE BLD-MCNC: 145 MG/DL (ref 65–99)
GLUCOSE BLD-MCNC: 171 MG/DL (ref 65–99)
GLUCOSE BLD-MCNC: 192 MG/DL (ref 65–99)
GLUCOSE BLD-MCNC: 218 MG/DL (ref 65–99)
GLUCOSE SERPL-MCNC: 220 MG/DL (ref 65–99)
GLUCOSE UR STRIP.AUTO-MCNC: 500 MG/DL
HCT VFR BLD AUTO: 35.3 % (ref 37–47)
HGB BLD-MCNC: 11.9 G/DL (ref 12–16)
HYALINE CASTS #/AREA URNS LPF: ABNORMAL /LPF
IMM GRANULOCYTES # BLD AUTO: 0.14 K/UL (ref 0–0.11)
IMM GRANULOCYTES NFR BLD AUTO: 1.7 % (ref 0–0.9)
INR PPP: 1.63 (ref 0.87–1.13)
KETONES UR STRIP.AUTO-MCNC: 40 MG/DL
LACTATE BLD-SCNC: 0.8 MMOL/L (ref 0.5–2)
LACTATE BLD-SCNC: 0.9 MMOL/L (ref 0.5–2)
LACTATE BLD-SCNC: 1 MMOL/L (ref 0.5–2)
LACTATE BLD-SCNC: 1.1 MMOL/L (ref 0.5–2)
LEUKOCYTE ESTERASE UR QL STRIP.AUTO: ABNORMAL
LYMPHOCYTES # BLD AUTO: 0.2 K/UL (ref 1–4.8)
LYMPHOCYTES NFR BLD: 2.4 % (ref 22–41)
MAGNESIUM SERPL-MCNC: 2 MG/DL (ref 1.5–2.5)
MCH RBC QN AUTO: 29.7 PG (ref 27–33)
MCHC RBC AUTO-ENTMCNC: 33.7 G/DL (ref 33.6–35)
MCV RBC AUTO: 88 FL (ref 81.4–97.8)
MICRO URNS: ABNORMAL
MONOCYTES # BLD AUTO: 0.53 K/UL (ref 0–0.85)
MONOCYTES NFR BLD AUTO: 6.4 % (ref 0–13.4)
NEUTROPHILS # BLD AUTO: 7.43 K/UL (ref 2–7.15)
NEUTROPHILS NFR BLD: 89 % (ref 44–72)
NITRITE UR QL STRIP.AUTO: NEGATIVE
NRBC # BLD AUTO: 0 K/UL
NRBC BLD-RTO: 0 /100 WBC
PH UR STRIP.AUTO: 5.5 [PH] (ref 5–8)
PLATELET # BLD AUTO: 89 K/UL (ref 164–446)
PMV BLD AUTO: 9 FL (ref 9–12.9)
POTASSIUM SERPL-SCNC: 3.7 MMOL/L (ref 3.6–5.5)
PROCALCITONIN SERPL-MCNC: 1.68 NG/ML
PROT SERPL-MCNC: 5.7 G/DL (ref 6–8.2)
PROT UR QL STRIP: 30 MG/DL
PROTHROMBIN TIME: 19.9 SEC (ref 12–14.6)
RBC # BLD AUTO: 4.01 M/UL (ref 4.2–5.4)
RBC # URNS HPF: ABNORMAL /HPF
RBC UR QL AUTO: NEGATIVE
SARS-COV+SARS-COV-2 AG RESP QL IA.RAPID: NOTDETECTED
SARS-COV-2 RNA RESP QL NAA+PROBE: NOTDETECTED
SODIUM SERPL-SCNC: 135 MMOL/L (ref 135–145)
SP GR UR STRIP.AUTO: 1.02
SPECIMEN SOURCE: NORMAL
SPECIMEN SOURCE: NORMAL
TSH SERPL DL<=0.005 MIU/L-ACNC: 4.66 UIU/ML (ref 0.38–5.33)
UROBILINOGEN UR STRIP.AUTO-MCNC: 2 MG/DL
WBC # BLD AUTO: 8.3 K/UL (ref 4.8–10.8)
WBC #/AREA URNS HPF: ABNORMAL /HPF

## 2021-04-12 PROCEDURE — 770006 HCHG ROOM/CARE - MED/SURG/GYN SEMI*

## 2021-04-12 PROCEDURE — 700105 HCHG RX REV CODE 258: Performed by: STUDENT IN AN ORGANIZED HEALTH CARE EDUCATION/TRAINING PROGRAM

## 2021-04-12 PROCEDURE — 700101 HCHG RX REV CODE 250: Performed by: STUDENT IN AN ORGANIZED HEALTH CARE EDUCATION/TRAINING PROGRAM

## 2021-04-12 PROCEDURE — 36415 COLL VENOUS BLD VENIPUNCTURE: CPT

## 2021-04-12 PROCEDURE — 87426 SARSCOV CORONAVIRUS AG IA: CPT

## 2021-04-12 PROCEDURE — 85025 COMPLETE CBC W/AUTO DIFF WBC: CPT

## 2021-04-12 PROCEDURE — 82962 GLUCOSE BLOOD TEST: CPT

## 2021-04-12 PROCEDURE — 83605 ASSAY OF LACTIC ACID: CPT | Mod: 91

## 2021-04-12 PROCEDURE — U0005 INFEC AGEN DETEC AMPLI PROBE: HCPCS

## 2021-04-12 PROCEDURE — 93010 ELECTROCARDIOGRAM REPORT: CPT | Performed by: INTERNAL MEDICINE

## 2021-04-12 PROCEDURE — 700111 HCHG RX REV CODE 636 W/ 250 OVERRIDE (IP): Performed by: STUDENT IN AN ORGANIZED HEALTH CARE EDUCATION/TRAINING PROGRAM

## 2021-04-12 PROCEDURE — 81001 URINALYSIS AUTO W/SCOPE: CPT

## 2021-04-12 PROCEDURE — 0WBH3ZX EXCISION OF RETROPERITONEUM, PERCUTANEOUS APPROACH, DIAGNOSTIC: ICD-10-PCS | Performed by: RADIOLOGY

## 2021-04-12 PROCEDURE — 700102 HCHG RX REV CODE 250 W/ 637 OVERRIDE(OP): Performed by: STUDENT IN AN ORGANIZED HEALTH CARE EDUCATION/TRAINING PROGRAM

## 2021-04-12 PROCEDURE — 80053 COMPREHEN METABOLIC PANEL: CPT

## 2021-04-12 PROCEDURE — A9270 NON-COVERED ITEM OR SERVICE: HCPCS | Performed by: STUDENT IN AN ORGANIZED HEALTH CARE EDUCATION/TRAINING PROGRAM

## 2021-04-12 PROCEDURE — 87040 BLOOD CULTURE FOR BACTERIA: CPT

## 2021-04-12 PROCEDURE — 99232 SBSQ HOSP IP/OBS MODERATE 35: CPT | Performed by: INTERNAL MEDICINE

## 2021-04-12 PROCEDURE — U0003 INFECTIOUS AGENT DETECTION BY NUCLEIC ACID (DNA OR RNA); SEVERE ACUTE RESPIRATORY SYNDROME CORONAVIRUS 2 (SARS-COV-2) (CORONAVIRUS DISEASE [COVID-19]), AMPLIFIED PROBE TECHNIQUE, MAKING USE OF HIGH THROUGHPUT TECHNOLOGIES AS DESCRIBED BY CMS-2020-01-R: HCPCS

## 2021-04-12 RX ORDER — HYDROMORPHONE HYDROCHLORIDE 1 MG/ML
0.5 INJECTION, SOLUTION INTRAMUSCULAR; INTRAVENOUS; SUBCUTANEOUS
Status: DISCONTINUED | OUTPATIENT
Start: 2021-04-12 | End: 2021-04-13

## 2021-04-12 RX ADMIN — INSULIN HUMAN 2 UNITS: 100 INJECTION, SOLUTION PARENTERAL at 00:13

## 2021-04-12 RX ADMIN — HYDROMORPHONE HYDROCHLORIDE 0.5 MG: 1 INJECTION, SOLUTION INTRAMUSCULAR; INTRAVENOUS; SUBCUTANEOUS at 05:35

## 2021-04-12 RX ADMIN — INSULIN HUMAN 1 UNITS: 100 INJECTION, SOLUTION PARENTERAL at 23:38

## 2021-04-12 RX ADMIN — HYDROMORPHONE HYDROCHLORIDE 0.5 MG: 1 INJECTION, SOLUTION INTRAMUSCULAR; INTRAVENOUS; SUBCUTANEOUS at 11:47

## 2021-04-12 RX ADMIN — METRONIDAZOLE 500 MG: 500 INJECTION, SOLUTION INTRAVENOUS at 21:46

## 2021-04-12 RX ADMIN — OMEPRAZOLE 20 MG: 20 CAPSULE, DELAYED RELEASE ORAL at 05:35

## 2021-04-12 RX ADMIN — LEVOTHYROXINE SODIUM 100 MCG: 0.05 TABLET ORAL at 05:35

## 2021-04-12 RX ADMIN — HYDROMORPHONE HYDROCHLORIDE 0.5 MG: 1 INJECTION, SOLUTION INTRAMUSCULAR; INTRAVENOUS; SUBCUTANEOUS at 02:31

## 2021-04-12 RX ADMIN — METRONIDAZOLE 500 MG: 500 INJECTION, SOLUTION INTRAVENOUS at 06:12

## 2021-04-12 RX ADMIN — HYDROMORPHONE HYDROCHLORIDE 0.5 MG: 1 INJECTION, SOLUTION INTRAMUSCULAR; INTRAVENOUS; SUBCUTANEOUS at 08:40

## 2021-04-12 RX ADMIN — CEFTRIAXONE SODIUM 1 G: 1 INJECTION, POWDER, FOR SOLUTION INTRAMUSCULAR; INTRAVENOUS at 05:31

## 2021-04-12 RX ADMIN — HYDROMORPHONE HYDROCHLORIDE 0.5 MG: 1 INJECTION, SOLUTION INTRAMUSCULAR; INTRAVENOUS; SUBCUTANEOUS at 21:46

## 2021-04-12 RX ADMIN — HYDROMORPHONE HYDROCHLORIDE 0.5 MG: 1 INJECTION, SOLUTION INTRAMUSCULAR; INTRAVENOUS; SUBCUTANEOUS at 15:36

## 2021-04-12 RX ADMIN — INSULIN HUMAN 1 UNITS: 100 INJECTION, SOLUTION PARENTERAL at 11:46

## 2021-04-12 RX ADMIN — METRONIDAZOLE 500 MG: 500 INJECTION, SOLUTION INTRAVENOUS at 14:29

## 2021-04-12 RX ADMIN — LORAZEPAM 0.5 MG: 1 TABLET ORAL at 17:03

## 2021-04-12 RX ADMIN — INSULIN HUMAN 1 UNITS: 100 INJECTION, SOLUTION PARENTERAL at 05:39

## 2021-04-12 ASSESSMENT — ENCOUNTER SYMPTOMS
BLURRED VISION: 0
CONSTIPATION: 1
NECK PAIN: 0
CHILLS: 1
SHORTNESS OF BREATH: 0
PALPITATIONS: 0
WHEEZING: 0
FEVER: 1
HEARTBURN: 0
BRUISES/BLEEDS EASILY: 0
VOMITING: 1
LOSS OF CONSCIOUSNESS: 0
WEAKNESS: 0
DOUBLE VISION: 0
DIARRHEA: 0
FOCAL WEAKNESS: 0
DEPRESSION: 0
BLOOD IN STOOL: 0
ABDOMINAL PAIN: 1
NAUSEA: 1
INSOMNIA: 0
COUGH: 0
BACK PAIN: 0
HEADACHES: 0

## 2021-04-12 ASSESSMENT — PAIN DESCRIPTION - PAIN TYPE
TYPE: ACUTE PAIN

## 2021-04-12 NOTE — H&P
Hospital Medicine History & Physical Note    Date of Service  2021    Primary Care Physician  FARIDEH Granados    Consultants  General surgery Dr. Job Haque    Code Status  Full Code    Chief Complaint  Fever/abdominal pain    History of Presenting Illness  56 y.o. female who presented 2021 with complaints of fever and lower quadrant abdominal pain for the past 3 days.  Patient was transferred from outside facility for further escalation of medical care.  She was noted to be febrile on admission with mild elevation of leukocytosis and CT abdomen pelvis was performed at transferring facility which revealed: An apparent mass lesion is noted extended above the uterus on the right.  One consideration might be an ovarian neoplasm this could possibly be related to the patient's history of lymphoma.  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  There is also a small calcification in the right kidney.  Dr. Ramos Carbone had consulted with Dr. Job Haque who had accepted patient for surgical evaluation in a.m.  Patient was instructed to be n.p.o. and anticoagulations held.  Patient was given analgesics, saline infusion and Rocephin injection in ED and subsequently transferred to [facility] for further escalation of medical care.      Patient has a known history of non-Hodgkin lymphoma and states she receives chemotherapy care with Dr. Sue.  She mentions she had a left lower extremity DVT 2 years ago when she found out about her diagnosis and has been on Xarelto since.  She complains of mild nausea and vomiting and lower quadrant abdominal pain rated as sharp nonradiating 10/10.  Describes alleviating factors as morphine/Dilaudid/analgesics and denies any provoking factors.    Vital signs on arrival reveal the followin.3, 95, 18, 129/62, 90% room air.  Patient was noted to  have fever of 100.7 at transferring facility.    Labs performed at outside facility revealed the following: Neutrophilic predominant leukocytosis with WBC count of 11.3, hemoglobin 13.5, hematocrit 38.6, platelet count 112.  CMP reveals mild hyponatremia of 133, creatinine 1.1, BUN 19, glucose level 267, bilirubin 1.6, lipase 38 and otherwise unremarkable.  Lactic acid measured at 0.9.  Urinalysis performed and reveals positive for bilirubin, trace leukocyte esterase and few bacteria without significant pyuria.    Patient in agreement with n.p.o. for surgical evaluation in a.m.  She agrees to full CODE STATUS at time of evaluation is alert and oriented x4.        Review of Systems  Review of Systems   Constitutional: Positive for chills and fever.   HENT: Negative for congestion.    Eyes: Negative for blurred vision and double vision.   Respiratory: Negative for cough, shortness of breath and wheezing.    Cardiovascular: Negative for chest pain, palpitations and leg swelling.   Gastrointestinal: Positive for abdominal pain, constipation, nausea and vomiting. Negative for blood in stool, diarrhea, heartburn and melena.   Genitourinary: Negative for dysuria and frequency.   Musculoskeletal: Negative for back pain and neck pain.   Skin: Negative for itching and rash.   Neurological: Negative for focal weakness, loss of consciousness, weakness and headaches.   Endo/Heme/Allergies: Negative for environmental allergies. Does not bruise/bleed easily.   Psychiatric/Behavioral: Negative for depression. The patient does not have insomnia.        Past Medical History   has a past medical history of Acid reflux, Anesthesia, Cancer ([facility]) (1997), Cancer ([facility]) (2014), Diabetes ([facility]), DVT (deep venous thrombosis) ([facility]) (08/15/2019), Heart burn, Personal history of venous thrombosis and embolism, Snoring, Tuberculosis, and Unspecified disorder of thyroid.    Surgical History   has a past surgical history that includes lymph node  excision (1997); [facility] cath, port-a-cath (1997); edelmira by laparoscopy (3/1/2012); ankle arthroscopy (12/11/2013); ankle ligament reconstruction (12/11/2013); and cath placement (Right, 10/24/2019).     Family History  family history includes Cancer in an other family member; Diabetes in an other family member.     Social History   reports that she has never smoked. She has never used smokeless tobacco. She reports that she does not drink alcohol and does not use drugs.    Allergies  Allergies   Allergen Reactions   • Iodine Hives     CT dye, has a reaction even if premedicated    • Compazine      agitation   • Morphine Vomiting   • Penicillins      Reaction unknown   • Tape Rash     Adhesive tape rxn - paper tape ok   • Vicodin [Hydrocodone-Acetaminophen] Itching       Medications  Prior to Admission Medications   Prescriptions Last Dose Informant Patient Reported? Taking?   LORazepam (ATIVAN) 1 MG Tab 4/9/2021 at 1800  Yes Yes   Sig: Take 0.5 mg by mouth every 12 hours. Indications: Feeling Anxious, Nausea and Vomiting caused by Cancer Chemotherapy   acetaminophen (TYLENOL) 500 MG Tab 4/9/2021 at 1400  Yes Yes   Sig: Take 500-1,000 mg by mouth as needed for Moderate Pain.   glipiZIDE (GLUCOTROL) 5 MG Tab 4/11/2020  Yes No   Sig: Take 5 mg by mouth every morning.   insulin lispro (HUMALOG) 100 UNIT/ML 4/9/2021 at unknown  Yes Yes   Sig: Inject  under the skin 3 times a day before meals. Indications: Type 2 Diabetes   levothyroxine (SYNTHROID) 100 MCG Tab 4/7/2021 at 0700  Yes No   Sig: Take 100 mcg by mouth Every morning on an empty stomach.   lidocaine-prilocaine (EMLA) 2.5-2.5 % Cream 2/11/2021 at unknown   Yes Yes   Sig: Apply  topically as needed. Indications: Anesthesia to a Specific Part of the Body   ondansetron (ZOFRAN ODT) 4 MG TABLET DISPERSIBLE 4/11/2021 at 1700  No No   Sig: Take 1 Tab by mouth every four hours as needed for Nausea (give PO if no IV route available).   pantoprazole (PROTONIX) 40 MG Tablet  Delayed Response 3/11/2021 at 1000  Yes No   Sig: Take 40 mg by mouth every day. Indications: New Rx - pt has not yet started   rivaroxaban (XARELTO) 20 MG Tab tablet 4/10/2021 at 1400  No No   Sig: Take 1 Tab by mouth with dinner.   senna-docusate (PERICOLACE OR SENOKOT S) 8.6-50 MG Tab unknown at unknown   No No   Sig: Take 2 Tabs by mouth 1 time daily as needed for Constipation.      Facility-Administered Medications: None       Physical Exam  Temp:  [37.4 °C (99.3 °F)] 37.4 °C (99.3 °F)  Pulse:  [95] 95  Resp:  [18] 18  BP: (129)/(62) 129/62  SpO2:  [98 %] 98 %    Physical Exam     General: No acute distress, pleasant cooperative, appears age as stated, weight within normal limits.  Head: Normocephalic atraumatic  Eyes: Pupils equal round reactive to light and accommodation bilaterally, Extraocular muscles intact bilaterally, no scleral icterus, normal conjunctiva  Mouth/throat: No oral lesions, moist, no pharyngeal exudates or tonsillar abscess appreciated  Neck: Supple, trachea midline, no cervical adenopathy, no JVD, no thyromegaly  Cardiac: Regular rate and rhythm, normal S1 and S2, no rubs, clicks, gallops, murmurs.  Bilateral radial pulses +2, bilateral dorsal pedal pulses +2, no clubbing, cyanosis or edema appreciated.  Respiratory: Clear to auscultation bilaterally, no rhonchi, wheezing, rales.  No labored breathing or respiratory distress appreciated.  Abdomen: Normal active bowel sounds x4 quadrants, tenderness palpation lower abdominal quadrants worse on the left, no rebound tenderness, no organomegaly appreciated, no palpable masses, no guarding, negative Rovsing sign, negative McBurney's point, negative Mcpherson sign.  Extremities: Range of motion within normal limits, moves all extremities, no injury, tenderness, deformity appreciated.  Neurologic: Alert and oriented x4.  Speech fluent without errors. Follows all commands, moves all 4 extremities, no tremors appreciated, no dysarthria or  apraxia.  Cranial Nerves: Intact  Psychiatric: Normal mood and affect congruent.  No signs of psychomotor agitation.  Dermatologic: No erythema, petechial hemorrhages or ecchymosis appreciated.       Laboratory:          No results for input(s): ALTSGPT, ASTSGOT, ALKPHOSPHAT, TBILIRUBIN, DBILIRUBIN, GAMMAGT, AMYLASE, LIPASE, ALB, PREALBUMIN, GLUCOSE in the last 72 hours.      No results for input(s): NTPROBNP in the last 72 hours.      No results for input(s): TROPONINT in the last 72 hours.    Imaging:  No orders to display     Imaging unable to be viewed at this time and currently being transferred from outside facility.    Twelve-lead EKG presurgical has been ordered and currently pending.    Assessment/Plan:  I anticipate this patient will require at least two midnights for appropriate medical management, necessitating inpatient admission.    * Sepsis (HCC)- (present on admission)  Assessment & Plan  This is Sepsis Present on admission  SIRS criteria identified on my evaluation include: Fever, with temperature greater than 101 deg F and Tachycardia, with heart rate greater than 90 BPM  Source is Intra-abdominal  Sepsis protocol initiated  Fluid resuscitation ordered per protocol  IV antibiotics as appropriate for source of sepsis  While organ dysfunction may be noted elsewhere in this problem list or in the chart, degree of organ dysfunction does not meet CMS criteria for severe sepsis  Procalcitonin ordered and pending.        Other intra-abdominal and pelvic swelling, mass and lump- (present on admission)  Assessment & Plan  Mass appreciated CT scan from outside facility.  Triage officer contacted general surgeon Job Haque who had accepted patient for surgical evaluation a.m.  Anticoagulations held, n.p.o. and analgesics ordered for pain control.  Antiemetics ordered for control of her nausea and vomiting.  Presurgical EKG ordered and pending  PT/INR/APTT ordered and pending    Diabetes mellitus with  hyperglycemia (HCC)- (present on admission)  Assessment & Plan  Hemoglobin A1c is 9.2 as of August 2019  We will repeat hemoglobin A1c at this time  Sliding scale insulin  Recommend follow-up with outpatient PCP/endocrinologist upon discharge    DVT (deep venous thrombosis) (HCC)- (present on admission)  Assessment & Plan  Patient has had DVT left lower extremity 2 years ago.  We will hold her anticoagulation at this time due to surgery in a.m.    Non-Hodgkin lymphoma (HCC)- (present on admission)  Assessment & Plan  Patient states that she follows with Dr. Sue at [facility].  Follow-up outpatient PCP and oncologist upon discharge.    Thrombocytopenia (HCC)- (present on admission)  Assessment & Plan  Mild to moderate and we will repeat with CBC in a.m.    Hypothyroidism- (present on admission)  Assessment & Plan  Continue Synthroid 100 mcg p.o. daily  TSH ordered and pending.

## 2021-04-12 NOTE — PROGRESS NOTES
2 RN Skin Check   With Ella RN      All skin intact     NC and PIV in place. Skin assessed under all devices.

## 2021-04-12 NOTE — PROGRESS NOTES
[facility] Medicine Daily Progress Note    Date of Service  4/12/2021    Chief Complaint  56 y.o. female admitted 4/11/2021 with abdominal pain     Hospital Course  This is a 57 y/o F with PMHX of lymphoma who was admitted on 4//11/21 after presenting to ER at the outside facility with right lower quadrant pain and fever for the past 3 days. CT abdomen pelvis was performed at transferring facility which revealed: An apparent mass lesion is noted extended above the uterus on the right.  One consideration might be an ovarian neoplasm this could possibly be related to the patient's history of lymphoma.   Patient was transferred  here for high level of care and also surgery was consulted.  Patient was seen by surgery who also recommended for oncology consult  So oncology was consulted     Interval Problem Update  Patient seen and examined, afebrile, has some nausea, seen by surgery but no need for intervention at this time.  Oncology has also been consulted and recommended for core biopsy of the pelvic mass.  Appreciate rec.     Consultants/Specialty  Surgery: Dr. Zapata  Oncology: Dr. Sue     Code Status  Full Code    Disposition  TBD     Review of Systems  Review of Systems   Constitutional: Positive for chills and fever.   HENT: Negative for congestion.    Eyes: Negative for blurred vision and double vision.   Respiratory: Negative for cough, shortness of breath and wheezing.    Cardiovascular: Negative for chest pain, palpitations and leg swelling.   Gastrointestinal: Positive for abdominal pain, constipation, nausea and vomiting. Negative for blood in stool, diarrhea, heartburn and melena.   Genitourinary: Negative for dysuria and frequency.   Musculoskeletal: Negative for back pain and neck pain.   Skin: Negative for itching and rash.   Neurological: Negative for focal weakness, loss of consciousness, weakness and headaches.   Endo/Heme/Allergies: Negative for environmental allergies. Does not bruise/bleed  easily.   Psychiatric/Behavioral: Negative for depression. The patient does not have insomnia.    All other systems reviewed and are negative.       Physical Exam  Temp:  [36.9 °C (98.5 °F)-37.7 °C (99.9 °F)] 36.9 °C (98.5 °F)  Pulse:  [89-97] 94  Resp:  [16-18] 17  BP: (107-129)/(61-71) 110/71  SpO2:  [95 %-99 %] 99 %    Physical Exam  Vitals and nursing note reviewed.   HENT:      Head: Normocephalic and atraumatic.   Eyes:      General: No scleral icterus.        Right eye: No discharge.         Left eye: No discharge.   Cardiovascular:      Rate and Rhythm: Normal rate.      Heart sounds: No gallop.    Pulmonary:      Effort: No respiratory distress.      Breath sounds: No wheezing.   Abdominal:      Palpations: Abdomen is soft.      Tenderness: There is abdominal tenderness. There is no guarding or rebound.   Skin:     General: Skin is warm.      Findings: No erythema.   Neurological:      General: No focal deficit present.      Mental Status: She is alert. Mental status is at baseline.   Psychiatric:         Mood and Affect: Mood normal.         Thought Content: Thought content normal.         Fluids    Intake/Output Summary (Last 24 hours) at 4/12/2021 1221  Last data filed at 4/12/2021 1130  Gross per 24 hour   Intake 340 ml   Output --   Net 340 ml       Laboratory  Recent Labs     04/12/21  0342   WBC 8.3   RBC 4.01*   HEMOGLOBIN 11.9*   HEMATOCRIT 35.3*   MCV 88.0   MCH 29.7   MCHC 33.7   RDW 43.4   PLATELETCT 89*   MPV 9.0     Recent Labs     04/12/21  0342   SODIUM 135   POTASSIUM 3.7   CHLORIDE 103   CO2 21   GLUCOSE 220*   BUN 14   CREATININE 0.96   CALCIUM 8.3*     Recent Labs     04/11/21  2344   APTT 36.7*   INR 1.63*               Imaging  OUTSIDE IMAGES-CT ABDOMEN /PELVIS   Final Result      OUTSIDE IMAGES-DX CHEST   Final Result      IR-CONSULT AND TREAT    (Results Pending)        Assessment/Plan  * Sepsis (HCC)- (present on admission)  Assessment & Plan  This is Sepsis Present on  admission  SIRS criteria identified on my evaluation include: Fever, with temperature greater than 101 deg F and Tachycardia, with heart rate greater than 90 BPM  Source is Intra-abdominal  Sepsis protocol initiated  Fluid resuscitation ordered per protocol  IV antibiotics as appropriate for source of sepsis  While organ dysfunction may be noted elsewhere in this problem list or in the chart, degree of organ dysfunction does not meet CMS criteria for severe sepsis  Monitor       Other intra-abdominal and pelvic swelling, mass and lump- (present on admission)  Assessment & Plan  Mass appreciated CT scan from outside facility.  Triage officer contacted general surgeon Job Haque who had accepted patient for surgical evaluation a.m.  Anticoagulations held, n.p.o. and analgesics ordered for pain control.  Antiemetics ordered for control of her nausea and vomiting.  Patient was seen by Dr. Zapata today: no intraabdominal condition warranting emergency surgery at this time.    I have also consulted also Dr Sue oncology   Oncology recommending IR core biopsy of the mass which has been ordered     Diabetes mellitus with hyperglycemia (HCC)- (present on admission)  Assessment & Plan  Hemoglobin A1c is 9.2 as of August 2019  We will repeat hemoglobin A1c at this time  Sliding scale insulin  Recommend follow-up with outpatient PCP/endocrinologist upon discharge    DVT (deep venous thrombosis) (HCC)- (present on admission)  Assessment & Plan  Patient has had DVT left lower extremity 2 years ago.  We will hold her anticoagulation at this time due to possible core biopsy     Normocytic anemia  Assessment & Plan  Hemoglobin 11.9  Will check iron panel  Monitor     Non-Hodgkin lymphoma (HCC)- (present on admission)  Assessment & Plan  Patient states that she follows with Dr. Sue at [facility].  Oncology Dr. Sue has been consulted and recommended core biopsy of pelvic mass which has been ordered  Appreciate rec.      Thrombocytopenia (HCC)- (present on admission)  Assessment & Plan  Mild to moderate and we will repeat with CBC in a.m.    Hypothyroidism- (present on admission)  Assessment & Plan  Continue Synthroid 100 mcg p.o. daily  TSH ordered and pending.       VTE prophylaxis: scd

## 2021-04-12 NOTE — CARE PLAN
Problem: Communication  Goal: The ability to communicate needs accurately and effectively will improve  Outcome: PROGRESSING AS EXPECTED  Note: Pt utilizes call light when assistance is needed.      Problem: Pain Management  Goal: Pain level will decrease to patient's comfort goal  Outcome: PROGRESSING AS EXPECTED  Note: Pt educated on 1-10 pain scale. Pt able to communicate pain appropriately to healthcare team. Medicated per MAR.

## 2021-04-12 NOTE — ASSESSMENT & PLAN NOTE
IV Cefepime only now, remains afebrile, symptomatic improvement noted  CT-guided right retroperitoneal biopsy 4/14/2021 - Several small fibrous course with areas of necrosis and area of acute inflammation. No lymph node tissue identified. No unequivocal malignancy identified.     For MRI abdomen and pelvis with contrast, finished today, awaiting final read  -?suspect superimposed infection, still no clear etiology at this time

## 2021-04-12 NOTE — CONSULTS
CONSULTATION WAS CALLED BY:  Chong Herr MD     REASON FOR CONSULTATION:  1.  Abdominal pain.  2.  Pelvic mass, likely related to relapsed non-Hodgkin's lymphoma.  3.  DVT.  4.  History of diabetes.     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  The patient   also has a history of left lower extremity DVT diagnosed in 08/2019 and is   heterozygous for factor V Leiden.  The patient's non-Hodgkin's lymphoma was   diagnosed in 2014 when she had left neck adenopathy and the biopsy showed the   above-mentioned diagnosis.  She was treated with Treanda and rituximab x6 from   12/2014 to 05/2015 resulting in a CR.  She refused any maintenance rituximab   and was observed.  In August of 2019, she had a left lower extremity DVT and   was started on Xarelto.  A PET scan performed on 09/18/2019 showed recurrent   lymphoma.  Biopsy from the right retroperitoneal lymph node biopsy did not   show any evidence of transformation, but showed relapsed disease.  Treatment   with Gazyva and bendamustine was started on 06/11/2020. PET scan after four   cycles of chemotherapy showed that the retroperitoneal mass was cystic and   consistent with disease response.  Six cycles of the above-mentioned   chemotherapy were completed and she was put on maintenance Gazyva.  She   received her second cycle of maintenance Gazyva on 02/23/2021.  The patient   was due for a PET scan today in the office, but was admitted to the hospital   with abdominal pain.  A CT scan done at her local hospital showed that the   right pelvic mass was larger than before.  The patient has already been seen   by surgery and is not a candidate for surgery.  She has significant pelvic   pain.  She denies any weight loss; as per her, her diabetes is fairly   well-controlled at this time.     PAST  MEDICAL HISTORY:  Diabetes, DVT, non-Hodgkin's lymphoma, and   hypothyroidism.     PAST SURGICAL HISTORY:  Cholecystectomy, bone marrow biopsy, surgical biopsy,   right ankle surgery.     PERSONAL AND SOCIAL HISTORY:  , lives with her spouse.  Previously   worked as a  at a school and a .     REVIEW OF SYSTEMS:  GENERAL AND CONSTITUTIONAL:  Denies any night sweats or any fevers.  HEAD AND NECK, EAR, NOSE, AND THROAT:  Denies any headaches or any visual   symptoms.  RESPIRATORY:  Denies any cough or hemoptysis.  No chest pain or palpitations.    No cough or shortness of breath.  CARDIOVASCULAR:  No orthopnea, PND, or chest pain.  GASTROINTESTINAL:  See history of present illness.  GENITOURINARY:  Denies any dysuria or hematuria.  NEUROLOGICAL:  Denies any seizures or stroke.  PSYCHIATRIC:  Anxious, but denies any depression or being suicidal.  HEMATOLOGICAL AND IMMUNOLOGICAL:   See history of present illness.  ENDOCRINE:  Has diabetes and hypothyroidism and takes her treatments   regularly.  SKIN AND INTEGUMENTARY:  No rash or any bruising.     PHYSICAL EXAMINATION:  GENERAL:  On examination, the patient is alert and oriented x3, lying in bed   in minimal distress because of her abdominal pain.  HEENT:  Oral mucosa is unremarkable.  There is no mucositis.  NECK:  Supple with no JVD or lymphadenopathy.  LUNGS: Clear to auscultation with good bilateral air entry.  HEART:  Reveals no S3, S4.  ABDOMEN:  Reveals tenderness in the lower abdomen.  There is no rebound.    Bowel sounds are normally heard.  EXTREMITIES:  There is trace left lower extremity edema and no calf   tenderness.  SKIN: Reveals no rash or bruising, but shows chronic changes in her left lower   extremity.     LABORATORY DATA:  Reviewed.     RADIOLOGICAL STUDIES:  Reviewed.     ASSESSMENT AND PLAN:  1.  Follicular non-Hodgkin's lymphoma.  The patient's disease appears to be   relapsing since the pelvic mass is larger than what  it was previously.  Hence,   plan would be to get a core biopsy from this mass.  I have discussed the case   with Dr. Herr, who agrees with my plan.  I have also reviewed the patient's   surgical consultation notes.  2.  Abdominal pain.  Abdominal examination shows some pelvic pain.  Otherwise,   there is no rebound tenderness.  There is nothing to suggest any acute   obstruction.  She will continue her present pain medications.  3.  DVT.  She is currently on anticoagulation.  We will have to be held for   her upcoming biopsy, which will be arranged by Dr. Herr.  4.  Diabetes.  This remains well-controlled.  5.  Hypothyroidism.  This also remains well-controlled.  I will continue to   follow up the patient.  I discussed management plan with the patient and her    and they agree with it.        ______________________________  MD MARK Peterson/JOSE ROBERTO    DD:  04/12/2021 13:36  DT:  04/12/2021 16:05    Job#:  [id number]

## 2021-04-12 NOTE — CONSULTS
Surgical History and Physical    Date of Service: 4/12/2021    Requesting Physician: Chong Herr MD - Medicine    Reason for Consultation: Abdominal pain, mass    HPI: This is a 56 y.o. female who is presenting from [facility] with 3 days of worsening generalized abdominal pain.  This was associated with bouts of diarrhea and nausea, but no emesis.  She was also having fevers.  She underwent CT imaging which redemonstrated a pelvic mass.  She has a history of NHL for which she is on maintenance chemotherapy managed by Dr. Sue.  The mass appears larger and more heterogenous compared to a CT from 5/15/2020.  She was admitted to the medical service and started on antibiotics.    PAST MEDICAL HISTORY:   Past Medical History:   Diagnosis Date   • Acid reflux    • Anesthesia     post op n/v   • Cancer (HCC) 1997    Hodgkins Lymphoma   • Cancer (HCC) 2014    non-hodgkins lymphoma   • Diabetes (HCC)     oral meds   • DVT (deep venous thrombosis) ([facility]) 08/15/2019    on Xarelto   • Heart burn     has new Rx for pantoprazole - has not yet started   • Personal history of venous thrombosis and embolism     arm, due to IV from  chemo   • Snoring    • Tuberculosis     skin test (+), cxr (-)   • Unspecified disorder of thyroid     hypothyroid         PAST SURGICAL HISTORY:   Past Surgical History:   Procedure Laterality Date   • CATH PLACEMENT Right 10/24/2019    Procedure: INSERTION, CATHETER- PORT A CATH;  Surgeon: Jose Brewster M.D.;  Location: SURGERY SAME DAY [facility];  Service: General   • ANKLE ARTHROSCOPY  12/11/2013    Performed by Yao Del Cid M.D. at SURGERY [facility] ORS   • ANKLE LIGAMENT RECONSTRUCTION  12/11/2013    Performed by Yao Del Cid M.D. at SURGERY [facility] ORS   • RAUL BY LAPAROSCOPY  3/1/2012    Performed by KELLY QUINTERO at SURGERY [facility]   • LYMPH NODE EXCISION  1997    neck   • CATH, PORT-A-CATH  1997          ALLERGIES: Iodine, Compazine, Morphine, Penicillins,  Tape, and Vicodin [hydrocodone-acetaminophen]       CURRENT MEDICATIONS:   Outpatient Medications Marked as Taking for the 4/11/21 encounter (Hospital Encounter)   Medication Sig   • acetaminophen (TYLENOL) 500 MG Tab Take 500-1,000 mg by mouth as needed for Moderate Pain.   • LORazepam (ATIVAN) 1 MG Tab Take 0.5 mg by mouth every 12 hours. Indications: Feeling Anxious, Nausea and Vomiting caused by Cancer Chemotherapy   • insulin lispro (HUMALOG) 100 UNIT/ML Inject  under the skin 3 times a day before meals. Indications: Type 2 Diabetes   • lidocaine-prilocaine (EMLA) 2.5-2.5 % Cream Apply  topically as needed. Indications: Anesthesia to a Specific Part of the Body         FAMILY HISTORY: family history includes Cancer in an other family member; Diabetes in an other family member.      SOCIAL HISTORY:  reports that she has never smoked. She has never used smokeless tobacco. She reports that she does not drink alcohol and does not use drugs.      Review of Systems:  Constitutional: Negative for fever, chills, weight loss, malaise/fatigue and diaphoresis.   HENT: Negative for hearing loss, ear pain, nosebleeds, congestion, sore throat, neck pain, tinnitus and ear discharge.    Eyes: Negative for blurred vision, double vision, photophobia, pain, discharge and redness.   Respiratory: Negative for cough, hemoptysis, sputum production, shortness of breath, wheezing and stridor.    Cardiovascular: Negative for chest pain, palpitations, orthopnea, claudication, leg swelling and PND.   Gastrointestinal: Negative for heartburn, nausea, vomiting, abdominal pain, diarrhea, constipation, blood in stool and melena.   Genitourinary: Negative for dysuria, urgency, frequency, hematuria and flank pain.   Musculoskeletal: Negative for myalgias, back pain, joint pain and falls.   Skin: Negative for itching and rash.  Neurological: Negative for dizziness, tingling, tremors, sensory change, speech change, focal weakness, seizures, loss  "of consciousness, weakness and headaches.   Endo/Heme/Allergies: Negative for environmental allergies and polydipsia. Does not bruise/bleed easily.   Psychiatric/Behavioral: Negative for depression, suicidal ideas, hallucinations, memory loss and substance abuse. The patient is not nervous/anxious and does not have insomnia.    Physical Exam:  /71   Pulse 94   Temp 36.9 °C (98.5 °F) (Temporal)   Resp 17   Ht 1.67 m (5' 5.75\")   Wt 76.9 kg (169 lb 8.5 oz)   SpO2 99%   Vitals:    04/12/21 0735   BP: 110/71   Pulse: 94   Resp: 17   Temp: 36.9 °C (98.5 °F)   SpO2: 99%     GENERAL:  Otherwise healthy-appearing and in no acute distress  HEENT:  Atraumatic, normocephalic.  Normal pinna bilaterally.  External auditory canals are without discharge.  Conjunctivae and sclerae are clear. Extraocular movements are full. Pupils are equal, round, and reactive to light.  Oral mucosa is moist.  NECK:  Soft and supple without lymphadenopathy. No masses are noted.  Thyroid is of normal size and texture.  Trachea is midline.  CHEST:  Lungs are clear to auscultation bilaterally.  No masses, lesions, or signs of trauma were noted.     CARDIOVASCULAR:  Regular rate and rhythm.  No murmurs appreciated.  No JVD.  Palpable pulses present in all four extremities.   ABDOMEN:  Soft, tender x4 quadrants but more so towards the lower quadrants, non-distended.  Non-tympanitic.  No hepatomegaly or splenomegaly.    MUSCULOSKELETAL: Normal range of motion x4 extremities.    SKIN:  Warm and well perfused. No rashes.  NEUROLOGIC:  Alert and oriented. Cranial nerves II through XII are grossly intact. Motor and sensory exams are normal in all four extremities. Motor and sensory reflexes are 2+ and symmetric with bilateral plantar responses.  PSYCHIATRIC: Affect and mood is appropriate for age and condition.    Labs:  Recent Labs     04/12/21  0342   WBC 8.3   RBC 4.01*   HEMOGLOBIN 11.9*   HEMATOCRIT 35.3*   MCV 88.0   MCH 29.7   MCHC 33.7 "   RDW 43.4   PLATELETCT 89*   MPV 9.0     Recent Labs     04/12/21  0342   SODIUM 135   POTASSIUM 3.7   CHLORIDE 103   CO2 21   GLUCOSE 220*   BUN 14   CREATININE 0.96   CALCIUM 8.3*     Recent Labs     04/11/21  2344   APTT 36.7*   INR 1.63*     Recent Labs     04/11/21  2344 04/12/21  0342   ASTSGOT  --  21   ALTSGPT  --  34   TBILIRUBIN  --  1.4   ALKPHOSPHAT  --  111*   GLOBULIN  --  2.6   INR 1.63*  --        Radiology:  OUTSIDE IMAGES-CT ABDOMEN /PELVIS   Final Result      OUTSIDE IMAGES-DX CHEST   Final Result        Imaging with reviewed with radiology here with comparison to the 5/15/2020 CT.  The above findings regarding the pelvic mass were noted.  Additionally, there is evidence of fat stranding and thickening of the bladder wall perhaps suggesting a UTI.      Assessment/Plan:   1) Abdominal pain, mass:  Her presentation is related to her enlarging pelvic mass and presumed UTI/cystitis.  She is already on antibiotics.  I would recommend reaching out to Dr. Sue for further guidance on the pelvic mass.  There is no intraabdominal condition warranting emergency surgery at this time.      -Dicussed with Dr. Herr  -Signing off  ____________________________________   Allan Zapata MD, FACS     JRU / NTS     DD: 4/12/2021   DT: 8:36 AM

## 2021-04-12 NOTE — PROGRESS NOTES
"Bedside report received.  Assessment complete.  A&O x 4. Patient calls appropriately.  Patient ambulates with standby assist. Bed alarm off.   Patient has 8/10 pain. Pain managed with prescribed medications.  Denies N&V. NPO diet.  + void, + flatus, last BM PTA.  Patient denies SOB.  SCD's off.  Patient is calm and cooperative with care plan.  Review plan with of care with patient. Call light and personal belongings with in reach. Hourly rounding in place. All needs met at this time.  /71   Pulse 94   Temp 36.9 °C (98.5 °F) (Temporal)   Resp 17   Ht 1.67 m (5' 5.75\")   Wt 76.9 kg (169 lb 8.5 oz)   LMP 11/01/2013   SpO2 99%   BMI 27.57 kg/m²     "

## 2021-04-12 NOTE — PROGRESS NOTES
[facility] TRIAGE OFFICER DIRECT ADMISSION REPORT  Transferring facility: [facility]   Transferring physician: Dr Montana   Chief complaint: shortness of breath   Pertinent history & patient course: 55 yo female with history of non hodgkin lymphoma who presents to the hospital with abd pain and fever. Febrile to 38.2F leukocytosis. On current chemo for non hodgkin lymphoma. Has RLQ abd mass which is known from previous on CT scan 5/15/2020. She has RLQ abd pain Surgeon there does not visualize appendix. Dr. Haque surgery consulted here. Given IV abx.   Pertinent imaging & lab results: CT from OSH with large RLQ abd mass no visualized appendix. Febrile, leukocytosis.   Code Status: full per transferring provider, I personally verified with the transferring provider patient's code status and the transferring provider has confirmed this with the patient.  Further work up or recommendations per triage officer prior to transfer: none  Consultants called prior to transfer and pertinent input from consultants: Dr. Haque  Patient accepted for transfer: Yes  Consultants to be called upon arrival: Dr Haque will see in am do not call tonight.  Admission status: Inpatient.   Floor requested: Surgery  If ICU transfer, name of intensivist case discussed with and pertinent input from critical care: none    Please inform the triage officer upon arrival of the patient to [facility] for assignment of a hospitalist to perform admission.     For any question or concerns regarding the care of this patient, please reach out to the assigned hospitalist.

## 2021-04-12 NOTE — PROGRESS NOTES
Pt transported to unit via [facility]. Pt ambulated to bed. Belongings at bedside.     Report received.  Assessment complete.  A&O x 4. Patient calls appropriately.  Patient ambulates with SBA.   Patient has 9/10 pain. Pain managed with prescribed medications.  Denies N&V. NPO with sips.  + void, + flatus, last BM PTA.  Patient denies SOB  .  Review plan with of care with patient. Call light and personal belongings with in reach. Hourly rounding in place. All needs met at this time.

## 2021-04-13 PROBLEM — E87.6 HYPOKALEMIA: Status: ACTIVE | Noted: 2021-04-13

## 2021-04-13 PROBLEM — Z86.718 HISTORY OF DVT (DEEP VEIN THROMBOSIS): Status: ACTIVE | Noted: 2019-08-11

## 2021-04-13 PROBLEM — Z79.4 TYPE 2 DIABETES MELLITUS WITH HYPERGLYCEMIA, WITH LONG-TERM CURRENT USE OF INSULIN (HCC): Status: ACTIVE | Noted: 2019-08-11

## 2021-04-13 LAB
ANION GAP SERPL CALC-SCNC: 10 MMOL/L (ref 7–16)
BUN SERPL-MCNC: 11 MG/DL (ref 8–22)
CALCIUM SERPL-MCNC: 8.4 MG/DL (ref 8.5–10.5)
CHLORIDE SERPL-SCNC: 102 MMOL/L (ref 96–112)
CO2 SERPL-SCNC: 22 MMOL/L (ref 20–33)
CREAT SERPL-MCNC: 0.96 MG/DL (ref 0.5–1.4)
ERYTHROCYTE [DISTWIDTH] IN BLOOD BY AUTOMATED COUNT: 43.3 FL (ref 35.9–50)
GLUCOSE BLD-MCNC: 129 MG/DL (ref 65–99)
GLUCOSE BLD-MCNC: 136 MG/DL (ref 65–99)
GLUCOSE BLD-MCNC: 150 MG/DL (ref 65–99)
GLUCOSE BLD-MCNC: 176 MG/DL (ref 65–99)
GLUCOSE SERPL-MCNC: 169 MG/DL (ref 65–99)
HCT VFR BLD AUTO: 33.3 % (ref 37–47)
HGB BLD-MCNC: 11.1 G/DL (ref 12–16)
MCH RBC QN AUTO: 29.4 PG (ref 27–33)
MCHC RBC AUTO-ENTMCNC: 33.3 G/DL (ref 33.6–35)
MCV RBC AUTO: 88.1 FL (ref 81.4–97.8)
PLATELET # BLD AUTO: 111 K/UL (ref 164–446)
PMV BLD AUTO: 9.6 FL (ref 9–12.9)
POTASSIUM SERPL-SCNC: 3.3 MMOL/L (ref 3.6–5.5)
RBC # BLD AUTO: 3.78 M/UL (ref 4.2–5.4)
SODIUM SERPL-SCNC: 134 MMOL/L (ref 135–145)
WBC # BLD AUTO: 6.9 K/UL (ref 4.8–10.8)

## 2021-04-13 PROCEDURE — 770006 HCHG ROOM/CARE - MED/SURG/GYN SEMI*

## 2021-04-13 PROCEDURE — 85027 COMPLETE CBC AUTOMATED: CPT

## 2021-04-13 PROCEDURE — 700101 HCHG RX REV CODE 250: Performed by: STUDENT IN AN ORGANIZED HEALTH CARE EDUCATION/TRAINING PROGRAM

## 2021-04-13 PROCEDURE — 99233 SBSQ HOSP IP/OBS HIGH 50: CPT | Performed by: FAMILY MEDICINE

## 2021-04-13 PROCEDURE — 94760 N-INVAS EAR/PLS OXIMETRY 1: CPT

## 2021-04-13 PROCEDURE — 700105 HCHG RX REV CODE 258: Performed by: STUDENT IN AN ORGANIZED HEALTH CARE EDUCATION/TRAINING PROGRAM

## 2021-04-13 PROCEDURE — A9270 NON-COVERED ITEM OR SERVICE: HCPCS | Performed by: FAMILY MEDICINE

## 2021-04-13 PROCEDURE — 82962 GLUCOSE BLOOD TEST: CPT

## 2021-04-13 PROCEDURE — 36415 COLL VENOUS BLD VENIPUNCTURE: CPT

## 2021-04-13 PROCEDURE — 700111 HCHG RX REV CODE 636 W/ 250 OVERRIDE (IP): Performed by: STUDENT IN AN ORGANIZED HEALTH CARE EDUCATION/TRAINING PROGRAM

## 2021-04-13 PROCEDURE — A9270 NON-COVERED ITEM OR SERVICE: HCPCS | Performed by: STUDENT IN AN ORGANIZED HEALTH CARE EDUCATION/TRAINING PROGRAM

## 2021-04-13 PROCEDURE — 700102 HCHG RX REV CODE 250 W/ 637 OVERRIDE(OP): Performed by: STUDENT IN AN ORGANIZED HEALTH CARE EDUCATION/TRAINING PROGRAM

## 2021-04-13 PROCEDURE — 80048 BASIC METABOLIC PNL TOTAL CA: CPT

## 2021-04-13 PROCEDURE — 700102 HCHG RX REV CODE 250 W/ 637 OVERRIDE(OP): Performed by: FAMILY MEDICINE

## 2021-04-13 RX ORDER — HYDROMORPHONE HYDROCHLORIDE 1 MG/ML
1 INJECTION, SOLUTION INTRAMUSCULAR; INTRAVENOUS; SUBCUTANEOUS
Status: DISCONTINUED | OUTPATIENT
Start: 2021-04-13 | End: 2021-04-21 | Stop reason: HOSPADM

## 2021-04-13 RX ORDER — OXYCODONE HYDROCHLORIDE 5 MG/1
5-10 TABLET ORAL EVERY 4 HOURS PRN
Status: DISCONTINUED | OUTPATIENT
Start: 2021-04-13 | End: 2021-04-21 | Stop reason: HOSPADM

## 2021-04-13 RX ORDER — POTASSIUM CHLORIDE 20 MEQ/1
20 TABLET, EXTENDED RELEASE ORAL DAILY
Status: DISCONTINUED | OUTPATIENT
Start: 2021-04-13 | End: 2021-04-17

## 2021-04-13 RX ADMIN — ONDANSETRON 4 MG: 2 INJECTION INTRAMUSCULAR; INTRAVENOUS at 11:53

## 2021-04-13 RX ADMIN — POTASSIUM CHLORIDE 20 MEQ: 1500 TABLET, EXTENDED RELEASE ORAL at 07:59

## 2021-04-13 RX ADMIN — OXYCODONE 5 MG: 5 TABLET ORAL at 11:53

## 2021-04-13 RX ADMIN — LEVOTHYROXINE SODIUM 100 MCG: 0.05 TABLET ORAL at 06:06

## 2021-04-13 RX ADMIN — HYDROMORPHONE HYDROCHLORIDE 0.5 MG: 1 INJECTION, SOLUTION INTRAMUSCULAR; INTRAVENOUS; SUBCUTANEOUS at 07:12

## 2021-04-13 RX ADMIN — METRONIDAZOLE 500 MG: 500 INJECTION, SOLUTION INTRAVENOUS at 21:39

## 2021-04-13 RX ADMIN — OXYCODONE 10 MG: 5 TABLET ORAL at 16:05

## 2021-04-13 RX ADMIN — OMEPRAZOLE 20 MG: 20 CAPSULE, DELAYED RELEASE ORAL at 06:06

## 2021-04-13 RX ADMIN — CEFTRIAXONE SODIUM 1 G: 1 INJECTION, POWDER, FOR SOLUTION INTRAMUSCULAR; INTRAVENOUS at 06:08

## 2021-04-13 RX ADMIN — OXYCODONE 10 MG: 5 TABLET ORAL at 21:42

## 2021-04-13 RX ADMIN — LORAZEPAM 0.5 MG: 1 TABLET ORAL at 17:15

## 2021-04-13 RX ADMIN — LORAZEPAM 0.5 MG: 1 TABLET ORAL at 06:07

## 2021-04-13 RX ADMIN — METRONIDAZOLE 500 MG: 500 INJECTION, SOLUTION INTRAVENOUS at 14:13

## 2021-04-13 RX ADMIN — METRONIDAZOLE 500 MG: 500 INJECTION, SOLUTION INTRAVENOUS at 07:09

## 2021-04-13 RX ADMIN — HYDROMORPHONE HYDROCHLORIDE 0.5 MG: 1 INJECTION, SOLUTION INTRAMUSCULAR; INTRAVENOUS; SUBCUTANEOUS at 02:40

## 2021-04-13 ASSESSMENT — ENCOUNTER SYMPTOMS
SORE THROAT: 0
DOUBLE VISION: 0
SPEECH CHANGE: 0
BRUISES/BLEEDS EASILY: 0
PALPITATIONS: 0
NECK PAIN: 0
SENSORY CHANGE: 0
ORTHOPNEA: 0
WHEEZING: 0
DIAPHORESIS: 0
HEADACHES: 0
MYALGIAS: 0
FEVER: 0
ABDOMINAL PAIN: 1
CHILLS: 0
COUGH: 0
FOCAL WEAKNESS: 0
VOMITING: 0
HEARTBURN: 0
DIZZINESS: 0
BLURRED VISION: 0
BACK PAIN: 0
DIARRHEA: 0
SHORTNESS OF BREATH: 0
NAUSEA: 1
NERVOUS/ANXIOUS: 0
WEAKNESS: 0

## 2021-04-13 ASSESSMENT — PAIN DESCRIPTION - PAIN TYPE
TYPE: ACUTE PAIN

## 2021-04-13 NOTE — PROGRESS NOTES
[facility] Medicine Daily Progress Note    Date of Service  4/13/2021    Chief Complaint  56 y.o. female admitted 4/11/2021 with pelvic mass.    Hospital Course  Admitted with pelvic mass, known history of non-Hodgkin's lymphoma.    Interval Problem Update  Pelvis mass -for biopsy today  Diabetes - -190  Low potassium    Consultants/Specialty  Surgery  Oncology:    Code Status  Full Code    Disposition  TBD     Review of Systems  Review of Systems   Constitutional: Negative for chills, diaphoresis, fever and malaise/fatigue.   HENT: Negative for congestion, hearing loss and sore throat.    Eyes: Negative for blurred vision and double vision.   Respiratory: Negative for cough, shortness of breath and wheezing.    Cardiovascular: Negative for chest pain, palpitations and leg swelling.   Gastrointestinal: Positive for abdominal pain and nausea. Negative for diarrhea, heartburn and vomiting.   Genitourinary: Negative for dysuria and frequency.   Musculoskeletal: Negative for back pain, joint pain, myalgias and neck pain.   Skin: Negative for itching and rash.   Neurological: Negative for dizziness, sensory change, speech change, focal weakness, weakness and headaches.   Endo/Heme/Allergies: Negative for environmental allergies. Does not bruise/bleed easily.   Psychiatric/Behavioral: The patient is not nervous/anxious.         Physical Exam  Temp:  [37.2 °C (99 °F)-37.9 °C (100.2 °F)] 37.7 °C (99.9 °F)  Pulse:  [68-95] 68  Resp:  [14-17] 17  BP: ()/(51-63) 99/59  SpO2:  [90 %-98 %] 90 %    Physical Exam  Vitals and nursing note reviewed.   HENT:      Head: Normocephalic and atraumatic.      Nose: No congestion.      Mouth/Throat:      Mouth: Mucous membranes are moist.   Eyes:      General: No scleral icterus.     Extraocular Movements: Extraocular movements intact.      Conjunctiva/sclera: Conjunctivae normal.      Pupils: Pupils are equal, round, and reactive to light.   Cardiovascular:      Rate and Rhythm:  Normal rate and regular rhythm.   Pulmonary:      Effort: Pulmonary effort is normal.      Breath sounds: Normal breath sounds.   Abdominal:      General: Bowel sounds are normal. There is no distension.      Palpations: Abdomen is soft.      Tenderness: There is abdominal tenderness (Suprapubic). There is no guarding or rebound.   Musculoskeletal:      Cervical back: No tenderness.      Right lower leg: No edema.      Left lower leg: No edema.   Skin:     General: Skin is warm and dry.   Neurological:      General: No focal deficit present.      Mental Status: She is alert and oriented to person, place, and time.      Cranial Nerves: No cranial nerve deficit.         Fluids    Intake/Output Summary (Last 24 hours) at 4/13/2021 0954  Last data filed at 4/12/2021 2246  Gross per 24 hour   Intake 460 ml   Output --   Net 460 ml       Laboratory  Recent Labs     04/12/21  0342 04/13/21  0025   WBC 8.3 6.9   RBC 4.01* 3.78*   HEMOGLOBIN 11.9* 11.1*   HEMATOCRIT 35.3* 33.3*   MCV 88.0 88.1   MCH 29.7 29.4   MCHC 33.7 33.3*   RDW 43.4 43.3   PLATELETCT 89* 111*   MPV 9.0 9.6     Recent Labs     04/12/21  0342 04/13/21  0025   SODIUM 135 134*   POTASSIUM 3.7 3.3*   CHLORIDE 103 102   CO2 21 22   GLUCOSE 220* 169*   BUN 14 11   CREATININE 0.96 0.96   CALCIUM 8.3* 8.4*     Recent Labs     04/11/21  2344   APTT 36.7*   INR 1.63*               Imaging  OUTSIDE IMAGES-CT ABDOMEN /PELVIS   Final Result      OUTSIDE IMAGES-DX CHEST   Final Result      CT-NEEDLE BX-ABD-RETROPERITONL    (Results Pending)        Assessment/Plan  * Other intra-abdominal and pelvic swelling, mass and lump- (present on admission)  Assessment & Plan  For biopsy  IV Rocephin and Flagyl    Non-Hodgkin lymphoma (HCC)- (present on admission)  Assessment & Plan  Oncology following    Sepsis (HCC)- (present on admission)  Assessment & Plan  This is Sepsis Present on admission  SIRS criteria identified on my evaluation include: Fever, with temperature greater  than 101 deg F and Tachycardia, with heart rate greater than 90 BPM  Source -abdominal/pelvic abscess?  Sepsis protocol initiated  Fluid resuscitation ordered per protocol  IV antibiotics as appropriate for source of sepsis  While organ dysfunction may be noted elsewhere in this problem list or in the chart, degree of organ dysfunction does not meet CMS criteria for severe sepsis  Monitor       Hypokalemia- (present on admission)  Assessment & Plan  Follow BMP, magnesium and phosphorus    Normocytic anemia- (present on admission)  Assessment & Plan  Follow cbc    Thrombocytopenia (HCC)- (present on admission)  Assessment & Plan  Follow CBC     Type 2 diabetes mellitus with hyperglycemia, with long-term current use of insulin (HCC)- (present on admission)  Assessment & Plan  SSI    History of DVT (deep vein thrombosis)- (present on admission)  Assessment & Plan  Hold Xarelto    Hypothyroidism- (present on admission)  Assessment & Plan  Synthroid       VTE prophylaxis: SCD

## 2021-04-13 NOTE — CARE PLAN
Problem: Safety  Goal: Will remain free from injury  Outcome: PROGRESSING AS EXPECTED  Goal: Will remain free from falls  Outcome: PROGRESSING AS EXPECTED   Updated about POC. Reinforce call light use. Pt acknowledged understanding    Problem: Bowel/Gastric:  Goal: Normal bowel function is maintained or improved  Outcome: PROGRESSING AS EXPECTED  Goal: Will not experience complications related to bowel motility  Outcome: PROGRESSING AS EXPECTED  Tolerating clears. NP at midnight     Problem: Pain Management  Goal: Pain level will decrease to patient's comfort goal  Outcome: PROGRESSING AS EXPECTED  Pain controlled with dilaudid prn

## 2021-04-13 NOTE — DISCHARGE PLANNING
Anticipated Discharge Disposition:   TBD    Action:   This RN CM is following the case. Plan is Pt  scheduled for core biopsy of pelvic mass today.    Barriers to Discharge:   Pending medical clearance    Plan:   Will continue to assist Pt with discharge as needed.

## 2021-04-13 NOTE — PROGRESS NOTES
Oncology/Hematology Progress Note               Author: Daphne Sue M.D. Date & Time created: 4/13/2021  1:45 PM   Diagnosis-?  Relapsed follicular non-Hodgkin's lymphoma presenting as pain abdomen and pelvic mass  Interval History:  Abdominal is somewhat better.  Last bowel movement was 2 days ago.  No fevers or chills.  Continues to be on antibiotics and metronidazole.    Review of Systems:  Review of Systems   Constitutional: Positive for malaise/fatigue. Negative for fever.   Cardiovascular: Negative for chest pain, palpitations and orthopnea.   Gastrointestinal: Positive for abdominal pain and nausea. Negative for heartburn.   Genitourinary: Negative for dysuria and hematuria.   Musculoskeletal: Negative for back pain, myalgias and neck pain.       Physical Exam:  Physical Exam  Constitutional:       Appearance: Normal appearance.   Cardiovascular:      Rate and Rhythm: Normal rate and regular rhythm.   Pulmonary:      Effort: Pulmonary effort is normal. No respiratory distress.      Breath sounds: Normal breath sounds. No stridor. No wheezing or rhonchi.   Abdominal:      General: Abdomen is flat.      Tenderness: There is abdominal tenderness. There is no rebound.   Neurological:      General: No focal deficit present.      Mental Status: She is alert and oriented to person, place, and time.         Labs:          Recent Labs     04/11/21  2344 04/12/21 0342 04/13/21  0025   SODIUM  --  135 134*   POTASSIUM  --  3.7 3.3*   CHLORIDE  --  103 102   CO2  --  21 22   BUN  --  14 11   CREATININE  --  0.96 0.96   MAGNESIUM 2.0  --   --    CALCIUM  --  8.3* 8.4*     Recent Labs     04/12/21 0342 04/13/21  0025   ALTSGPT 34  --    ASTSGOT 21  --    ALKPHOSPHAT 111*  --    TBILIRUBIN 1.4  --    GLUCOSE 220* 169*     Recent Labs     04/11/21  2344 04/12/21 0342 04/13/21  0025   RBC  --  4.01* 3.78*   HEMOGLOBIN  --  11.9* 11.1*   HEMATOCRIT  --  35.3* 33.3*   PLATELETCT  --  89* 111*   PROTHROMBTM 19.9*  --   --     APTT 36.7*  --   --    INR 1.63*  --   --      Recent Labs     21  0342 21  0025   WBC 8.3 6.9   NEUTSPOLYS 89.00*  --    LYMPHOCYTES 2.40*  --    MONOCYTES 6.40  --    EOSINOPHILS 0.40  --    BASOPHILS 0.10  --    ASTSGOT 21  --    ALTSGPT 34  --    ALKPHOSPHAT 111*  --    TBILIRUBIN 1.4  --      Recent Labs     21  0342 21  0025   SODIUM 135 134*   POTASSIUM 3.7 3.3*   CHLORIDE 103 102   CO2 21 22   GLUCOSE 220* 169*   BUN 14 11   CREATININE 0.96 0.96   CALCIUM 8.3* 8.4*     Hemodynamics:  Temp (24hrs), Av.6 °C (99.7 °F), Min:37.2 °C (99 °F), Max:37.9 °C (100.2 °F)  Temperature: 37.7 °C (99.9 °F)  Pulse  Av.8  Min: 68  Max: 97   Blood Pressure: (!) 99/59(RN notified)     Respiratory:    Respiration: 17, Pulse Oximetry: 90 %     Work Of Breathing / Effort: Shallow  RUL Breath Sounds: Clear, RML Breath Sounds: Clear, RLL Breath Sounds: Clear, ALFONZO Breath Sounds: Clear, LLL Breath Sounds: Clear  Fluids:    Intake/Output Summary (Last 24 hours) at 2021 1345  Last data filed at 2021 0900  Gross per 24 hour   Intake 100 ml   Output --   Net 100 ml        GI/Nutrition:  Orders Placed This Encounter   Procedures   • Diet NPO     Standing Status:   Standing     Number of Occurrences:   8     Order Specific Question:   Restrict to:     Answer:   Sips with Medications [3]     Medical Decision Making, by Problem:  Active Hospital Problems    Diagnosis    • *Other intra-abdominal and pelvic swelling, mass and lump [R19.09]    • Sepsis (HCC) [A41.9]    • Non-Hodgkin lymphoma (HCC) [C85.90]    • Hypokalemia [E87.6]    • Normocytic anemia [D64.9]    • Thrombocytopenia (HCC) [D69.6]    • History of DVT (deep vein thrombosis) [Z86.718]    • Type 2 diabetes mellitus with hyperglycemia, with long-term current use of insulin (HCC) [E11.65, Z79.4]    • Hypothyroidism [E03.9]        Plan:  Follicular non-Hodgkin's lymphoma-awaiting biopsy.  Hopefully today.  Further management will depend upon  the biopsy results.  Pain abdomen-well controlled.  Continue present management.  Continue cefepime and metronidazole.  We will follow-up  DVT-Xarelto on hold for the biopsy.  Will resume after biopsy.    Quality-Core Measures

## 2021-04-13 NOTE — PROGRESS NOTES
"Bedside report received.  Assessment complete.  A&O x 4. Patient calls appropriately.  Patient ambulates with standby assist. Bed alarm off.   Patient has 8/10 pain. Pain managed with prescribed medications.  Denies N&V. NPO diet.  + void, + flatus, last BM PTA.  Patient denies SOB.  SCD's on.  Patient is calm and cooperative with careplan.  Review plan with of care with patient. Call light and personal belongings with in reach. Hourly rounding in place. All needs met at this time.  BP (!) 99/59 Comment: RN notified  Pulse 68   Temp 37.7 °C (99.9 °F) (Temporal)   Resp 17   Ht 1.67 m (5' 5.75\")   Wt 76.9 kg (169 lb 8.5 oz)   LMP 11/01/2013   SpO2 90%   BMI 27.57 kg/m²     "

## 2021-04-13 NOTE — PROGRESS NOTES
Assumed care at 1845. Pt resting in bed. A&ox 4  Ambulating with SBA  C/o pelvic pain   controlled with dilaudid 0.5mg IV  Tolerating clears. NPO after midnight  O2 on 1LNC.  92% on RA. desats with narcotic administration  Voiding adequately  No bm today  Biopsy in AM.   Call light within reach. Hourly rounding in place

## 2021-04-14 ENCOUNTER — APPOINTMENT (OUTPATIENT)
Dept: RADIOLOGY | Facility: MEDICAL CENTER | Age: 56
DRG: 872 | End: 2021-04-14
Attending: INTERNAL MEDICINE
Payer: COMMERCIAL

## 2021-04-14 PROBLEM — E83.42 HYPOMAGNESEMIA: Status: ACTIVE | Noted: 2021-04-14

## 2021-04-14 LAB
ANION GAP SERPL CALC-SCNC: 9 MMOL/L (ref 7–16)
BASOPHILS # BLD AUTO: 0.5 % (ref 0–1.8)
BASOPHILS # BLD: 0.04 K/UL (ref 0–0.12)
BUN SERPL-MCNC: 9 MG/DL (ref 8–22)
CALCIUM SERPL-MCNC: 8.5 MG/DL (ref 8.5–10.5)
CHLORIDE SERPL-SCNC: 101 MMOL/L (ref 96–112)
CO2 SERPL-SCNC: 24 MMOL/L (ref 20–33)
CREAT SERPL-MCNC: 0.86 MG/DL (ref 0.5–1.4)
EOSINOPHIL # BLD AUTO: 0.1 K/UL (ref 0–0.51)
EOSINOPHIL NFR BLD: 1.3 % (ref 0–6.9)
ERYTHROCYTE [DISTWIDTH] IN BLOOD BY AUTOMATED COUNT: 40.6 FL (ref 35.9–50)
GLUCOSE BLD-MCNC: 138 MG/DL (ref 65–99)
GLUCOSE BLD-MCNC: 143 MG/DL (ref 65–99)
GLUCOSE BLD-MCNC: 168 MG/DL (ref 65–99)
GLUCOSE BLD-MCNC: 175 MG/DL (ref 65–99)
GLUCOSE SERPL-MCNC: 181 MG/DL (ref 65–99)
HCT VFR BLD AUTO: 32 % (ref 37–47)
HGB BLD-MCNC: 11 G/DL (ref 12–16)
IMM GRANULOCYTES # BLD AUTO: 0.07 K/UL (ref 0–0.11)
IMM GRANULOCYTES NFR BLD AUTO: 0.9 % (ref 0–0.9)
LYMPHOCYTES # BLD AUTO: 0.18 K/UL (ref 1–4.8)
LYMPHOCYTES NFR BLD: 2.4 % (ref 22–41)
MAGNESIUM SERPL-MCNC: 1.8 MG/DL (ref 1.5–2.5)
MCH RBC QN AUTO: 29.6 PG (ref 27–33)
MCHC RBC AUTO-ENTMCNC: 34.4 G/DL (ref 33.6–35)
MCV RBC AUTO: 86.3 FL (ref 81.4–97.8)
MONOCYTES # BLD AUTO: 0.5 K/UL (ref 0–0.85)
MONOCYTES NFR BLD AUTO: 6.6 % (ref 0–13.4)
NEUTROPHILS # BLD AUTO: 6.73 K/UL (ref 2–7.15)
NEUTROPHILS NFR BLD: 88.3 % (ref 44–72)
NRBC # BLD AUTO: 0 K/UL
NRBC BLD-RTO: 0 /100 WBC
PHOSPHATE SERPL-MCNC: 2.8 MG/DL (ref 2.5–4.5)
PLATELET # BLD AUTO: 117 K/UL (ref 164–446)
PMV BLD AUTO: 9.8 FL (ref 9–12.9)
POTASSIUM SERPL-SCNC: 3.4 MMOL/L (ref 3.6–5.5)
RBC # BLD AUTO: 3.71 M/UL (ref 4.2–5.4)
SODIUM SERPL-SCNC: 134 MMOL/L (ref 135–145)
WBC # BLD AUTO: 7.6 K/UL (ref 4.8–10.8)

## 2021-04-14 PROCEDURE — A9270 NON-COVERED ITEM OR SERVICE: HCPCS | Performed by: HOSPITALIST

## 2021-04-14 PROCEDURE — 88312 SPECIAL STAINS GROUP 1: CPT | Mod: 59

## 2021-04-14 PROCEDURE — 36415 COLL VENOUS BLD VENIPUNCTURE: CPT

## 2021-04-14 PROCEDURE — 700101 HCHG RX REV CODE 250: Performed by: HOSPITALIST

## 2021-04-14 PROCEDURE — 87040 BLOOD CULTURE FOR BACTERIA: CPT | Mod: 91

## 2021-04-14 PROCEDURE — 700111 HCHG RX REV CODE 636 W/ 250 OVERRIDE (IP): Performed by: FAMILY MEDICINE

## 2021-04-14 PROCEDURE — 770006 HCHG ROOM/CARE - MED/SURG/GYN SEMI*

## 2021-04-14 PROCEDURE — A9270 NON-COVERED ITEM OR SERVICE: HCPCS | Performed by: FAMILY MEDICINE

## 2021-04-14 PROCEDURE — 700111 HCHG RX REV CODE 636 W/ 250 OVERRIDE (IP): Performed by: RADIOLOGY

## 2021-04-14 PROCEDURE — 83735 ASSAY OF MAGNESIUM: CPT

## 2021-04-14 PROCEDURE — 85025 COMPLETE CBC W/AUTO DIFF WBC: CPT

## 2021-04-14 PROCEDURE — 700105 HCHG RX REV CODE 258: Performed by: STUDENT IN AN ORGANIZED HEALTH CARE EDUCATION/TRAINING PROGRAM

## 2021-04-14 PROCEDURE — 700102 HCHG RX REV CODE 250 W/ 637 OVERRIDE(OP): Performed by: HOSPITALIST

## 2021-04-14 PROCEDURE — A9270 NON-COVERED ITEM OR SERVICE: HCPCS | Performed by: STUDENT IN AN ORGANIZED HEALTH CARE EDUCATION/TRAINING PROGRAM

## 2021-04-14 PROCEDURE — 80048 BASIC METABOLIC PNL TOTAL CA: CPT

## 2021-04-14 PROCEDURE — 49180 BIOPSY ABDOMINAL MASS: CPT

## 2021-04-14 PROCEDURE — 82962 GLUCOSE BLOOD TEST: CPT

## 2021-04-14 PROCEDURE — 700101 HCHG RX REV CODE 250: Performed by: STUDENT IN AN ORGANIZED HEALTH CARE EDUCATION/TRAINING PROGRAM

## 2021-04-14 PROCEDURE — 84100 ASSAY OF PHOSPHORUS: CPT

## 2021-04-14 PROCEDURE — 700102 HCHG RX REV CODE 250 W/ 637 OVERRIDE(OP): Performed by: FAMILY MEDICINE

## 2021-04-14 PROCEDURE — 700102 HCHG RX REV CODE 250 W/ 637 OVERRIDE(OP): Performed by: STUDENT IN AN ORGANIZED HEALTH CARE EDUCATION/TRAINING PROGRAM

## 2021-04-14 PROCEDURE — 700111 HCHG RX REV CODE 636 W/ 250 OVERRIDE (IP): Performed by: STUDENT IN AN ORGANIZED HEALTH CARE EDUCATION/TRAINING PROGRAM

## 2021-04-14 PROCEDURE — 700111 HCHG RX REV CODE 636 W/ 250 OVERRIDE (IP)

## 2021-04-14 PROCEDURE — 88305 TISSUE EXAM BY PATHOLOGIST: CPT

## 2021-04-14 PROCEDURE — 99232 SBSQ HOSP IP/OBS MODERATE 35: CPT | Performed by: FAMILY MEDICINE

## 2021-04-14 RX ORDER — MAGNESIUM SULFATE HEPTAHYDRATE 40 MG/ML
2 INJECTION, SOLUTION INTRAVENOUS ONCE
Status: COMPLETED | OUTPATIENT
Start: 2021-04-14 | End: 2021-04-14

## 2021-04-14 RX ORDER — ONDANSETRON 2 MG/ML
4 INJECTION INTRAMUSCULAR; INTRAVENOUS PRN
Status: ACTIVE | OUTPATIENT
Start: 2021-04-14 | End: 2021-04-14

## 2021-04-14 RX ORDER — ACETAMINOPHEN 325 MG/1
650 TABLET ORAL EVERY 6 HOURS PRN
Status: DISCONTINUED | OUTPATIENT
Start: 2021-04-14 | End: 2021-04-21 | Stop reason: HOSPADM

## 2021-04-14 RX ORDER — MIDAZOLAM HYDROCHLORIDE 1 MG/ML
.5-2 INJECTION INTRAMUSCULAR; INTRAVENOUS PRN
Status: ACTIVE | OUTPATIENT
Start: 2021-04-14 | End: 2021-04-14

## 2021-04-14 RX ORDER — METRONIDAZOLE 500 MG/1
500 TABLET ORAL EVERY 8 HOURS
Status: COMPLETED | OUTPATIENT
Start: 2021-04-14 | End: 2021-04-18

## 2021-04-14 RX ORDER — SODIUM CHLORIDE AND POTASSIUM CHLORIDE 150; 900 MG/100ML; MG/100ML
INJECTION, SOLUTION INTRAVENOUS CONTINUOUS
Status: DISCONTINUED | OUTPATIENT
Start: 2021-04-14 | End: 2021-04-15

## 2021-04-14 RX ORDER — MIDAZOLAM HYDROCHLORIDE 1 MG/ML
INJECTION INTRAMUSCULAR; INTRAVENOUS
Status: COMPLETED
Start: 2021-04-14 | End: 2021-04-14

## 2021-04-14 RX ORDER — NALOXONE HYDROCHLORIDE 0.4 MG/ML
INJECTION, SOLUTION INTRAMUSCULAR; INTRAVENOUS; SUBCUTANEOUS
Status: COMPLETED
Start: 2021-04-14 | End: 2021-04-14

## 2021-04-14 RX ORDER — SODIUM CHLORIDE 9 MG/ML
500 INJECTION, SOLUTION INTRAVENOUS
Status: ACTIVE | OUTPATIENT
Start: 2021-04-14 | End: 2021-04-14

## 2021-04-14 RX ADMIN — POTASSIUM CHLORIDE 20 MEQ: 1500 TABLET, EXTENDED RELEASE ORAL at 06:13

## 2021-04-14 RX ADMIN — POTASSIUM CHLORIDE AND SODIUM CHLORIDE: 900; 150 INJECTION, SOLUTION INTRAVENOUS at 20:40

## 2021-04-14 RX ADMIN — LEVOTHYROXINE SODIUM 100 MCG: 0.05 TABLET ORAL at 06:13

## 2021-04-14 RX ADMIN — MAGNESIUM SULFATE 2 G: 2 INJECTION INTRAVENOUS at 08:14

## 2021-04-14 RX ADMIN — METRONIDAZOLE 500 MG: 500 TABLET ORAL at 22:19

## 2021-04-14 RX ADMIN — MIDAZOLAM HYDROCHLORIDE 1 MG: 1 INJECTION INTRAMUSCULAR; INTRAVENOUS at 16:36

## 2021-04-14 RX ADMIN — LORAZEPAM 0.5 MG: 1 TABLET ORAL at 17:24

## 2021-04-14 RX ADMIN — OXYCODONE 10 MG: 5 TABLET ORAL at 18:31

## 2021-04-14 RX ADMIN — FENTANYL CITRATE 50 MCG: 50 INJECTION, SOLUTION INTRAMUSCULAR; INTRAVENOUS at 16:36

## 2021-04-14 RX ADMIN — OMEPRAZOLE 20 MG: 20 CAPSULE, DELAYED RELEASE ORAL at 06:13

## 2021-04-14 RX ADMIN — FENTANYL CITRATE 50 MCG: 50 INJECTION, SOLUTION INTRAMUSCULAR; INTRAVENOUS at 16:41

## 2021-04-14 RX ADMIN — CEFTRIAXONE SODIUM 1 G: 1 INJECTION, POWDER, FOR SOLUTION INTRAMUSCULAR; INTRAVENOUS at 06:14

## 2021-04-14 RX ADMIN — INSULIN HUMAN 1 UNITS: 100 INJECTION, SOLUTION PARENTERAL at 06:18

## 2021-04-14 RX ADMIN — INSULIN HUMAN 1 UNITS: 100 INJECTION, SOLUTION PARENTERAL at 00:27

## 2021-04-14 RX ADMIN — METRONIDAZOLE 500 MG: 500 INJECTION, SOLUTION INTRAVENOUS at 06:53

## 2021-04-14 RX ADMIN — ACETAMINOPHEN 650 MG: 325 TABLET, FILM COATED ORAL at 20:40

## 2021-04-14 RX ADMIN — OXYCODONE 10 MG: 5 TABLET ORAL at 22:20

## 2021-04-14 RX ADMIN — MIDAZOLAM HYDROCHLORIDE 1 MG: 1 INJECTION, SOLUTION INTRAMUSCULAR; INTRAVENOUS at 16:36

## 2021-04-14 RX ADMIN — OXYCODONE 10 MG: 5 TABLET ORAL at 07:26

## 2021-04-14 RX ADMIN — LORAZEPAM 0.5 MG: 1 TABLET ORAL at 06:13

## 2021-04-14 RX ADMIN — METRONIDAZOLE 500 MG: 500 INJECTION, SOLUTION INTRAVENOUS at 13:58

## 2021-04-14 RX ADMIN — OXYCODONE 5 MG: 5 TABLET ORAL at 12:55

## 2021-04-14 ASSESSMENT — PAIN DESCRIPTION - PAIN TYPE
TYPE: ACUTE PAIN

## 2021-04-14 ASSESSMENT — ENCOUNTER SYMPTOMS
VOMITING: 0
HEARTBURN: 0
BLURRED VISION: 0
STRIDOR: 0
FOCAL WEAKNESS: 0
SHORTNESS OF BREATH: 0
DOUBLE VISION: 0
CHILLS: 0
NECK PAIN: 0
NERVOUS/ANXIOUS: 0
MYALGIAS: 0
SENSORY CHANGE: 0
BACK PAIN: 0
DIZZINESS: 0
PALPITATIONS: 0
NAUSEA: 1
DIAPHORESIS: 0
ORTHOPNEA: 0
SPEECH CHANGE: 0
ABDOMINAL PAIN: 1
SORE THROAT: 0
DIARRHEA: 0
FEVER: 0
WEAKNESS: 0
COUGH: 0
BRUISES/BLEEDS EASILY: 0
WHEEZING: 0
HEADACHES: 0

## 2021-04-14 NOTE — OR SURGEON
Immediate Post- Operative Note        PostOp Diagnosis: Pelvic mass.       Procedure(s): CT guided biopsy.       Estimated Blood Loss: Less than 5 ml        Complications: None            4/14/2021     1647 PM     Adalberto Beltran M.D.

## 2021-04-14 NOTE — CARE PLAN
Problem: Safety  Goal: Will remain free from injury  Outcome: PROGRESSING AS EXPECTED  Goal: Will remain free from falls  Outcome: PROGRESSING AS EXPECTED   Updated about POC. Reinforce call light use. Pt acknowledged understanding    Problem: Infection  Goal: Will remain free from infection  Outcome: PROGRESSING AS EXPECTED  Iv abx continued. On flagyl and rocephin     Problem: Pain Management  Goal: Pain level will decrease to patient's comfort goal  Outcome: PROGRESSING AS EXPECTED  Pain better controlled with oxy 10 q4 hrs.

## 2021-04-14 NOTE — PROGRESS NOTES
Assumed care at 1845. Pt resting in bed. A&ox 4  Ambulating independently  O2 on 1LNC.   Pain better controlled with oxy prn  Tolerating reg diet. NPO after midnight  Voiding adequately   No bm today  Biopsy in AM.   Iv abx continued  Call light within reach. Hourly rounding in place.

## 2021-04-14 NOTE — PROGRESS NOTES
"Bedside report received.  Assessment complete.  A&O x 4. Patient calls appropriately.  Patient ambulates with no assist. Bed alarm off.   Patient has 6/10 pain. Pain managed with prescribed medications.  Denies N&V. NPO diet.  + void, + flatus, last BM PTA.  Patient denies SOB.  SCD's on.  Patient is calm and cooperative with care plan.  Review plan with of care with patient. Call light and personal belongings with in reach. Hourly rounding in place. All needs met at this time.  /57   Pulse 95   Temp 37 °C (98.6 °F) (Temporal)   Resp 17   Ht 1.67 m (5' 5.75\")   Wt 76.9 kg (169 lb 8.5 oz)   LMP 11/01/2013   SpO2 96%   BMI 27.57 kg/m²     "

## 2021-04-14 NOTE — PROGRESS NOTES
Oncology/Hematology Progress Note               Author: Daphne Sue M.D. Date & Time created: 4/14/2021  1:07 PM   Diagnosis-?  Relapsed follicular non-Hodgkin's lymphoma presenting as pain abdomen and pelvic mass  Interval History:  Abdominal is much better.  Last bowel movement was today.  No fevers or chills.  Continues to be on antibiotics and metronidazole.  For biopsy today  Review of Systems:  Review of Systems   Constitutional: Positive for malaise/fatigue. Negative for fever.   HENT: Negative for hearing loss and sore throat.    Respiratory: Negative for stridor.    Cardiovascular: Negative for chest pain, palpitations and orthopnea.   Gastrointestinal: Positive for abdominal pain and nausea. Negative for heartburn.   Genitourinary: Negative for dysuria and hematuria.   Musculoskeletal: Negative for back pain, myalgias and neck pain.       Physical Exam:  Physical Exam  Constitutional:       Appearance: Normal appearance.   Cardiovascular:      Rate and Rhythm: Normal rate and regular rhythm.   Pulmonary:      Effort: Pulmonary effort is normal. No respiratory distress.      Breath sounds: Normal breath sounds. No stridor. No wheezing or rhonchi.   Abdominal:      General: Abdomen is flat.      Tenderness: There is abdominal tenderness. There is no rebound.   Neurological:      General: No focal deficit present.      Mental Status: She is alert and oriented to person, place, and time.         Labs:          Recent Labs     04/11/21  2344 04/12/21 0342 04/13/21 0025 04/14/21  0036   SODIUM  --  135 134* 134*   POTASSIUM  --  3.7 3.3* 3.4*   CHLORIDE  --  103 102 101   CO2  --  21 22 24   BUN  --  14 11 9   CREATININE  --  0.96 0.96 0.86   MAGNESIUM 2.0  --   --  1.8   PHOSPHORUS  --   --   --  2.8   CALCIUM  --  8.3* 8.4* 8.5     Recent Labs     04/12/21  0342 04/13/21  0025 04/14/21  0036   ALTSGPT 34  --   --    ASTSGOT 21  --   --    ALKPHOSPHAT 111*  --   --    TBILIRUBIN 1.4  --   --    GLUCOSE 220*  169* 181*     Recent Labs     21  2344 21  0025 21  0036   RBC  --  4.01* 3.78* 3.71*   HEMOGLOBIN  --  11.9* 11.1* 11.0*   HEMATOCRIT  --  35.3* 33.3* 32.0*   PLATELETCT  --  89* 111* 117*   PROTHROMBTM 19.9*  --   --   --    APTT 36.7*  --   --   --    INR 1.63*  --   --   --      Recent Labs     21  0025 21  0036   WBC 8.3 6.9 7.6   NEUTSPOLYS 89.00*  --  88.30*   LYMPHOCYTES 2.40*  --  2.40*   MONOCYTES 6.40  --  6.60   EOSINOPHILS 0.40  --  1.30   BASOPHILS 0.10  --  0.50   ASTSGOT 21  --   --    ALTSGPT 34  --   --    ALKPHOSPHAT 111*  --   --    TBILIRUBIN 1.4  --   --      Recent Labs     21  0025 21  0036   SODIUM 135 134* 134*   POTASSIUM 3.7 3.3* 3.4*   CHLORIDE 103 102 101   CO2 21 22 24   GLUCOSE 220* 169* 181*   BUN 14 11 9   CREATININE 0.96 0.96 0.86   CALCIUM 8.3* 8.4* 8.5     Hemodynamics:  Temp (24hrs), Av.5 °C (99.5 °F), Min:37 °C (98.6 °F), Max:37.7 °C (99.9 °F)  Temperature: 37 °C (98.6 °F)  Pulse  Av.9  Min: 68  Max: 97   Blood Pressure: 115/57     Respiratory:    Respiration: 17, Pulse Oximetry: 96 %     Work Of Breathing / Effort: Shallow  RUL Breath Sounds: Clear, RML Breath Sounds: Clear, RLL Breath Sounds: Clear, ALFONZO Breath Sounds: Clear, LLL Breath Sounds: Clear  Fluids:    Intake/Output Summary (Last 24 hours) at 2021 1345  Last data filed at 2021 0900  Gross per 24 hour   Intake 100 ml   Output --   Net 100 ml        GI/Nutrition:  Orders Placed This Encounter   Procedures   • Diet NPO     Standing Status:   Standing     Number of Occurrences:   8     Order Specific Question:   Restrict to:     Answer:   Strict [1]     Medical Decision Making, by Problem:  Active Hospital Problems    Diagnosis    • *Other intra-abdominal and pelvic swelling, mass and lump [R19.09]    • Sepsis (HCC) [A41.9]    • Non-Hodgkin lymphoma (HCC) [C85.90]    • Hypokalemia [E87.6]    • Normocytic anemia  [D64.9]    • Thrombocytopenia (HCC) [D69.6]    • History of DVT (deep vein thrombosis) [Z86.718]    • Type 2 diabetes mellitus with hyperglycemia, with long-term current use of insulin (HCC) [E11.65, Z79.4]    • Hypothyroidism [E03.9]        Plan:  Follicular non-Hodgkin's lymphoma-awaiting biopsy.  Hopefully today.  Further management will depend upon the biopsy results.  Pain abdomen-much better controlled.  Continue present management.  Continue cefepime and metronidazole.  We will follow-up  DVT-Xarelto on hold for the biopsy.  Will resume after biopsy.    Quality-Core Measures

## 2021-04-14 NOTE — PROGRESS NOTES
Patient underwent a pelvic mass biopsy by Dr. Beltran, assisted by Yung DUBOSE. Patient signed consent prior to sedation medication.     Patient was placed in a supine position. MD Beltran located correct site using imaging guidance. Vitals were taken every 5 minutes and remained stable during procedure (see doc flow sheet for results). CO2 waveform capnography was monitored and remained 28-32 throughout procedure. Patient appeared to tolerate procedure well, all sedation medication given at discretion of MD Beltran. A tegaderm and gauze dressing was placed over right lower abdomen  surgical site. Report called to Viktor POOL. Pt transported by bed with this ACLS RN to [facility] [id number]. Core Labs X 3 in formalin hand delivered to lab by Yung DUBOSE. During bedside handoff with Viktor POOL, patient is awake and talking, surgical site C/D/I, post op orders released per protocol

## 2021-04-14 NOTE — PROGRESS NOTES
[facility] Medicine Daily Progress Note    Date of Service  4/14/2021    Chief Complaint  56 y.o. female admitted 4/11/2021 with pelvic mass.    Hospital Course  Admitted with pelvic mass, known history of non-Hodgkin's lymphoma.    Interval Problem Update  Pelvis mass - biopsy rescheduled for today  Diabetes - -170  Low potassium, magnesium    Updates given and plan of care discussed with patient's spouse.    Consultants/Specialty  Surgery  Oncology    Code Status  Full Code    Disposition  TBD     Review of Systems  Review of Systems   Constitutional: Negative for chills, diaphoresis, fever and malaise/fatigue.   HENT: Negative for congestion, hearing loss and sore throat.    Eyes: Negative for blurred vision and double vision.   Respiratory: Negative for cough, shortness of breath and wheezing.    Cardiovascular: Negative for chest pain, palpitations and leg swelling.   Gastrointestinal: Positive for abdominal pain and nausea. Negative for diarrhea, heartburn and vomiting.   Genitourinary: Negative for dysuria and frequency.   Musculoskeletal: Negative for back pain, joint pain, myalgias and neck pain.   Skin: Negative for itching and rash.   Neurological: Negative for dizziness, sensory change, speech change, focal weakness, weakness and headaches.   Endo/Heme/Allergies: Negative for environmental allergies. Does not bruise/bleed easily.   Psychiatric/Behavioral: The patient is not nervous/anxious.         Physical Exam  Temp:  [37 °C (98.6 °F)-37.7 °C (99.9 °F)] 37 °C (98.6 °F)  Pulse:  [89-97] 95  Resp:  [16-19] 17  BP: (115-138)/(55-59) 115/57  SpO2:  [93 %-96 %] 96 %    Physical Exam  Vitals and nursing note reviewed.   HENT:      Head: Normocephalic and atraumatic.      Nose: No congestion.      Mouth/Throat:      Mouth: Mucous membranes are moist.   Eyes:      General: No scleral icterus.     Extraocular Movements: Extraocular movements intact.      Conjunctiva/sclera: Conjunctivae normal.      Pupils:  Pupils are equal, round, and reactive to light.   Cardiovascular:      Rate and Rhythm: Normal rate and regular rhythm.   Pulmonary:      Effort: Pulmonary effort is normal.      Breath sounds: Normal breath sounds.   Abdominal:      General: Bowel sounds are normal. There is no distension.      Palpations: Abdomen is soft.      Tenderness: There is abdominal tenderness (Suprapubic). There is no guarding or rebound.   Musculoskeletal:      Cervical back: No tenderness.      Right lower leg: No edema.      Left lower leg: No edema.   Skin:     General: Skin is warm and dry.   Neurological:      General: No focal deficit present.      Mental Status: She is alert and oriented to person, place, and time.      Cranial Nerves: No cranial nerve deficit.         Fluids    Intake/Output Summary (Last 24 hours) at 4/14/2021 1008  Last data filed at 4/14/2021 0644  Gross per 24 hour   Intake 100 ml   Output --   Net 100 ml       Laboratory  Recent Labs     04/12/21  0342 04/13/21  0025 04/14/21  0036   WBC 8.3 6.9 7.6   RBC 4.01* 3.78* 3.71*   HEMOGLOBIN 11.9* 11.1* 11.0*   HEMATOCRIT 35.3* 33.3* 32.0*   MCV 88.0 88.1 86.3   MCH 29.7 29.4 29.6   MCHC 33.7 33.3* 34.4   RDW 43.4 43.3 40.6   PLATELETCT 89* 111* 117*   MPV 9.0 9.6 9.8     Recent Labs     04/12/21  0342 04/13/21  0025 04/14/21  0036   SODIUM 135 134* 134*   POTASSIUM 3.7 3.3* 3.4*   CHLORIDE 103 102 101   CO2 21 22 24   GLUCOSE 220* 169* 181*   BUN 14 11 9   CREATININE 0.96 0.96 0.86   CALCIUM 8.3* 8.4* 8.5     Recent Labs     04/11/21  2344   APTT 36.7*   INR 1.63*               Imaging  OUTSIDE IMAGES-CT ABDOMEN /PELVIS   Final Result      OUTSIDE IMAGES-DX CHEST   Final Result      CT-NEEDLE BX-ABD-RETROPERITONL    (Results Pending)        Assessment/Plan  * Other intra-abdominal and pelvic swelling, mass and lump- (present on admission)  Assessment & Plan  For biopsy  IV Rocephin and Flagyl    Non-Hodgkin lymphoma (HCC)- (present on admission)  Assessment &  Plan  Oncology following    Sepsis (HCC)- (present on admission)  Assessment & Plan  This is Sepsis Present on admission  SIRS criteria identified on my evaluation include: Fever, with temperature greater than 101 deg F and Tachycardia, with heart rate greater than 90 BPM  Source -abdominal/pelvic abscess?  Sepsis protocol initiated  Fluid resuscitation ordered per protocol  IV antibiotics as appropriate for source of sepsis  While organ dysfunction may be noted elsewhere in this problem list or in the chart, degree of organ dysfunction does not meet CMS criteria for severe sepsis  Monitor       Hypomagnesemia- (present on admission)  Assessment & Plan  IV Mg 2 g  Follow level    Hypokalemia- (present on admission)  Assessment & Plan  Kdur, follow BMP    Normocytic anemia- (present on admission)  Assessment & Plan  Follow cbc    Thrombocytopenia (HCC)- (present on admission)  Assessment & Plan  Follow CBC     Type 2 diabetes mellitus with hyperglycemia, with long-term current use of insulin (HCC)- (present on admission)  Assessment & Plan  SSI    History of DVT (deep vein thrombosis)- (present on admission)  Assessment & Plan  Hold Xarelto    Hypothyroidism- (present on admission)  Assessment & Plan  Synthroid       VTE prophylaxis: SCD

## 2021-04-15 LAB
ANION GAP SERPL CALC-SCNC: 13 MMOL/L (ref 7–16)
BASOPHILS # BLD AUTO: 0.2 % (ref 0–1.8)
BASOPHILS # BLD: 0.02 K/UL (ref 0–0.12)
BUN SERPL-MCNC: 9 MG/DL (ref 8–22)
CALCIUM SERPL-MCNC: 8.4 MG/DL (ref 8.5–10.5)
CHLORIDE SERPL-SCNC: 97 MMOL/L (ref 96–112)
CO2 SERPL-SCNC: 23 MMOL/L (ref 20–33)
CREAT SERPL-MCNC: 1.03 MG/DL (ref 0.5–1.4)
EOSINOPHIL # BLD AUTO: 0.03 K/UL (ref 0–0.51)
EOSINOPHIL NFR BLD: 0.3 % (ref 0–6.9)
ERYTHROCYTE [DISTWIDTH] IN BLOOD BY AUTOMATED COUNT: 42.3 FL (ref 35.9–50)
GLUCOSE BLD-MCNC: 130 MG/DL (ref 65–99)
GLUCOSE BLD-MCNC: 158 MG/DL (ref 65–99)
GLUCOSE BLD-MCNC: 161 MG/DL (ref 65–99)
GLUCOSE SERPL-MCNC: 167 MG/DL (ref 65–99)
HCT VFR BLD AUTO: 33.4 % (ref 37–47)
HGB BLD-MCNC: 11 G/DL (ref 12–16)
IMM GRANULOCYTES # BLD AUTO: 0.22 K/UL (ref 0–0.11)
IMM GRANULOCYTES NFR BLD AUTO: 2.2 % (ref 0–0.9)
LACTATE BLD-SCNC: 1.1 MMOL/L (ref 0.5–2)
LACTATE BLD-SCNC: 1.3 MMOL/L (ref 0.5–2)
LACTATE BLD-SCNC: 1.7 MMOL/L (ref 0.5–2)
LYMPHOCYTES # BLD AUTO: 0.16 K/UL (ref 1–4.8)
LYMPHOCYTES NFR BLD: 1.6 % (ref 22–41)
MAGNESIUM SERPL-MCNC: 2 MG/DL (ref 1.5–2.5)
MCH RBC QN AUTO: 29.1 PG (ref 27–33)
MCHC RBC AUTO-ENTMCNC: 32.9 G/DL (ref 33.6–35)
MCV RBC AUTO: 88.4 FL (ref 81.4–97.8)
MONOCYTES # BLD AUTO: 0.59 K/UL (ref 0–0.85)
MONOCYTES NFR BLD AUTO: 5.8 % (ref 0–13.4)
NEUTROPHILS # BLD AUTO: 9.13 K/UL (ref 2–7.15)
NEUTROPHILS NFR BLD: 89.9 % (ref 44–72)
NRBC # BLD AUTO: 0 K/UL
NRBC BLD-RTO: 0 /100 WBC
PATHOLOGY CONSULT NOTE: NORMAL
PLATELET # BLD AUTO: 131 K/UL (ref 164–446)
PMV BLD AUTO: 9.9 FL (ref 9–12.9)
POTASSIUM SERPL-SCNC: 3.7 MMOL/L (ref 3.6–5.5)
RBC # BLD AUTO: 3.78 M/UL (ref 4.2–5.4)
SODIUM SERPL-SCNC: 133 MMOL/L (ref 135–145)
WBC # BLD AUTO: 10.2 K/UL (ref 4.8–10.8)

## 2021-04-15 PROCEDURE — 770006 HCHG ROOM/CARE - MED/SURG/GYN SEMI*

## 2021-04-15 PROCEDURE — 700111 HCHG RX REV CODE 636 W/ 250 OVERRIDE (IP): Performed by: STUDENT IN AN ORGANIZED HEALTH CARE EDUCATION/TRAINING PROGRAM

## 2021-04-15 PROCEDURE — 700111 HCHG RX REV CODE 636 W/ 250 OVERRIDE (IP): Performed by: FAMILY MEDICINE

## 2021-04-15 PROCEDURE — A9270 NON-COVERED ITEM OR SERVICE: HCPCS | Performed by: STUDENT IN AN ORGANIZED HEALTH CARE EDUCATION/TRAINING PROGRAM

## 2021-04-15 PROCEDURE — 700102 HCHG RX REV CODE 250 W/ 637 OVERRIDE(OP): Performed by: FAMILY MEDICINE

## 2021-04-15 PROCEDURE — 700102 HCHG RX REV CODE 250 W/ 637 OVERRIDE(OP): Performed by: HOSPITALIST

## 2021-04-15 PROCEDURE — 700105 HCHG RX REV CODE 258: Performed by: FAMILY MEDICINE

## 2021-04-15 PROCEDURE — 700102 HCHG RX REV CODE 250 W/ 637 OVERRIDE(OP): Performed by: STUDENT IN AN ORGANIZED HEALTH CARE EDUCATION/TRAINING PROGRAM

## 2021-04-15 PROCEDURE — 80048 BASIC METABOLIC PNL TOTAL CA: CPT

## 2021-04-15 PROCEDURE — 83605 ASSAY OF LACTIC ACID: CPT

## 2021-04-15 PROCEDURE — 700105 HCHG RX REV CODE 258: Performed by: STUDENT IN AN ORGANIZED HEALTH CARE EDUCATION/TRAINING PROGRAM

## 2021-04-15 PROCEDURE — 99232 SBSQ HOSP IP/OBS MODERATE 35: CPT | Performed by: FAMILY MEDICINE

## 2021-04-15 PROCEDURE — 85025 COMPLETE CBC W/AUTO DIFF WBC: CPT

## 2021-04-15 PROCEDURE — A9270 NON-COVERED ITEM OR SERVICE: HCPCS | Performed by: HOSPITALIST

## 2021-04-15 PROCEDURE — 82962 GLUCOSE BLOOD TEST: CPT | Mod: 91

## 2021-04-15 PROCEDURE — 36415 COLL VENOUS BLD VENIPUNCTURE: CPT

## 2021-04-15 PROCEDURE — 83735 ASSAY OF MAGNESIUM: CPT

## 2021-04-15 PROCEDURE — A9270 NON-COVERED ITEM OR SERVICE: HCPCS | Performed by: FAMILY MEDICINE

## 2021-04-15 RX ORDER — SODIUM CHLORIDE, SODIUM LACTATE, POTASSIUM CHLORIDE, AND CALCIUM CHLORIDE .6; .31; .03; .02 G/100ML; G/100ML; G/100ML; G/100ML
30 INJECTION, SOLUTION INTRAVENOUS ONCE
Status: COMPLETED | OUTPATIENT
Start: 2021-04-15 | End: 2021-04-15

## 2021-04-15 RX ORDER — SODIUM CHLORIDE 9 MG/ML
INJECTION, SOLUTION INTRAVENOUS CONTINUOUS
Status: DISCONTINUED | OUTPATIENT
Start: 2021-04-15 | End: 2021-04-21 | Stop reason: HOSPADM

## 2021-04-15 RX ORDER — SODIUM CHLORIDE 9 MG/ML
INJECTION, SOLUTION INTRAVENOUS CONTINUOUS
Status: DISCONTINUED | OUTPATIENT
Start: 2021-04-15 | End: 2021-04-15

## 2021-04-15 RX ORDER — SODIUM CHLORIDE, SODIUM LACTATE, POTASSIUM CHLORIDE, AND CALCIUM CHLORIDE .6; .31; .03; .02 G/100ML; G/100ML; G/100ML; G/100ML
1000 INJECTION, SOLUTION INTRAVENOUS
Status: DISCONTINUED | OUTPATIENT
Start: 2021-04-15 | End: 2021-04-21 | Stop reason: HOSPADM

## 2021-04-15 RX ADMIN — LEVOTHYROXINE SODIUM 100 MCG: 0.05 TABLET ORAL at 05:09

## 2021-04-15 RX ADMIN — CEFEPIME 2 G: 2 INJECTION, POWDER, FOR SOLUTION INTRAVENOUS at 17:50

## 2021-04-15 RX ADMIN — METRONIDAZOLE 500 MG: 500 TABLET ORAL at 21:16

## 2021-04-15 RX ADMIN — OXYCODONE 10 MG: 5 TABLET ORAL at 05:09

## 2021-04-15 RX ADMIN — OXYCODONE 5 MG: 5 TABLET ORAL at 17:42

## 2021-04-15 RX ADMIN — OXYCODONE 10 MG: 5 TABLET ORAL at 22:25

## 2021-04-15 RX ADMIN — LORAZEPAM 0.5 MG: 1 TABLET ORAL at 05:09

## 2021-04-15 RX ADMIN — CEFTRIAXONE SODIUM 1 G: 1 INJECTION, POWDER, FOR SOLUTION INTRAMUSCULAR; INTRAVENOUS at 05:09

## 2021-04-15 RX ADMIN — LORAZEPAM 0.5 MG: 1 TABLET ORAL at 17:42

## 2021-04-15 RX ADMIN — INSULIN HUMAN 1 UNITS: 100 INJECTION, SOLUTION PARENTERAL at 05:17

## 2021-04-15 RX ADMIN — SODIUM CHLORIDE, POTASSIUM CHLORIDE, SODIUM LACTATE AND CALCIUM CHLORIDE 2307 ML: 600; 310; 30; 20 INJECTION, SOLUTION INTRAVENOUS at 08:16

## 2021-04-15 RX ADMIN — ACETAMINOPHEN 650 MG: 325 TABLET, FILM COATED ORAL at 05:09

## 2021-04-15 RX ADMIN — OMEPRAZOLE 20 MG: 20 CAPSULE, DELAYED RELEASE ORAL at 05:09

## 2021-04-15 RX ADMIN — OXYCODONE 10 MG: 5 TABLET ORAL at 11:40

## 2021-04-15 RX ADMIN — ACETAMINOPHEN 650 MG: 325 TABLET, FILM COATED ORAL at 21:16

## 2021-04-15 RX ADMIN — CEFEPIME 2 G: 2 INJECTION, POWDER, FOR SOLUTION INTRAVENOUS at 08:13

## 2021-04-15 RX ADMIN — METRONIDAZOLE 500 MG: 500 TABLET ORAL at 13:54

## 2021-04-15 RX ADMIN — POTASSIUM CHLORIDE 20 MEQ: 1500 TABLET, EXTENDED RELEASE ORAL at 05:09

## 2021-04-15 RX ADMIN — METRONIDAZOLE 500 MG: 500 TABLET ORAL at 05:09

## 2021-04-15 RX ADMIN — SODIUM CHLORIDE: 9 INJECTION, SOLUTION INTRAVENOUS at 13:01

## 2021-04-15 RX ADMIN — INSULIN HUMAN 1 UNITS: 100 INJECTION, SOLUTION PARENTERAL at 00:00

## 2021-04-15 ASSESSMENT — ENCOUNTER SYMPTOMS
NAUSEA: 1
CHILLS: 0
BLURRED VISION: 0
DOUBLE VISION: 0
PALPITATIONS: 0
ABDOMINAL PAIN: 1
DIZZINESS: 0
SENSORY CHANGE: 0
SHORTNESS OF BREATH: 0
COUGH: 0
BACK PAIN: 0
STRIDOR: 0
FEVER: 1
DIAPHORESIS: 0
SPEECH CHANGE: 0
SORE THROAT: 0
NERVOUS/ANXIOUS: 0
WEAKNESS: 0
FOCAL WEAKNESS: 0
BRUISES/BLEEDS EASILY: 0
HEADACHES: 0
VOMITING: 0
ORTHOPNEA: 0
NECK PAIN: 0
MYALGIAS: 0
WHEEZING: 0
HEARTBURN: 0
DIARRHEA: 0

## 2021-04-15 ASSESSMENT — PAIN DESCRIPTION - PAIN TYPE
TYPE: ACUTE PAIN

## 2021-04-15 ASSESSMENT — FIBROSIS 4 INDEX: FIB4 SCORE: 1.48

## 2021-04-15 NOTE — CARE PLAN
Problem: Communication  Goal: The ability to communicate needs accurately and effectively will improve  Outcome: PROGRESSING AS EXPECTED  Note: All questions and concerns addressed with patient, patient verbalizes understanding of current plan of care      Problem: Pain Management  Goal: Pain level will decrease to patient's comfort goal  Outcome: PROGRESSING AS EXPECTED  Note: Pain managed with prescribed medications and alternative interventions, patient able to rest and sleep comfortably throughout the night

## 2021-04-15 NOTE — PROGRESS NOTES
Cross cover    Fever with mild tachycardia, 103, bp stable  I have ordered ns, blood cultures and tylenol    Following

## 2021-04-15 NOTE — CARE PLAN
Problem: Pain Management  Goal: Pain level will decrease to patient's comfort goal  Outcome: PROGRESSING AS EXPECTED  Note: Medication given for pain, pt reports pain improved.      Problem: Fluid Volume:  Goal: Will maintain balanced intake and output  Outcome: PROGRESSING AS EXPECTED  Note: IV bolus administered per order. Adequate urine output this morning

## 2021-04-15 NOTE — PROGRESS NOTES
Patient oral tempeture of 102.5  Tachycardic in the [place] [id number]'s   Hospitalist paged   Dr. Nicole returned page  New orders obtained

## 2021-04-15 NOTE — PROGRESS NOTES
Pt A&Ox4    Patient uses call light appropriately to call for assistance     Patient rates pain as 7/10, medicated per MAR, pain managed with prescribed medications and alternative interventions     Tolerating regular diet, denies n/v. +bowel sounds, +flatus, LBM PTA     Saturating >90% on 2L O2 via NC   Patient denies SOB     Bone biopsy site to R lower abdomen   Covered with gauze and Tegaderm CDI     Pt ambulates with standby assistance and a steady gait. Patient does have some general weakness, patient educated on the importance of calling before she gets up, patient verbalizes understanding     Updated on plan of care. Safety education provided. Bed locked in low. Call light within reach. Rounding in place.

## 2021-04-15 NOTE — PROGRESS NOTES
[facility] Medicine Daily Progress Note    Date of Service  4/15/2021    Chief Complaint  56 y.o. female admitted 4/11/2021 with pelvic mass.    Hospital Course  Admitted with pelvic mass, known history of non-Hodgkin's lymphoma.    Interval Problem Update  Pelvis mass - biopsy done, she had fever last night, T-max of 102.7  Diabetes - -160    Consultants/Specialty  Surgery  Oncology    Code Status  Full Code    Disposition  TBD     Review of Systems  Review of Systems   Constitutional: Positive for fever. Negative for chills, diaphoresis and malaise/fatigue.   HENT: Negative for congestion, hearing loss and sore throat.    Eyes: Negative for blurred vision and double vision.   Respiratory: Negative for cough, shortness of breath and wheezing.    Cardiovascular: Negative for chest pain, palpitations and leg swelling.   Gastrointestinal: Positive for abdominal pain and nausea. Negative for diarrhea, heartburn and vomiting.   Genitourinary: Negative for dysuria and frequency.   Musculoskeletal: Negative for back pain, joint pain, myalgias and neck pain.   Skin: Negative for itching and rash.   Neurological: Negative for dizziness, sensory change, speech change, focal weakness, weakness and headaches.   Endo/Heme/Allergies: Negative for environmental allergies. Does not bruise/bleed easily.   Psychiatric/Behavioral: The patient is not nervous/anxious.         Physical Exam  Temp:  [36.1 °C (97 °F)-39.3 °C (102.7 °F)] 36.1 °C (97 °F)  Pulse:  [] 98  Resp:  [14-20] 16  BP: ()/(50-89) 107/50  SpO2:  [91 %-100 %] 100 %    Physical Exam  Vitals and nursing note reviewed.   HENT:      Head: Normocephalic and atraumatic.      Nose: No congestion.      Mouth/Throat:      Mouth: Mucous membranes are moist.   Eyes:      General: No scleral icterus.     Extraocular Movements: Extraocular movements intact.      Conjunctiva/sclera: Conjunctivae normal.      Pupils: Pupils are equal, round, and reactive to light.    Cardiovascular:      Rate and Rhythm: Normal rate and regular rhythm.   Pulmonary:      Effort: Pulmonary effort is normal.      Breath sounds: Normal breath sounds.   Abdominal:      General: Bowel sounds are normal. There is no distension.      Palpations: Abdomen is soft.      Tenderness: There is abdominal tenderness (Suprapubic). There is no guarding or rebound.   Musculoskeletal:      Cervical back: No tenderness.      Right lower leg: No edema.      Left lower leg: No edema.   Skin:     General: Skin is warm and dry.   Neurological:      General: No focal deficit present.      Mental Status: She is alert and oriented to person, place, and time.      Cranial Nerves: No cranial nerve deficit.         Fluids    Intake/Output Summary (Last 24 hours) at 4/15/2021 1441  Last data filed at 4/15/2021 0800  Gross per 24 hour   Intake 1650 ml   Output --   Net 1650 ml       Laboratory  Recent Labs     04/13/21  0025 04/14/21  0036 04/15/21  0015   WBC 6.9 7.6 10.2   RBC 3.78* 3.71* 3.78*   HEMOGLOBIN 11.1* 11.0* 11.0*   HEMATOCRIT 33.3* 32.0* 33.4*   MCV 88.1 86.3 88.4   MCH 29.4 29.6 29.1   MCHC 33.3* 34.4 32.9*   RDW 43.3 40.6 42.3   PLATELETCT 111* 117* 131*   MPV 9.6 9.8 9.9     Recent Labs     04/13/21  0025 04/14/21  0036 04/15/21  0015   SODIUM 134* 134* 133*   POTASSIUM 3.3* 3.4* 3.7   CHLORIDE 102 101 97   CO2 22 24 23   GLUCOSE 169* 181* 167*   BUN 11 9 9   CREATININE 0.96 0.86 1.03   CALCIUM 8.4* 8.5 8.4*                   Imaging  CT-NEEDLE BX-ABD-RETROPERITONL   Final Result      1.  CT GUIDED Right RETROPERITONEAL BIOPSY.      OUTSIDE IMAGES-CT ABDOMEN /PELVIS   Final Result      OUTSIDE IMAGES-DX CHEST   Final Result           Assessment/Plan  * Other intra-abdominal and pelvic swelling, mass and lump- (present on admission)  Assessment & Plan  Change to IV Cefepime   Continue Flagyl  Follow culture and pathology results    Non-Hodgkin lymphoma (HCC)- (present on admission)  Assessment & Plan  Oncology  following    Sepsis (HCC)- (present on admission)  Assessment & Plan  This is Sepsis Present on admission  SIRS criteria identified on my evaluation include: Fever, with temperature greater than 101 deg F and Tachycardia, with heart rate greater than 90 BPM  Source -abdominal/pelvic abscess?  Sepsis protocol initiated  Fluid resuscitation ordered per protocol  IV antibiotics as appropriate for source of sepsis  While organ dysfunction may be noted elsewhere in this problem list or in the chart, degree of organ dysfunction does not meet CMS criteria for severe sepsis  Monitor       Hypomagnesemia- (present on admission)  Assessment & Plan  IV Mg given  Follow level    Hypokalemia- (present on admission)  Assessment & Plan  Kdur, follow BMP    Normocytic anemia- (present on admission)  Assessment & Plan  Follow cbc    Thrombocytopenia (HCC)- (present on admission)  Assessment & Plan  Follow CBC     Type 2 diabetes mellitus with hyperglycemia, with long-term current use of insulin (HCC)- (present on admission)  Assessment & Plan  SSI    History of DVT (deep vein thrombosis)- (present on admission)  Assessment & Plan  Hold Xarelto    Hypothyroidism- (present on admission)  Assessment & Plan  Synthroid       VTE prophylaxis: SCD

## 2021-04-15 NOTE — PROGRESS NOTES
Assessment complete.  A&O x 4. Patient calls appropriately.  Patient ambulates with SBA assist.   Patient has 3/10 pain. Pain managed with prescribed medications.  Denies N&V. Tolerating regular diet.  Biopsy site R flank  + void, + flatus, PTA BM.  Patient denies SOB.  Ultrasound IV placed. Patient hypotensive this morning with intermittent tachycardia. Pt c/o dizziness with ambulation. MD aware. LR bolus administered per order.     Review plan with of care with patient. Call light and personal belongings with in reach. Hourly rounding in place. All needs met at this time.

## 2021-04-16 ENCOUNTER — APPOINTMENT (OUTPATIENT)
Dept: RADIOLOGY | Facility: MEDICAL CENTER | Age: 56
DRG: 872 | End: 2021-04-16
Attending: FAMILY MEDICINE
Payer: COMMERCIAL

## 2021-04-16 LAB
ANION GAP SERPL CALC-SCNC: 13 MMOL/L (ref 7–16)
BASOPHILS # BLD AUTO: 0.3 % (ref 0–1.8)
BASOPHILS # BLD: 0.02 K/UL (ref 0–0.12)
BUN SERPL-MCNC: 8 MG/DL (ref 8–22)
CALCIUM SERPL-MCNC: 8.7 MG/DL (ref 8.5–10.5)
CHLORIDE SERPL-SCNC: 98 MMOL/L (ref 96–112)
CO2 SERPL-SCNC: 21 MMOL/L (ref 20–33)
CREAT SERPL-MCNC: 0.77 MG/DL (ref 0.5–1.4)
EOSINOPHIL # BLD AUTO: 0.04 K/UL (ref 0–0.51)
EOSINOPHIL NFR BLD: 0.6 % (ref 0–6.9)
ERYTHROCYTE [DISTWIDTH] IN BLOOD BY AUTOMATED COUNT: 47.9 FL (ref 35.9–50)
GLUCOSE BLD-MCNC: 103 MG/DL (ref 65–99)
GLUCOSE BLD-MCNC: 109 MG/DL (ref 65–99)
GLUCOSE BLD-MCNC: 119 MG/DL (ref 65–99)
GLUCOSE BLD-MCNC: 125 MG/DL (ref 65–99)
GLUCOSE BLD-MCNC: 132 MG/DL (ref 65–99)
GLUCOSE SERPL-MCNC: 117 MG/DL (ref 65–99)
HCT VFR BLD AUTO: 40.4 % (ref 37–47)
HGB BLD-MCNC: 12.6 G/DL (ref 12–16)
IMM GRANULOCYTES # BLD AUTO: 0.1 K/UL (ref 0–0.11)
IMM GRANULOCYTES NFR BLD AUTO: 1.6 % (ref 0–0.9)
LYMPHOCYTES # BLD AUTO: 0.18 K/UL (ref 1–4.8)
LYMPHOCYTES NFR BLD: 2.9 % (ref 22–41)
MAGNESIUM SERPL-MCNC: 2.1 MG/DL (ref 1.5–2.5)
MCH RBC QN AUTO: 29.9 PG (ref 27–33)
MCHC RBC AUTO-ENTMCNC: 31.2 G/DL (ref 33.6–35)
MCV RBC AUTO: 96 FL (ref 81.4–97.8)
MONOCYTES # BLD AUTO: 0.31 K/UL (ref 0–0.85)
MONOCYTES NFR BLD AUTO: 4.9 % (ref 0–13.4)
NEUTROPHILS # BLD AUTO: 5.66 K/UL (ref 2–7.15)
NEUTROPHILS NFR BLD: 89.7 % (ref 44–72)
NRBC # BLD AUTO: 0 K/UL
NRBC BLD-RTO: 0 /100 WBC
PLATELET # BLD AUTO: 100 K/UL (ref 164–446)
PMV BLD AUTO: 11.5 FL (ref 9–12.9)
POTASSIUM SERPL-SCNC: 3.7 MMOL/L (ref 3.6–5.5)
PTH-INTACT SERPL-MCNC: 17 PG/ML (ref 14–72)
RBC # BLD AUTO: 4.21 M/UL (ref 4.2–5.4)
SODIUM SERPL-SCNC: 132 MMOL/L (ref 135–145)
WBC # BLD AUTO: 6.3 K/UL (ref 4.8–10.8)

## 2021-04-16 PROCEDURE — 700111 HCHG RX REV CODE 636 W/ 250 OVERRIDE (IP): Performed by: FAMILY MEDICINE

## 2021-04-16 PROCEDURE — 83970 ASSAY OF PARATHORMONE: CPT

## 2021-04-16 PROCEDURE — 700105 HCHG RX REV CODE 258: Performed by: FAMILY MEDICINE

## 2021-04-16 PROCEDURE — 36415 COLL VENOUS BLD VENIPUNCTURE: CPT

## 2021-04-16 PROCEDURE — A9270 NON-COVERED ITEM OR SERVICE: HCPCS | Performed by: STUDENT IN AN ORGANIZED HEALTH CARE EDUCATION/TRAINING PROGRAM

## 2021-04-16 PROCEDURE — 700111 HCHG RX REV CODE 636 W/ 250 OVERRIDE (IP): Performed by: STUDENT IN AN ORGANIZED HEALTH CARE EDUCATION/TRAINING PROGRAM

## 2021-04-16 PROCEDURE — 700102 HCHG RX REV CODE 250 W/ 637 OVERRIDE(OP): Performed by: FAMILY MEDICINE

## 2021-04-16 PROCEDURE — 770006 HCHG ROOM/CARE - MED/SURG/GYN SEMI*

## 2021-04-16 PROCEDURE — 80048 BASIC METABOLIC PNL TOTAL CA: CPT

## 2021-04-16 PROCEDURE — A9270 NON-COVERED ITEM OR SERVICE: HCPCS | Performed by: FAMILY MEDICINE

## 2021-04-16 PROCEDURE — 85025 COMPLETE CBC W/AUTO DIFF WBC: CPT

## 2021-04-16 PROCEDURE — 700102 HCHG RX REV CODE 250 W/ 637 OVERRIDE(OP): Performed by: STUDENT IN AN ORGANIZED HEALTH CARE EDUCATION/TRAINING PROGRAM

## 2021-04-16 PROCEDURE — 99233 SBSQ HOSP IP/OBS HIGH 50: CPT | Performed by: FAMILY MEDICINE

## 2021-04-16 PROCEDURE — 82962 GLUCOSE BLOOD TEST: CPT

## 2021-04-16 PROCEDURE — 83735 ASSAY OF MAGNESIUM: CPT

## 2021-04-16 RX ORDER — BISACODYL 10 MG
10 SUPPOSITORY, RECTAL RECTAL
Status: DISCONTINUED | OUTPATIENT
Start: 2021-04-16 | End: 2021-04-21 | Stop reason: HOSPADM

## 2021-04-16 RX ORDER — AMOXICILLIN 250 MG
2 CAPSULE ORAL 2 TIMES DAILY
Status: DISCONTINUED | OUTPATIENT
Start: 2021-04-16 | End: 2021-04-21 | Stop reason: HOSPADM

## 2021-04-16 RX ORDER — POLYETHYLENE GLYCOL 3350 17 G/17G
1 POWDER, FOR SOLUTION ORAL
Status: DISCONTINUED | OUTPATIENT
Start: 2021-04-16 | End: 2021-04-21 | Stop reason: HOSPADM

## 2021-04-16 RX ADMIN — POTASSIUM CHLORIDE 20 MEQ: 1500 TABLET, EXTENDED RELEASE ORAL at 04:45

## 2021-04-16 RX ADMIN — CEFEPIME 2 G: 2 INJECTION, POWDER, FOR SOLUTION INTRAVENOUS at 04:45

## 2021-04-16 RX ADMIN — OMEPRAZOLE 20 MG: 20 CAPSULE, DELAYED RELEASE ORAL at 04:45

## 2021-04-16 RX ADMIN — METRONIDAZOLE 500 MG: 500 TABLET ORAL at 13:25

## 2021-04-16 RX ADMIN — OXYCODONE 10 MG: 5 TABLET ORAL at 04:45

## 2021-04-16 RX ADMIN — METRONIDAZOLE 500 MG: 500 TABLET ORAL at 04:45

## 2021-04-16 RX ADMIN — ONDANSETRON 4 MG: 4 TABLET, ORALLY DISINTEGRATING ORAL at 19:50

## 2021-04-16 RX ADMIN — LORAZEPAM 0.5 MG: 1 TABLET ORAL at 04:45

## 2021-04-16 RX ADMIN — DOCUSATE SODIUM 50 MG AND SENNOSIDES 8.6 MG 2 TABLET: 8.6; 5 TABLET, FILM COATED ORAL at 17:44

## 2021-04-16 RX ADMIN — OXYCODONE 10 MG: 5 TABLET ORAL at 17:45

## 2021-04-16 RX ADMIN — OXYCODONE 10 MG: 5 TABLET ORAL at 08:40

## 2021-04-16 RX ADMIN — OXYCODONE 10 MG: 5 TABLET ORAL at 13:25

## 2021-04-16 RX ADMIN — CEFEPIME 2 G: 2 INJECTION, POWDER, FOR SOLUTION INTRAVENOUS at 17:39

## 2021-04-16 RX ADMIN — METRONIDAZOLE 500 MG: 500 TABLET ORAL at 19:50

## 2021-04-16 RX ADMIN — LORAZEPAM 0.5 MG: 1 TABLET ORAL at 17:40

## 2021-04-16 RX ADMIN — LEVOTHYROXINE SODIUM 100 MCG: 0.05 TABLET ORAL at 04:45

## 2021-04-16 ASSESSMENT — PAIN DESCRIPTION - PAIN TYPE
TYPE: ACUTE PAIN

## 2021-04-16 ASSESSMENT — ENCOUNTER SYMPTOMS
NERVOUS/ANXIOUS: 0
SENSORY CHANGE: 0
DIZZINESS: 0
VOMITING: 0
STRIDOR: 0
FEVER: 1
MYALGIAS: 0
BLURRED VISION: 0
PALPITATIONS: 0
DEPRESSION: 1
NECK PAIN: 0
DOUBLE VISION: 0
CONSTIPATION: 1
COUGH: 0
SHORTNESS OF BREATH: 0
CHILLS: 0
FOCAL WEAKNESS: 0
WHEEZING: 0
NERVOUS/ANXIOUS: 1
DIARRHEA: 0
HEARTBURN: 0
NAUSEA: 1
DIAPHORESIS: 0
BACK PAIN: 0
ABDOMINAL PAIN: 1
SORE THROAT: 0
ORTHOPNEA: 0
BRUISES/BLEEDS EASILY: 0
WEAKNESS: 0
HEADACHES: 0
SPEECH CHANGE: 0

## 2021-04-16 NOTE — PROGRESS NOTES
[facility] Medicine Daily Progress Note    Date of Service  4/16/2021    Chief Complaint  56 y.o. female admitted 4/11/2021 with pelvic mass.    Hospital Course  Admitted with pelvic mass, known history of non-Hodgkin's lymphoma.    Interval Problem Update  Pelvis mass - T-max of 101.8, pathology results pending, cultures negative so far  Diabetes - -130    Consultants/Specialty  Surgery  Oncology    Code Status  Full Code    Disposition  TBD     Review of Systems  Review of Systems   Constitutional: Positive for fever. Negative for chills, diaphoresis and malaise/fatigue.   HENT: Negative for congestion, hearing loss and sore throat.    Eyes: Negative for blurred vision and double vision.   Respiratory: Negative for cough, shortness of breath and wheezing.    Cardiovascular: Negative for chest pain, palpitations and leg swelling.   Gastrointestinal: Positive for abdominal pain and nausea. Negative for diarrhea, heartburn and vomiting.   Genitourinary: Negative for dysuria and frequency.   Musculoskeletal: Negative for back pain, joint pain, myalgias and neck pain.   Skin: Negative for itching and rash.   Neurological: Negative for dizziness, sensory change, speech change, focal weakness, weakness and headaches.   Endo/Heme/Allergies: Negative for environmental allergies. Does not bruise/bleed easily.   Psychiatric/Behavioral: The patient is not nervous/anxious.         Physical Exam  Temp:  [36.3 °C (97.4 °F)-38.8 °C (101.8 °F)] 37.4 °C (99.3 °F)  Pulse:  [] 101  Resp:  [15-16] 16  BP: ()/(40-65) 123/65  SpO2:  [90 %-98 %] 98 %    Physical Exam  Vitals and nursing note reviewed.   HENT:      Head: Normocephalic and atraumatic.      Nose: No congestion.      Mouth/Throat:      Mouth: Mucous membranes are moist.   Eyes:      General: No scleral icterus.     Extraocular Movements: Extraocular movements intact.      Conjunctiva/sclera: Conjunctivae normal.      Pupils: Pupils are equal, round, and  reactive to light.   Cardiovascular:      Rate and Rhythm: Normal rate and regular rhythm.   Pulmonary:      Effort: Pulmonary effort is normal.      Breath sounds: Normal breath sounds.   Abdominal:      General: Bowel sounds are normal. There is no distension.      Palpations: Abdomen is soft.      Tenderness: There is abdominal tenderness (Suprapubic). There is no guarding or rebound.   Musculoskeletal:      Cervical back: No tenderness.      Right lower leg: No edema.      Left lower leg: No edema.   Skin:     General: Skin is warm and dry.   Neurological:      General: No focal deficit present.      Mental Status: She is alert and oriented to person, place, and time.      Cranial Nerves: No cranial nerve deficit.         Fluids    Intake/Output Summary (Last 24 hours) at 4/16/2021 1315  Last data filed at 4/16/2021 0815  Gross per 24 hour   Intake 4497.42 ml   Output --   Net 4497.42 ml       Laboratory  Recent Labs     04/14/21  0036 04/15/21  0015 04/16/21  0611   WBC 7.6 10.2 6.3   RBC 3.71* 3.78* 4.21   HEMOGLOBIN 11.0* 11.0* 12.6   HEMATOCRIT 32.0* 33.4* 40.4   MCV 86.3 88.4 96.0   MCH 29.6 29.1 29.9   MCHC 34.4 32.9* 31.2*   RDW 40.6 42.3 47.9   PLATELETCT 117* 131* 100*   MPV 9.8 9.9 11.5     Recent Labs     04/14/21  0036 04/15/21  0015 04/16/21  0611   SODIUM 134* 133* 132*   POTASSIUM 3.4* 3.7 3.7   CHLORIDE 101 97 98   CO2 24 23 21   GLUCOSE 181* 167* 117*   BUN 9 9 8   CREATININE 0.86 1.03 0.77   CALCIUM 8.5 8.4* 8.7                   Imaging  IR-US GUIDED PIV   Final Result    Ultrasound-guided PERIPHERAL IV INSERTION performed by    qualified nursing staff as above.      CT-NEEDLE BX-ABD-RETROPERITONL   Final Result      1.  CT GUIDED Right RETROPERITONEAL BIOPSY.      OUTSIDE IMAGES-CT ABDOMEN /PELVIS   Final Result      OUTSIDE IMAGES-DX CHEST   Final Result           Assessment/Plan  * Other intra-abdominal and pelvic swelling, mass and lump- (present on admission)  Assessment & Plan  IV  Cefepime, Flagyl  Follow culture and pathology results    Non-Hodgkin lymphoma (HCC)- (present on admission)  Assessment & Plan  Oncology following    Sepsis (HCC)- (present on admission)  Assessment & Plan  This is Sepsis Present on admission  SIRS criteria identified on my evaluation include: Fever, with temperature greater than 101 deg F and Tachycardia, with heart rate greater than 90 BPM  Source -abdominal/pelvic abscess?  Sepsis protocol initiated  Fluid resuscitation ordered per protocol  IV antibiotics as appropriate for source of sepsis  While organ dysfunction may be noted elsewhere in this problem list or in the chart, degree of organ dysfunction does not meet CMS criteria for severe sepsis  Monitor       Hypomagnesemia- (present on admission)  Assessment & Plan  IV Mg given  Follow level    Hypokalemia- (present on admission)  Assessment & Plan  Kdur, follow BMP    Normocytic anemia- (present on admission)  Assessment & Plan  Follow cbc    Thrombocytopenia (HCC)- (present on admission)  Assessment & Plan  Follow CBC     Type 2 diabetes mellitus with hyperglycemia, with long-term current use of insulin (HCC)- (present on admission)  Assessment & Plan  SSI    History of DVT (deep vein thrombosis)- (present on admission)  Assessment & Plan  Hold Xarelto    Hypothyroidism- (present on admission)  Assessment & Plan  Synthroid       VTE prophylaxis: SCD

## 2021-04-16 NOTE — PROGRESS NOTES
Assessment complete.  A&O x 4. Patient calls appropriately. Fatigued this morning.   Patient ambulates with SBA assist.  Patient has 4/10 pain. Pain managed with prescribed medications.  Denies N&V. Tolerating regular diet, although very low appetite   Bone biopsy site with gauze/ tegaderm.  + void, + flatus, PTA BM.  Patient denies SOB.  SCD's on.    Review plan with of care with patient. Call light and personal belongings with in reach. Hourly rounding in place. All needs met at this time.

## 2021-04-16 NOTE — PROGRESS NOTES
Oncology/Hematology Progress Note               Author: Daphne Sue M.D. Date & Time created: 4/15/2021  5:12 PM   Diagnosis-?  Relapsed follicular non-Hodgkin's lymphoma presenting as pain abdomen and pelvic mass  Interval History:  Abdominal is much better.    No fevers or chills.  Continues to be on antibiotics and metronidazole.  For biopsy today  Review of Systems:  Review of Systems   Constitutional: Positive for fever and malaise/fatigue.   HENT: Negative for hearing loss and sore throat.    Respiratory: Negative for stridor.    Cardiovascular: Negative for chest pain, palpitations and orthopnea.   Gastrointestinal: Positive for abdominal pain, constipation and nausea. Negative for heartburn.   Genitourinary: Negative for dysuria and hematuria.   Musculoskeletal: Negative for back pain, myalgias and neck pain.   Psychiatric/Behavioral: Positive for depression. The patient is nervous/anxious.        Physical Exam:  Physical Exam  Constitutional:       Appearance: Normal appearance.   Cardiovascular:      Rate and Rhythm: Normal rate and regular rhythm.   Pulmonary:      Effort: Pulmonary effort is normal. No respiratory distress.      Breath sounds: Normal breath sounds. No stridor. No wheezing or rhonchi.   Abdominal:      General: Abdomen is flat.      Tenderness: There is abdominal tenderness. There is no rebound.   Musculoskeletal:         General: No swelling or deformity. Normal range of motion.   Skin:     General: Skin is warm.      Coloration: Skin is not jaundiced.      Findings: No bruising.   Neurological:      General: No focal deficit present.      Mental Status: She is alert and oriented to person, place, and time.         Labs:          Recent Labs     04/13/21  0025 04/14/21  0036 04/15/21  0015   SODIUM 134* 134* 133*   POTASSIUM 3.3* 3.4* 3.7   CHLORIDE 102 101 97   CO2 22 24 23   BUN 11 9 9   CREATININE 0.96 0.86 1.03   MAGNESIUM  --  1.8 2.0   PHOSPHORUS  --  2.8  --    CALCIUM 8.4* 8.5  8.4*     Recent Labs     21  0025 21  0036 04/15/21  0015   GLUCOSE 169* 181* 167*     Recent Labs     215 216 04/15/21  0015   RBC 3.78* 3.71* 3.78*   HEMOGLOBIN 11.1* 11.0* 11.0*   HEMATOCRIT 33.3* 32.0* 33.4*   PLATELETCT 111* 117* 131*     Recent Labs     04/13/21  0025 04/14/21  0036 04/15/21  001   WBC 6.9 7.6 10.2   NEUTSPOLYS  --  88.30* 89.90*   LYMPHOCYTES  --  2.40* 1.60*   MONOCYTES  --  6.60 5.80   EOSINOPHILS  --  1.30 0.30   BASOPHILS  --  0.50 0.20     Recent Labs     04/13/21  0025 04/14/21  0036 04/15/21  001   SODIUM 134* 134* 133*   POTASSIUM 3.3* 3.4* 3.7   CHLORIDE 102 101 97   CO2 22 24 23   GLUCOSE 169* 181* 167*   BUN 11 9 9   CREATININE 0.96 0.86 1.03   CALCIUM 8.4* 8.5 8.4*     Hemodynamics:  Temp (24hrs), Av.3 °C (99.1 °F), Min:36.1 °C (97 °F), Max:39.3 °C (102.7 °F)  Temperature: 36.3 °C (97.4 °F)  Pulse  Av.3  Min: 68  Max: 113   Blood Pressure: 149/60     Respiratory:    Respiration: 16, Pulse Oximetry: 90 %     Work Of Breathing / Effort: Shallow  RUL Breath Sounds: Clear, RML Breath Sounds: Clear, RLL Breath Sounds: Diminished, ALFONZO Breath Sounds: Clear, LLL Breath Sounds: Diminished  Fluids:    Intake/Output Summary (Last 24 hours) at 2021 1345  Last data filed at 2021 0900  Gross per 24 hour   Intake 100 ml   Output --   Net 100 ml        GI/Nutrition:  Orders Placed This Encounter   Procedures   • Diet Order Diet: Consistent CHO (Diabetic)     Standing Status:   Standing     Number of Occurrences:   1     Order Specific Question:   Diet:     Answer:   Consistent CHO (Diabetic) [4]     Medical Decision Making, by Problem:  Active Hospital Problems    Diagnosis    • *Other intra-abdominal and pelvic swelling, mass and lump [R19.09]    • Sepsis (HCC) [A41.9]    • Non-Hodgkin lymphoma (HCC) [C85.90]    • Hypokalemia [E87.6]    • Normocytic anemia [D64.9]    • Thrombocytopenia (HCC) [D69.6]    • History of DVT (deep vein thrombosis)  [Z86.718]    • Type 2 diabetes mellitus with hyperglycemia, with long-term current use of insulin (HCC) [E11.65, Z79.4]    • Hypothyroidism [E03.9]        Plan:  Follicular non-Hodgkin's lymphoma-awaiting biopsy results.  Further management will depend upon the biopsy results.  Pain abdomen-much better controlled.  Continue present management.  Continue cefepime and metronidazole.  We will follow-up  DVT-On Xarelto  Fever-Cultures pending  Quality-Core Measures

## 2021-04-16 NOTE — PROGRESS NOTES
Oncology/Hematology Progress Note               Author: Daphne Sue M.D. Date & Time created: 4/16/2021  2:00 PM   Diagnosis-?  Relapsed follicular non-Hodgkin's lymphoma presenting as pain abdomen and pelvic mass  Interval History:  Abdominal is stable.    No fevers or chills.  Continues to be on antibiotics and metronidazole.  Hep C results are pending  Review of Systems:  Review of Systems   Constitutional: Positive for fever and malaise/fatigue.   HENT: Negative for hearing loss and sore throat.    Respiratory: Negative for stridor.    Cardiovascular: Negative for chest pain, palpitations and orthopnea.   Gastrointestinal: Positive for abdominal pain, constipation and nausea. Negative for heartburn.   Genitourinary: Negative for dysuria and hematuria.   Musculoskeletal: Negative for back pain, myalgias and neck pain.   Psychiatric/Behavioral: Negative for depression. The patient is nervous/anxious.        Physical Exam:  Physical Exam  Constitutional:       Appearance: Normal appearance.   Cardiovascular:      Rate and Rhythm: Normal rate and regular rhythm.   Pulmonary:      Effort: Pulmonary effort is normal. No respiratory distress.      Breath sounds: Normal breath sounds. No stridor. No wheezing or rhonchi.   Abdominal:      General: Abdomen is flat.      Tenderness: There is abdominal tenderness. There is no rebound.   Musculoskeletal:         General: No swelling or deformity. Normal range of motion.   Skin:     General: Skin is warm.      Coloration: Skin is pale. Skin is not jaundiced.      Findings: No bruising.   Neurological:      General: No focal deficit present.      Mental Status: She is alert and oriented to person, place, and time.      Cranial Nerves: No cranial nerve deficit.      Sensory: No sensory deficit.         Labs:          Recent Labs     04/14/21  0036 04/15/21  0015 04/16/21  0611   SODIUM 134* 133* 132*   POTASSIUM 3.4* 3.7 3.7   CHLORIDE 101 97 98   CO2 24 23 21   BUN 9 9 8    CREATININE 0.86 1.03 0.77   MAGNESIUM 1.8 2.0 2.1   PHOSPHORUS 2.8  --   --    CALCIUM 8.5 8.4* 8.7     Recent Labs     04/14/21  0036 04/15/21  0015 04/16/21  0611   GLUCOSE 181* 167* 117*     Recent Labs     04/14/21  0036 04/15/21  0015 04/16/21  0611   RBC 3.71* 3.78* 4.21   HEMOGLOBIN 11.0* 11.0* 12.6   HEMATOCRIT 32.0* 33.4* 40.4   PLATELETCT 117* 131* 100*     Recent Labs     04/14/21  0036 04/15/21  0015 04/16/21  0611   WBC 7.6 10.2 6.3   NEUTSPOLYS 88.30* 89.90* 89.70*   LYMPHOCYTES 2.40* 1.60* 2.90*   MONOCYTES 6.60 5.80 4.90   EOSINOPHILS 1.30 0.30 0.60   BASOPHILS 0.50 0.20 0.30     Recent Labs     04/14/21  0036 04/15/21  0015 04/16/21  0611   SODIUM 134* 133* 132*   POTASSIUM 3.4* 3.7 3.7   CHLORIDE 101 97 98   CO2 24 23 21   GLUCOSE 181* 167* 117*   BUN 9 9 8   CREATININE 0.86 1.03 0.77   CALCIUM 8.5 8.4* 8.7     Hemodynamics:  Temp (24hrs), Av.3 °C (99.1 °F), Min:36.3 °C (97.4 °F), Max:38.8 °C (101.8 °F)  Temperature: 37.2 °C (99 °F)  Pulse  Av.4  Min: 68  Max: 113   Blood Pressure: 123/65     Respiratory:    Respiration: 16, Pulse Oximetry: 98 %     Work Of Breathing / Effort: Shallow  RUL Breath Sounds: Clear, RML Breath Sounds: Clear, RLL Breath Sounds: Diminished, ALFONZO Breath Sounds: Clear, LLL Breath Sounds: Diminished  Fluids:    Intake/Output Summary (Last 24 hours) at 2021 1345  Last data filed at 2021 0900  Gross per 24 hour   Intake 100 ml   Output --   Net 100 ml     Weight: 81.9 kg (180 lb 8.9 oz)  GI/Nutrition:  Orders Placed This Encounter   Procedures   • Diet Order Diet: Consistent CHO (Diabetic)     Standing Status:   Standing     Number of Occurrences:   1     Order Specific Question:   Diet:     Answer:   Consistent CHO (Diabetic) [4]     Medical Decision Making, by Problem:  Active Hospital Problems    Diagnosis    • *Other intra-abdominal and pelvic swelling, mass and lump [R19.09]    • Sepsis (HCC) [A41.9]    • Non-Hodgkin lymphoma (HCC) [C85.90]    •  Hypokalemia [E87.6]    • Normocytic anemia [D64.9]    • Thrombocytopenia (HCC) [D69.6]    • History of DVT (deep vein thrombosis) [Z86.718]    • Type 2 diabetes mellitus with hyperglycemia, with long-term current use of insulin (HCC) [E11.65, Z79.4]    • Hypothyroidism [E03.9]        Plan:  Follicular non-Hodgkin's lymphoma-awaiting biopsy results.  Further management will depend upon the biopsy results.  Pain abdomen-better controlled.  Continue present management.  Continue cefepime and metronidazole.  We will follow-up  DVT-On Xarelto  Fever-Cultures negative so far  Quality-Core Measures

## 2021-04-16 NOTE — CARE PLAN
Problem: Infection  Goal: Will remain free from infection  Outcome: PROGRESSING AS EXPECTED  Note: Patient intermittently febrile, medicated with prescribed PRN tylenol, patient lactic acid shows improvement with decrease. Continuous fluids running, antibiotics in use.      Problem: Pain Management  Goal: Pain level will decrease to patient's comfort goal  Outcome: PROGRESSING AS EXPECTED  Note: Patients pain is managed with prescribed medications and alternative interventions, patient reports effective interventions, patient able to rest and sleep comfortably intermittently throughout the night

## 2021-04-16 NOTE — PROGRESS NOTES
Pt A&Ox4     Patient uses call light appropriately to call for assistance      Patient rates pain as 8/10, medicated per MAR, pain managed with prescribed medications and alternative interventions      Tolerating regular diet, denies n/v. +bowel sounds, +flatus, LBM PTA      Saturating >90% on 2L O2 via NC   Patient denies SOB      Bone biopsy site to lower abdomen   Covered with gauze and Tegaderm CDI      Pt ambulates with standby assistance and a steady gait. Patient does have some general weakness, patient educated on the importance of calling before she gets up, patient verbalizes understanding      Updated on plan of care. Safety education provided. Bed locked in low. Call light within reach. Rounding in place.

## 2021-04-16 NOTE — CARE PLAN
Problem: Pain Management  Goal: Pain level will decrease to patient's comfort goal  Outcome: PROGRESSING AS EXPECTED  Note: Pt medicated per MAR for pain.      Problem: Respiratory:  Goal: Respiratory status will improve  Outcome: PROGRESSING AS EXPECTED  Note: Pt requiring 1L o2 intermittently. Pt encouraged to ambulate, cough, turn, deep breath and use IS.

## 2021-04-17 LAB
ANION GAP SERPL CALC-SCNC: 14 MMOL/L (ref 7–16)
BACTERIA BLD CULT: NORMAL
BACTERIA BLD CULT: NORMAL
BASOPHILS # BLD AUTO: 0.2 % (ref 0–1.8)
BASOPHILS # BLD: 0.02 K/UL (ref 0–0.12)
BUN SERPL-MCNC: 7 MG/DL (ref 8–22)
CALCIUM SERPL-MCNC: 8.4 MG/DL (ref 8.5–10.5)
CHLORIDE SERPL-SCNC: 99 MMOL/L (ref 96–112)
CO2 SERPL-SCNC: 23 MMOL/L (ref 20–33)
CREAT SERPL-MCNC: 0.8 MG/DL (ref 0.5–1.4)
EOSINOPHIL # BLD AUTO: 0.07 K/UL (ref 0–0.51)
EOSINOPHIL NFR BLD: 0.8 % (ref 0–6.9)
ERYTHROCYTE [DISTWIDTH] IN BLOOD BY AUTOMATED COUNT: 44 FL (ref 35.9–50)
GLUCOSE BLD-MCNC: 117 MG/DL (ref 65–99)
GLUCOSE BLD-MCNC: 133 MG/DL (ref 65–99)
GLUCOSE BLD-MCNC: 151 MG/DL (ref 65–99)
GLUCOSE BLD-MCNC: 152 MG/DL (ref 65–99)
GLUCOSE BLD-MCNC: 158 MG/DL (ref 65–99)
GLUCOSE SERPL-MCNC: 139 MG/DL (ref 65–99)
HCT VFR BLD AUTO: 33.4 % (ref 37–47)
HGB BLD-MCNC: 10.9 G/DL (ref 12–16)
IMM GRANULOCYTES # BLD AUTO: 0.12 K/UL (ref 0–0.11)
IMM GRANULOCYTES NFR BLD AUTO: 1.4 % (ref 0–0.9)
LYMPHOCYTES # BLD AUTO: 0.17 K/UL (ref 1–4.8)
LYMPHOCYTES NFR BLD: 1.9 % (ref 22–41)
MAGNESIUM SERPL-MCNC: 1.9 MG/DL (ref 1.5–2.5)
MCH RBC QN AUTO: 29.3 PG (ref 27–33)
MCHC RBC AUTO-ENTMCNC: 32.6 G/DL (ref 33.6–35)
MCV RBC AUTO: 89.8 FL (ref 81.4–97.8)
MONOCYTES # BLD AUTO: 0.39 K/UL (ref 0–0.85)
MONOCYTES NFR BLD AUTO: 4.5 % (ref 0–13.4)
NEUTROPHILS # BLD AUTO: 7.97 K/UL (ref 2–7.15)
NEUTROPHILS NFR BLD: 91.2 % (ref 44–72)
NRBC # BLD AUTO: 0 K/UL
NRBC BLD-RTO: 0 /100 WBC
PLATELET # BLD AUTO: 138 K/UL (ref 164–446)
PMV BLD AUTO: 10.3 FL (ref 9–12.9)
POTASSIUM SERPL-SCNC: 3.5 MMOL/L (ref 3.6–5.5)
RBC # BLD AUTO: 3.72 M/UL (ref 4.2–5.4)
SIGNIFICANT IND 70042: NORMAL
SIGNIFICANT IND 70042: NORMAL
SITE SITE: NORMAL
SITE SITE: NORMAL
SODIUM SERPL-SCNC: 136 MMOL/L (ref 135–145)
SOURCE SOURCE: NORMAL
SOURCE SOURCE: NORMAL
WBC # BLD AUTO: 8.7 K/UL (ref 4.8–10.8)

## 2021-04-17 PROCEDURE — 770006 HCHG ROOM/CARE - MED/SURG/GYN SEMI*

## 2021-04-17 PROCEDURE — 80048 BASIC METABOLIC PNL TOTAL CA: CPT

## 2021-04-17 PROCEDURE — 700111 HCHG RX REV CODE 636 W/ 250 OVERRIDE (IP): Performed by: FAMILY MEDICINE

## 2021-04-17 PROCEDURE — 700102 HCHG RX REV CODE 250 W/ 637 OVERRIDE(OP): Performed by: FAMILY MEDICINE

## 2021-04-17 PROCEDURE — A9270 NON-COVERED ITEM OR SERVICE: HCPCS | Performed by: FAMILY MEDICINE

## 2021-04-17 PROCEDURE — 36415 COLL VENOUS BLD VENIPUNCTURE: CPT

## 2021-04-17 PROCEDURE — 99233 SBSQ HOSP IP/OBS HIGH 50: CPT | Performed by: FAMILY MEDICINE

## 2021-04-17 PROCEDURE — 700102 HCHG RX REV CODE 250 W/ 637 OVERRIDE(OP): Performed by: STUDENT IN AN ORGANIZED HEALTH CARE EDUCATION/TRAINING PROGRAM

## 2021-04-17 PROCEDURE — 82962 GLUCOSE BLOOD TEST: CPT | Mod: 91

## 2021-04-17 PROCEDURE — 700105 HCHG RX REV CODE 258: Performed by: FAMILY MEDICINE

## 2021-04-17 PROCEDURE — A9270 NON-COVERED ITEM OR SERVICE: HCPCS | Performed by: STUDENT IN AN ORGANIZED HEALTH CARE EDUCATION/TRAINING PROGRAM

## 2021-04-17 PROCEDURE — 83735 ASSAY OF MAGNESIUM: CPT

## 2021-04-17 PROCEDURE — 85025 COMPLETE CBC W/AUTO DIFF WBC: CPT

## 2021-04-17 RX ORDER — MAGNESIUM SULFATE HEPTAHYDRATE 40 MG/ML
2 INJECTION, SOLUTION INTRAVENOUS ONCE
Status: COMPLETED | OUTPATIENT
Start: 2021-04-17 | End: 2021-04-17

## 2021-04-17 RX ORDER — POTASSIUM CHLORIDE 20 MEQ/1
20 TABLET, EXTENDED RELEASE ORAL 2 TIMES DAILY
Status: DISCONTINUED | OUTPATIENT
Start: 2021-04-17 | End: 2021-04-18

## 2021-04-17 RX ADMIN — SODIUM CHLORIDE: 9 INJECTION, SOLUTION INTRAVENOUS at 19:52

## 2021-04-17 RX ADMIN — MAGNESIUM SULFATE 2 G: 2 INJECTION INTRAVENOUS at 09:06

## 2021-04-17 RX ADMIN — METRONIDAZOLE 500 MG: 500 TABLET ORAL at 04:52

## 2021-04-17 RX ADMIN — INSULIN HUMAN 1 UNITS: 100 INJECTION, SOLUTION PARENTERAL at 23:11

## 2021-04-17 RX ADMIN — DOCUSATE SODIUM 50 MG AND SENNOSIDES 8.6 MG 2 TABLET: 8.6; 5 TABLET, FILM COATED ORAL at 18:28

## 2021-04-17 RX ADMIN — OXYCODONE 5 MG: 5 TABLET ORAL at 01:27

## 2021-04-17 RX ADMIN — POTASSIUM CHLORIDE 20 MEQ: 1500 TABLET, EXTENDED RELEASE ORAL at 18:28

## 2021-04-17 RX ADMIN — LORAZEPAM 0.5 MG: 1 TABLET ORAL at 04:52

## 2021-04-17 RX ADMIN — OXYCODONE 5 MG: 5 TABLET ORAL at 23:15

## 2021-04-17 RX ADMIN — CEFEPIME 2 G: 2 INJECTION, POWDER, FOR SOLUTION INTRAVENOUS at 18:29

## 2021-04-17 RX ADMIN — METRONIDAZOLE 500 MG: 500 TABLET ORAL at 23:08

## 2021-04-17 RX ADMIN — LORAZEPAM 0.5 MG: 1 TABLET ORAL at 18:29

## 2021-04-17 RX ADMIN — POTASSIUM CHLORIDE 20 MEQ: 1500 TABLET, EXTENDED RELEASE ORAL at 04:52

## 2021-04-17 RX ADMIN — DOCUSATE SODIUM 50 MG AND SENNOSIDES 8.6 MG 2 TABLET: 8.6; 5 TABLET, FILM COATED ORAL at 04:52

## 2021-04-17 RX ADMIN — OXYCODONE 5 MG: 5 TABLET ORAL at 16:18

## 2021-04-17 RX ADMIN — POLYETHYLENE GLYCOL 3350 1 PACKET: 17 POWDER, FOR SOLUTION ORAL at 05:10

## 2021-04-17 RX ADMIN — LEVOTHYROXINE SODIUM 100 MCG: 0.05 TABLET ORAL at 04:52

## 2021-04-17 RX ADMIN — METRONIDAZOLE 500 MG: 500 TABLET ORAL at 16:29

## 2021-04-17 RX ADMIN — OMEPRAZOLE 20 MG: 20 CAPSULE, DELAYED RELEASE ORAL at 04:52

## 2021-04-17 RX ADMIN — CEFEPIME 2 G: 2 INJECTION, POWDER, FOR SOLUTION INTRAVENOUS at 04:54

## 2021-04-17 RX ADMIN — ENOXAPARIN SODIUM 40 MG: 40 INJECTION SUBCUTANEOUS at 11:58

## 2021-04-17 RX ADMIN — SODIUM CHLORIDE: 9 INJECTION, SOLUTION INTRAVENOUS at 04:57

## 2021-04-17 ASSESSMENT — ENCOUNTER SYMPTOMS
STRIDOR: 0
WEAKNESS: 0
SPEECH CHANGE: 0
CONSTIPATION: 1
NAUSEA: 1
DIAPHORESIS: 0
DEPRESSION: 0
SORE THROAT: 0
NERVOUS/ANXIOUS: 1
CHILLS: 0
TINGLING: 0
ORTHOPNEA: 0
BRUISES/BLEEDS EASILY: 0
FEVER: 1
FOCAL WEAKNESS: 0
NERVOUS/ANXIOUS: 0
DIARRHEA: 0
COUGH: 0
SHORTNESS OF BREATH: 0
NECK PAIN: 0
NAUSEA: 0
DOUBLE VISION: 0
HEADACHES: 0
HEARTBURN: 0
PALPITATIONS: 0
BLURRED VISION: 0
VOMITING: 0
ABDOMINAL PAIN: 1
WHEEZING: 0
MYALGIAS: 0
SENSORY CHANGE: 0
DIZZINESS: 0
BACK PAIN: 0

## 2021-04-17 ASSESSMENT — PAIN DESCRIPTION - PAIN TYPE
TYPE: ACUTE PAIN

## 2021-04-17 NOTE — PROGRESS NOTES
[facility] Medicine Daily Progress Note    Date of Service  4/17/2021    Chief Complaint  56 y.o. female admitted 4/11/2021 with pelvic mass.    Hospital Course  Admitted with pelvic mass, known history of non-Hodgkin's lymphoma.    Interval Problem Update  Pelvis mass - T-max 99.2, pathology showed several small fibrous course with areas of necrosis and area of acute inflammation, No lymph node tissue identified, No unequivocal malignancy identified.  Discussed case with Oncology  Diabetes - -130  Low potassium and magnesium    Updates given and plan of care discussed with patient's spouse who was at bedside.  Patient is allergic to iodine contrast.  Explained that we need better imaging to determine continuing plan of care.  Patient and spouse do not recall allergy to MRI contrast.  But patient extremely claustrophobic and requires to be under full anesthesia to get the MRI done.    Consultants/Specialty  Surgery  Oncology    Code Status  Full Code    Disposition  TBD     Review of Systems  Review of Systems   Constitutional: Positive for fever. Negative for chills, diaphoresis and malaise/fatigue.   HENT: Negative for congestion, hearing loss and sore throat.    Eyes: Negative for blurred vision and double vision.   Respiratory: Negative for cough, shortness of breath and wheezing.    Cardiovascular: Negative for chest pain, palpitations and leg swelling.   Gastrointestinal: Positive for abdominal pain and nausea. Negative for diarrhea, heartburn and vomiting.   Genitourinary: Negative for dysuria and frequency.   Musculoskeletal: Negative for back pain, joint pain, myalgias and neck pain.   Skin: Negative for itching and rash.   Neurological: Negative for dizziness, sensory change, speech change, focal weakness, weakness and headaches.   Endo/Heme/Allergies: Negative for environmental allergies. Does not bruise/bleed easily.   Psychiatric/Behavioral: The patient is not nervous/anxious.         Physical  Exam  Temp:  [36.8 °C (98.2 °F)-37.3 °C (99.2 °F)] 37.3 °C (99.2 °F)  Pulse:  [] 70  Resp:  [16-20] 16  BP: (103-143)/(61-65) 136/61  SpO2:  [90 %-98 %] 90 %    Physical Exam  Vitals and nursing note reviewed.   HENT:      Head: Normocephalic and atraumatic.      Nose: No congestion.      Mouth/Throat:      Mouth: Mucous membranes are moist.   Eyes:      General: No scleral icterus.     Extraocular Movements: Extraocular movements intact.      Conjunctiva/sclera: Conjunctivae normal.      Pupils: Pupils are equal, round, and reactive to light.   Cardiovascular:      Rate and Rhythm: Normal rate and regular rhythm.   Pulmonary:      Effort: Pulmonary effort is normal.      Breath sounds: Normal breath sounds.   Abdominal:      General: Bowel sounds are normal. There is no distension.      Palpations: Abdomen is soft.      Tenderness: There is abdominal tenderness (Suprapubic). There is no guarding or rebound.   Musculoskeletal:      Cervical back: No tenderness.      Right lower leg: No edema.      Left lower leg: No edema.   Skin:     General: Skin is warm and dry.   Neurological:      General: No focal deficit present.      Mental Status: She is alert and oriented to person, place, and time.      Cranial Nerves: No cranial nerve deficit.         Fluids    Intake/Output Summary (Last 24 hours) at 4/17/2021 1105  Last data filed at 4/17/2021 0600  Gross per 24 hour   Intake 2230 ml   Output --   Net 2230 ml       Laboratory  Recent Labs     04/15/21  0015 04/16/21  0611 04/17/21  0340   WBC 10.2 6.3 8.7   RBC 3.78* 4.21 3.72*   HEMOGLOBIN 11.0* 12.6 10.9*   HEMATOCRIT 33.4* 40.4 33.4*   MCV 88.4 96.0 89.8   MCH 29.1 29.9 29.3   MCHC 32.9* 31.2* 32.6*   RDW 42.3 47.9 44.0   PLATELETCT 131* 100* 138*   MPV 9.9 11.5 10.3     Recent Labs     04/15/21  0015 04/16/21  0611 04/17/21  0340   SODIUM 133* 132* 136   POTASSIUM 3.7 3.7 3.5*   CHLORIDE 97 98 99   CO2 23 21 23   GLUCOSE 167* 117* 139*   BUN 9 8 7*    CREATININE 1.03 0.77 0.80   CALCIUM 8.4* 8.7 8.4*                   Imaging  IR-US GUIDED PIV   Final Result    Ultrasound-guided PERIPHERAL IV INSERTION performed by    qualified nursing staff as above.      CT-NEEDLE BX-ABD-RETROPERITONL   Final Result      1.  CT GUIDED Right RETROPERITONEAL BIOPSY.      OUTSIDE IMAGES-CT ABDOMEN /PELVIS   Final Result      OUTSIDE IMAGES-DX CHEST   Final Result      MR-ABDOMEN-WITH & W/O    (Results Pending)   MR-PELVIS WITH    (Results Pending)        Assessment/Plan  * Other intra-abdominal and pelvic swelling, mass and lump- (present on admission)  Assessment & Plan  IV Cefepime, Flagyl  CT-guided right retroperitoneal biopsy 4/14/2021 - Several small fibrous course with areas of necrosis and area of acute inflammation. No lymph node tissue identified. No unequivocal malignancy identified.     Check MRI abdomen and pelvis with contrast - under Anesthesia due to Claustrophobia    Non-Hodgkin lymphoma (HCC)- (present on admission)  Assessment & Plan  Oncology following    Sepsis (HCC)- (present on admission)  Assessment & Plan  This is Sepsis Present on admission  SIRS criteria identified on my evaluation include: Fever, with temperature greater than 101 deg F and Tachycardia, with heart rate greater than 90 BPM  Source -abdominal/pelvic abscess?  Sepsis protocol initiated  Fluid resuscitation ordered per protocol  IV antibiotics as appropriate for source of sepsis  While organ dysfunction may be noted elsewhere in this problem list or in the chart, degree of organ dysfunction does not meet CMS criteria for severe sepsis  Monitor       Hypomagnesemia- (present on admission)  Assessment & Plan  IV Mg 2 g  Follow level    Hypokalemia- (present on admission)  Assessment & Plan  Increase Kdur  follow BMP    Normocytic anemia- (present on admission)  Assessment & Plan  Follow cbc    Thrombocytopenia (HCC)- (present on admission)  Assessment & Plan  Follow CBC     Type 2 diabetes  mellitus with hyperglycemia, with long-term current use of insulin (HCC)- (present on admission)  Assessment & Plan  SSI    History of DVT (deep vein thrombosis)- (present on admission)  Assessment & Plan  Hold Xarelto    Hypothyroidism- (present on admission)  Assessment & Plan  Synthroid       VTE prophylaxis: Lovenox

## 2021-04-17 NOTE — PROGRESS NOTES
Oncology/Hematology Progress Note               Author: Daphne Sue M.D. Date & Time created: 4/17/2021  1:01 PM   Diagnosis-pain abdomen, pelvic mass  Interval History:  Abdominal pain is stable.    No fevers or chills.  Continues to be on antibiotics and metronidazole.  Biopsy results from the pelvic mass do not show relapsed disease.  Review of Systems:  Review of Systems   Constitutional: Positive for fever and malaise/fatigue.   HENT: Negative for hearing loss and sore throat.    Respiratory: Negative for stridor.    Cardiovascular: Negative for chest pain, palpitations and orthopnea.   Gastrointestinal: Positive for abdominal pain and constipation. Negative for heartburn and nausea.   Genitourinary: Negative for dysuria and hematuria.   Musculoskeletal: Negative for back pain, myalgias and neck pain.   Neurological: Negative for dizziness, tingling and headaches.   Psychiatric/Behavioral: Negative for depression. The patient is nervous/anxious.        Physical Exam:  Physical Exam  Constitutional:       Appearance: Normal appearance.   HENT:      Mouth/Throat:      Pharynx: No oropharyngeal exudate or posterior oropharyngeal erythema.   Eyes:      General: No scleral icterus.     Pupils: Pupils are equal, round, and reactive to light.   Cardiovascular:      Rate and Rhythm: Normal rate and regular rhythm.   Pulmonary:      Effort: Pulmonary effort is normal. No respiratory distress.      Breath sounds: Normal breath sounds. No stridor. No wheezing or rhonchi.   Abdominal:      General: Abdomen is flat. There is no distension.      Tenderness: There is abdominal tenderness. There is no rebound.   Musculoskeletal:         General: No swelling or deformity. Normal range of motion.   Skin:     General: Skin is warm.      Coloration: Skin is pale. Skin is not jaundiced.      Findings: No bruising.   Neurological:      General: No focal deficit present.      Mental Status: She is alert and oriented to person,  place, and time.      Cranial Nerves: No cranial nerve deficit.      Sensory: No sensory deficit.         Labs:          Recent Labs     04/15/21  0015 04/16/21  0611 04/17/21  0340   SODIUM 133* 132* 136   POTASSIUM 3.7 3.7 3.5*   CHLORIDE 97 98 99   CO2 23  23   BUN 9 8 7*   CREATININE 1.03 0.77 0.80   MAGNESIUM 2.0 2.1 1.9   CALCIUM 8.4* 8.7 8.4*     Recent Labs     04/15/21  0015 04/16/21  0611 04/17/21  0340   GLUCOSE 167* 117* 139*     Recent Labs     04/15/21  0015 04/16/21  0611 04/17/21  0340   RBC 3.78* 4.21 3.72*   HEMOGLOBIN 11.0* 12.6 10.9*   HEMATOCRIT 33.4* 40.4 33.4*   PLATELETCT 131* 100* 138*     Recent Labs     04/15/21  0015 04/16/21  0611 04/17/21  0340   WBC 10.2 6.3 8.7   NEUTSPOLYS 89.90* 89.70* 91.20*   LYMPHOCYTES 1.60* 2.90* 1.90*   MONOCYTES 5.80 4.90 4.50   EOSINOPHILS 0.30 0.60 0.80   BASOPHILS 0.20 0.30 0.20     Recent Labs     04/15/21  0015 04/16/21  0611 04/17/21  0340   SODIUM 133* 132* 136   POTASSIUM 3.7 3.7 3.5*   CHLORIDE 97 98 99   CO2    GLUCOSE 167* 117* 139*   BUN 9 8 7*   CREATININE 1.03 0.77 0.80   CALCIUM 8.4* 8.7 8.4*     Hemodynamics:  Temp (24hrs), Av.2 °C (98.9 °F), Min:36.8 °C (98.2 °F), Max:37.3 °C (99.2 °F)  Temperature: 37.1 °C (98.8 °F)  Pulse  Av.3  Min: 68  Max: 113   Blood Pressure: 141/59     Respiratory:    Respiration: 18, Pulse Oximetry: 96 %     Work Of Breathing / Effort: Shallow  RUL Breath Sounds: Clear, RML Breath Sounds: Clear, RLL Breath Sounds: Diminished, ALFONZO Breath Sounds: Clear, LLL Breath Sounds: Diminished  Fluids:    Intake/Output Summary (Last 24 hours) at 2021 1345  Last data filed at 2021 0900  Gross per 24 hour   Intake 100 ml   Output --   Net 100 ml        GI/Nutrition:  Orders Placed This Encounter   Procedures   • Diet Order Diet: Regular     Standing Status:   Standing     Number of Occurrences:   1     Order Specific Question:   Diet:     Answer:   Regular [1]     Medical Decision Making, by  Problem:  Active Hospital Problems    Diagnosis    • *Other intra-abdominal and pelvic swelling, mass and lump [R19.09]    • Sepsis (HCC) [A41.9]    • Non-Hodgkin lymphoma (HCC) [C85.90]    • Hypokalemia [E87.6]    • Normocytic anemia [D64.9]    • Thrombocytopenia (HCC) [D69.6]    • History of DVT (deep vein thrombosis) [Z86.718]    • Type 2 diabetes mellitus with hyperglycemia, with long-term current use of insulin (HCC) [E11.65, Z79.4]    • Hypothyroidism [E03.9]        Plan:  Follicular non-Hodgkin's lymphoma-the patient was recently treated for relapsed follicular non-Hodgkin's lymphoma.  Biopsy from this mass is negative.  I discussed the case with Dr. Jain.  Plan at this time is to continue her antibiotics.  There is a possibility that she may need another biopsy.  However likelihood of this mass being infected Is higher since fevers have resolved on antibiotics.  Pain has also improved to some extent  Pain abdomen-better controlled.  Continue present management.  Continue cefepime and metronidazole.  We will follow-up  DVT-On Xarelto  Fever-Cultures negative so far  Quality-Core Measures

## 2021-04-17 NOTE — PROGRESS NOTES
Pt is laying in bed, call light within reach, bed lowered and locked, fall education reinforced. Pt is A&Ox4 and on 1L of oxygen via nasal cannula. Pt lung sounds are diminished in the lower lobes, bowel sounds are normoactive in all four quadrants, heart sounds are within defined limits. PT IV is clean,dry,intact,and patent and infusing the appropriate fluids. Pt is up stand-by to ambulate with a shuffling gait. Pt has a RLQ biopsy site with dry gauze and tegaderm dressing CDI. Pt has poor oral intake but encouraged.

## 2021-04-18 LAB
ANION GAP SERPL CALC-SCNC: 9 MMOL/L (ref 7–16)
BASOPHILS # BLD AUTO: 0.1 % (ref 0–1.8)
BASOPHILS # BLD: 0.01 K/UL (ref 0–0.12)
BUN SERPL-MCNC: 6 MG/DL (ref 8–22)
CALCIUM SERPL-MCNC: 7.9 MG/DL (ref 8.5–10.5)
CHLORIDE SERPL-SCNC: 98 MMOL/L (ref 96–112)
CO2 SERPL-SCNC: 24 MMOL/L (ref 20–33)
CREAT SERPL-MCNC: 0.68 MG/DL (ref 0.5–1.4)
EOSINOPHIL # BLD AUTO: 0.04 K/UL (ref 0–0.51)
EOSINOPHIL NFR BLD: 0.5 % (ref 0–6.9)
ERYTHROCYTE [DISTWIDTH] IN BLOOD BY AUTOMATED COUNT: 44.3 FL (ref 35.9–50)
GLUCOSE BLD-MCNC: 137 MG/DL (ref 65–99)
GLUCOSE BLD-MCNC: 163 MG/DL (ref 65–99)
GLUCOSE BLD-MCNC: 167 MG/DL (ref 65–99)
GLUCOSE SERPL-MCNC: 159 MG/DL (ref 65–99)
HCT VFR BLD AUTO: 33.2 % (ref 37–47)
HGB BLD-MCNC: 10.8 G/DL (ref 12–16)
IMM GRANULOCYTES # BLD AUTO: 0.15 K/UL (ref 0–0.11)
IMM GRANULOCYTES NFR BLD AUTO: 2 % (ref 0–0.9)
LYMPHOCYTES # BLD AUTO: 0.13 K/UL (ref 1–4.8)
LYMPHOCYTES NFR BLD: 1.8 % (ref 22–41)
MAGNESIUM SERPL-MCNC: 2.1 MG/DL (ref 1.5–2.5)
MCH RBC QN AUTO: 29 PG (ref 27–33)
MCHC RBC AUTO-ENTMCNC: 32.5 G/DL (ref 33.6–35)
MCV RBC AUTO: 89 FL (ref 81.4–97.8)
MONOCYTES # BLD AUTO: 0.33 K/UL (ref 0–0.85)
MONOCYTES NFR BLD AUTO: 4.5 % (ref 0–13.4)
NEUTROPHILS # BLD AUTO: 6.66 K/UL (ref 2–7.15)
NEUTROPHILS NFR BLD: 91.1 % (ref 44–72)
NRBC # BLD AUTO: 0 K/UL
NRBC BLD-RTO: 0 /100 WBC
PLATELET # BLD AUTO: 146 K/UL (ref 164–446)
PMV BLD AUTO: 10.6 FL (ref 9–12.9)
POTASSIUM SERPL-SCNC: 3.2 MMOL/L (ref 3.6–5.5)
RBC # BLD AUTO: 3.73 M/UL (ref 4.2–5.4)
SODIUM SERPL-SCNC: 131 MMOL/L (ref 135–145)
WBC # BLD AUTO: 7.3 K/UL (ref 4.8–10.8)

## 2021-04-18 PROCEDURE — A9270 NON-COVERED ITEM OR SERVICE: HCPCS | Performed by: FAMILY MEDICINE

## 2021-04-18 PROCEDURE — 83735 ASSAY OF MAGNESIUM: CPT

## 2021-04-18 PROCEDURE — 700105 HCHG RX REV CODE 258: Performed by: FAMILY MEDICINE

## 2021-04-18 PROCEDURE — A9270 NON-COVERED ITEM OR SERVICE: HCPCS | Performed by: STUDENT IN AN ORGANIZED HEALTH CARE EDUCATION/TRAINING PROGRAM

## 2021-04-18 PROCEDURE — 700102 HCHG RX REV CODE 250 W/ 637 OVERRIDE(OP): Performed by: STUDENT IN AN ORGANIZED HEALTH CARE EDUCATION/TRAINING PROGRAM

## 2021-04-18 PROCEDURE — 80048 BASIC METABOLIC PNL TOTAL CA: CPT

## 2021-04-18 PROCEDURE — 82962 GLUCOSE BLOOD TEST: CPT

## 2021-04-18 PROCEDURE — 36415 COLL VENOUS BLD VENIPUNCTURE: CPT

## 2021-04-18 PROCEDURE — 700111 HCHG RX REV CODE 636 W/ 250 OVERRIDE (IP): Performed by: FAMILY MEDICINE

## 2021-04-18 PROCEDURE — 770006 HCHG ROOM/CARE - MED/SURG/GYN SEMI*

## 2021-04-18 PROCEDURE — 99232 SBSQ HOSP IP/OBS MODERATE 35: CPT | Performed by: FAMILY MEDICINE

## 2021-04-18 PROCEDURE — 85025 COMPLETE CBC W/AUTO DIFF WBC: CPT

## 2021-04-18 PROCEDURE — 700102 HCHG RX REV CODE 250 W/ 637 OVERRIDE(OP): Performed by: FAMILY MEDICINE

## 2021-04-18 RX ORDER — POTASSIUM CHLORIDE 20 MEQ/1
40 TABLET, EXTENDED RELEASE ORAL 2 TIMES DAILY
Status: DISCONTINUED | OUTPATIENT
Start: 2021-04-18 | End: 2021-04-19

## 2021-04-18 RX ADMIN — METRONIDAZOLE 500 MG: 500 TABLET ORAL at 05:49

## 2021-04-18 RX ADMIN — INSULIN HUMAN 1 UNITS: 100 INJECTION, SOLUTION PARENTERAL at 23:29

## 2021-04-18 RX ADMIN — LEVOTHYROXINE SODIUM 100 MCG: 0.05 TABLET ORAL at 05:48

## 2021-04-18 RX ADMIN — POTASSIUM CHLORIDE 20 MEQ: 1500 TABLET, EXTENDED RELEASE ORAL at 05:49

## 2021-04-18 RX ADMIN — CEFEPIME 2 G: 2 INJECTION, POWDER, FOR SOLUTION INTRAVENOUS at 05:49

## 2021-04-18 RX ADMIN — ENOXAPARIN SODIUM 40 MG: 40 INJECTION SUBCUTANEOUS at 05:49

## 2021-04-18 RX ADMIN — INSULIN HUMAN 1 UNITS: 100 INJECTION, SOLUTION PARENTERAL at 13:28

## 2021-04-18 RX ADMIN — INSULIN HUMAN 1 UNITS: 100 INJECTION, SOLUTION PARENTERAL at 17:34

## 2021-04-18 RX ADMIN — SODIUM CHLORIDE: 9 INJECTION, SOLUTION INTRAVENOUS at 21:28

## 2021-04-18 RX ADMIN — OXYCODONE 5 MG: 5 TABLET ORAL at 21:27

## 2021-04-18 RX ADMIN — POTASSIUM CHLORIDE 40 MEQ: 1500 TABLET, EXTENDED RELEASE ORAL at 17:37

## 2021-04-18 RX ADMIN — OMEPRAZOLE 20 MG: 20 CAPSULE, DELAYED RELEASE ORAL at 05:49

## 2021-04-18 RX ADMIN — LORAZEPAM 0.5 MG: 1 TABLET ORAL at 05:49

## 2021-04-18 RX ADMIN — METRONIDAZOLE 500 MG: 500 TABLET ORAL at 13:25

## 2021-04-18 RX ADMIN — LORAZEPAM 0.5 MG: 1 TABLET ORAL at 17:36

## 2021-04-18 RX ADMIN — CEFEPIME 2 G: 2 INJECTION, POWDER, FOR SOLUTION INTRAVENOUS at 17:35

## 2021-04-18 ASSESSMENT — ENCOUNTER SYMPTOMS
SENSORY CHANGE: 0
ORTHOPNEA: 0
VOMITING: 0
NERVOUS/ANXIOUS: 0
DOUBLE VISION: 0
FEVER: 0
BACK PAIN: 0
BRUISES/BLEEDS EASILY: 0
DIZZINESS: 0
SORE THROAT: 0
SHORTNESS OF BREATH: 0
DIAPHORESIS: 0
BLURRED VISION: 0
STRIDOR: 0
NAUSEA: 0
TINGLING: 0
NECK PAIN: 0
CONSTIPATION: 1
DEPRESSION: 0
CHILLS: 0
WHEEZING: 0
SPEECH CHANGE: 0
MYALGIAS: 0
NERVOUS/ANXIOUS: 1
DIARRHEA: 0
HEARTBURN: 0
ABDOMINAL PAIN: 1
FOCAL WEAKNESS: 0
HEADACHES: 0
WEAKNESS: 0
COUGH: 0
PALPITATIONS: 0

## 2021-04-18 ASSESSMENT — PAIN DESCRIPTION - PAIN TYPE
TYPE: ACUTE PAIN

## 2021-04-18 NOTE — PROGRESS NOTES
"Bedside report received.  Assessment complete.  A&O x 4. Patient calls appropriately.  Patient ambulates with STBA assist. Bed alarm n/a.   Patient has 2/10 pain. Declines pain meds this am, states pain \"is tolerable\". Pt lethargic.   Denies N&V. Tolerating diabetic diet.  Surgical RLQ incision with g/t.  + void, + flatus, + BM 4/18.  Patient denies SOB.  SCD's on.  Review plan with of care with patient. Call light and personal belongings with in reach. Hourly rounding in place. All needs met at this time.  "

## 2021-04-18 NOTE — PROGRESS NOTES
Oncology/Hematology Progress Note               Author: Daphne Sue M.D. Date & Time created: 4/18/2021  1:12 PM   Diagnosis-pain abdomen, pelvic mass  Interval History:  Abdominal pain is better.    No fevers or chills.  Continues to be on antibiotics and metronidazole.  Biopsy results from the pelvic mass did not show relapsed disease.  Review of Systems:  Review of Systems   Constitutional: Positive for malaise/fatigue. Negative for fever.   HENT: Negative for hearing loss and sore throat.    Respiratory: Negative for stridor.    Cardiovascular: Negative for chest pain, palpitations and orthopnea.   Gastrointestinal: Positive for abdominal pain and constipation. Negative for heartburn and nausea.   Musculoskeletal: Negative for back pain, myalgias and neck pain.   Neurological: Negative for dizziness, tingling, sensory change, speech change and headaches.   Psychiatric/Behavioral: Negative for depression. The patient is nervous/anxious.        Physical Exam:  Physical Exam  Constitutional:       Appearance: Normal appearance.   HENT:      Mouth/Throat:      Pharynx: No oropharyngeal exudate or posterior oropharyngeal erythema.   Eyes:      General: No scleral icterus.     Pupils: Pupils are equal, round, and reactive to light.   Cardiovascular:      Rate and Rhythm: Normal rate and regular rhythm.   Pulmonary:      Effort: Pulmonary effort is normal. No respiratory distress.      Breath sounds: Normal breath sounds. No stridor. No wheezing or rhonchi.   Abdominal:      General: Abdomen is flat. There is no distension.      Tenderness: There is abdominal tenderness. There is no rebound.   Musculoskeletal:         General: No swelling or deformity. Normal range of motion.   Skin:     General: Skin is warm.      Coloration: Skin is pale. Skin is not jaundiced.      Findings: No bruising.   Neurological:      General: No focal deficit present.      Mental Status: She is alert and oriented to person, place, and  time.      Cranial Nerves: No cranial nerve deficit.      Sensory: No sensory deficit.   Psychiatric:         Mood and Affect: Mood normal.         Thought Content: Thought content normal.         Labs:          Recent Labs     21  0507   SODIUM 132* 136 131*   POTASSIUM 3.7 3.5* 3.2*   CHLORIDE 98 99 98   CO2  24   BUN 8 7* 6*   CREATININE 0.77 0.80 0.68   MAGNESIUM 2.1 1.9 2.1   CALCIUM 8.7 8.4* 7.9*     Recent Labs     21  0507   GLUCOSE 117* 139* 159*     Recent Labs     21  0507   RBC 4.21 3.72* 3.73*   HEMOGLOBIN 12.6 10.9* 10.8*   HEMATOCRIT 40.4 33.4* 33.2*   PLATELETCT 100* 138* 146*     Recent Labs     21  0507   WBC 6.3 8.7 7.3   NEUTSPOLYS 89.70* 91.20* 91.10*   LYMPHOCYTES 2.90* 1.90* 1.80*   MONOCYTES 4.90 4.50 4.50   EOSINOPHILS 0.60 0.80 0.50   BASOPHILS 0.30 0.20 0.10     Recent Labs     21  0507   SODIUM 132* 136 131*   POTASSIUM 3.7 3.5* 3.2*   CHLORIDE 98 99 98   CO2  24   GLUCOSE 117* 139* 159*   BUN 8 7* 6*   CREATININE 0.77 0.80 0.68   CALCIUM 8.7 8.4* 7.9*     Hemodynamics:  Temp (24hrs), Av.9 °C (98.4 °F), Min:36.2 °C (97.1 °F), Max:37.2 °C (99 °F)  Temperature: 37.2 °C (99 °F)  Pulse  Av.2  Min: 68  Max: 113   Blood Pressure: 125/65     Respiratory:    Respiration: 17, Pulse Oximetry: 94 %        RUL Breath Sounds: Clear, RML Breath Sounds: Clear, RLL Breath Sounds: Diminished, ALFONZO Breath Sounds: Clear, LLL Breath Sounds: Diminished  Fluids:    Intake/Output Summary (Last 24 hours) at 2021 1345  Last data filed at 2021 0900  Gross per 24 hour   Intake 100 ml   Output --   Net 100 ml        GI/Nutrition:  Orders Placed This Encounter   Procedures   • Diet Order Diet: Regular     Standing Status:   Standing     Number of Occurrences:   1     Order Specific Question:   Diet:     Answer:    Regular [1]     Medical Decision Making, by Problem:  Active Hospital Problems    Diagnosis    • *Other intra-abdominal and pelvic swelling, mass and lump [R19.09]    • Sepsis (HCC) [A41.9]    • Non-Hodgkin lymphoma (HCC) [C85.90]    • Hypokalemia [E87.6]    • Normocytic anemia [D64.9]    • Thrombocytopenia (HCC) [D69.6]    • History of DVT (deep vein thrombosis) [Z86.718]    • Type 2 diabetes mellitus with hyperglycemia, with long-term current use of insulin (HCC) [E11.65, Z79.4]    • Hypothyroidism [E03.9]        Plan:  Follicular non-Hodgkin's lymphoma-the patient was recently treated for relapsed follicular non-Hodgkin's lymphoma.  Biopsy from this mass is negative.   Plan at this time is to continue her antibiotics.  There is a possibility that she may need another biopsy.  However likelihood of this mass being infected Is higher since fevers have resolved on antibiotics.  Pain has also improved to some extent  Pain abdomen-better controlled.  Continue present management.  Continue cefepime and metronidazole.    DVT-On Xarelto  Fever-Cultures negative so far  Patient will follow up with me in the office.  will sign off.  Reconsult as needed    Quality-Core Measures

## 2021-04-18 NOTE — CARE PLAN
Problem: Knowledge Deficit  Goal: Knowledge of disease process/condition, treatment plan, diagnostic tests, and medications will improve  4/18/2021 0811 by Marianela Ross R.N.  Outcome: PROGRESSING AS EXPECTED  Pt scheduled for MRI this am. Per pt extremely claustrophobic and needs sedation for MRI. MD to be updated order is without and needs to be reordered. Pt updated and verbalizes understanding.      Problem: Communication  Goal: The ability to communicate needs accurately and effectively will improve  4/18/2021 0811 by Marianela Ross R.N.  Outcome: PROGRESSING AS EXPECTED

## 2021-04-18 NOTE — PROGRESS NOTES
Bedside report received. Assessment completed.  Pt is A&O x4. Pt on room air.   Medicating for pain PRN per MAR Pain    Denies nausea.  Skin: RLQ puncture for biobsy.  Last BM 4/17/21, +void.  Diabetic diet + boost. Tolerates well.   Pt up with stand-by assist.  Call light and belongings within reach. All needs met at this time. Fall Precautions and hourly rounding in place.

## 2021-04-18 NOTE — CARE PLAN
Problem: Safety  Goal: Will remain free from falls  Outcome: MET   Pt uses the call light appropriately.  Problem: Infection  Goal: Will remain free from infection  Outcome: MET   Medications given per MAR.

## 2021-04-18 NOTE — PROGRESS NOTES
[facility] Medicine Daily Progress Note    Date of Service  4/18/2021    Chief Complaint  56 y.o. female admitted 4/11/2021 with pelvic mass.    Hospital Course  Admitted with pelvic mass, known history of non-Hodgkin's lymphoma.  She was started on empiric coverage with IV Rocephin and Flagyl.  Oncology was consulted on the case and recommended biopsy of the possible mass.  CT-guided retroperitoneal biopsy was done on 4/14/2021.  Pathology results showed several small fibrous course with areas of necrosis and area of acute inflammation, no lymph node tissue identified, no unequivocal malignancy identified.  She also had episodes of fever during this time.  Her antibiotics were changed to IV cefepime and Flagyl and her fever has resolved.    Interval Problem Update  Pelvis mass - T-max 99, less pain, for MRI  Diabetes - -150  Low potassium     Consultants/Specialty  Surgery  Oncology    Code Status  Full Code    Disposition  TBD     Review of Systems  Review of Systems   Constitutional: Negative for chills, diaphoresis, fever and malaise/fatigue.   HENT: Negative for congestion, hearing loss and sore throat.    Eyes: Negative for blurred vision and double vision.   Respiratory: Negative for cough, shortness of breath and wheezing.    Cardiovascular: Negative for chest pain, palpitations and leg swelling.   Gastrointestinal: Positive for abdominal pain. Negative for diarrhea, heartburn, nausea and vomiting.   Genitourinary: Negative for dysuria and frequency.   Musculoskeletal: Negative for back pain, joint pain, myalgias and neck pain.   Skin: Negative for itching and rash.   Neurological: Negative for dizziness, sensory change, speech change, focal weakness, weakness and headaches.   Endo/Heme/Allergies: Negative for environmental allergies. Does not bruise/bleed easily.   Psychiatric/Behavioral: The patient is not nervous/anxious.         Physical Exam  Temp:  [36.2 °C (97.1 °F)-37.2 °C (99 °F)] 37.2 °C (99  °F)  Pulse:  [90-95] 90  Resp:  [17-18] 17  BP: (125-141)/(59-68) 125/65  SpO2:  [89 %-96 %] 94 %    Physical Exam  Vitals and nursing note reviewed.   HENT:      Head: Normocephalic and atraumatic.      Nose: No congestion.      Mouth/Throat:      Mouth: Mucous membranes are moist.   Eyes:      General: No scleral icterus.     Extraocular Movements: Extraocular movements intact.      Conjunctiva/sclera: Conjunctivae normal.      Pupils: Pupils are equal, round, and reactive to light.   Cardiovascular:      Rate and Rhythm: Normal rate and regular rhythm.   Pulmonary:      Effort: Pulmonary effort is normal.      Breath sounds: Normal breath sounds.   Abdominal:      General: Bowel sounds are normal. There is no distension.      Palpations: Abdomen is soft.      Tenderness: There is abdominal tenderness (Suprapubic). There is no guarding or rebound.   Musculoskeletal:      Cervical back: No tenderness.      Right lower leg: No edema.      Left lower leg: No edema.   Skin:     General: Skin is warm and dry.   Neurological:      General: No focal deficit present.      Mental Status: She is alert and oriented to person, place, and time.      Cranial Nerves: No cranial nerve deficit.         Fluids    Intake/Output Summary (Last 24 hours) at 4/18/2021 1020  Last data filed at 4/18/2021 0915  Gross per 24 hour   Intake 1350 ml   Output --   Net 1350 ml       Laboratory  Recent Labs     04/16/21  0611 04/17/21  0340 04/18/21  0507   WBC 6.3 8.7 7.3   RBC 4.21 3.72* 3.73*   HEMOGLOBIN 12.6 10.9* 10.8*   HEMATOCRIT 40.4 33.4* 33.2*   MCV 96.0 89.8 89.0   MCH 29.9 29.3 29.0   MCHC 31.2* 32.6* 32.5*   RDW 47.9 44.0 44.3   PLATELETCT 100* 138* 146*   MPV 11.5 10.3 10.6     Recent Labs     04/16/21  0611 04/17/21  0340 04/18/21  0507   SODIUM 132* 136 131*   POTASSIUM 3.7 3.5* 3.2*   CHLORIDE 98 99 98   CO2 21 23 24   GLUCOSE 117* 139* 159*   BUN 8 7* 6*   CREATININE 0.77 0.80 0.68   CALCIUM 8.7 8.4* 7.9*                    Imaging  IR-US GUIDED PIV   Final Result    Ultrasound-guided PERIPHERAL IV INSERTION performed by    qualified nursing staff as above.      CT-NEEDLE BX-ABD-RETROPERITONL   Final Result      1.  CT GUIDED Right RETROPERITONEAL BIOPSY.      OUTSIDE IMAGES-CT ABDOMEN /PELVIS   Final Result      OUTSIDE IMAGES-DX CHEST   Final Result      MR-ABDOMEN-WITH & W/O    (Results Pending)   MR-PELVIS WITH    (Results Pending)        Assessment/Plan  * Other intra-abdominal and pelvic swelling, mass and lump- (present on admission)  Assessment & Plan  IV Cefepime, Flagyl  CT-guided right retroperitoneal biopsy 4/14/2021 - Several small fibrous course with areas of necrosis and area of acute inflammation. No lymph node tissue identified. No unequivocal malignancy identified.     For MRI abdomen and pelvis with contrast - under Anesthesia due to Claustrophobia    Non-Hodgkin lymphoma (HCC)- (present on admission)  Assessment & Plan  Oncology following    Sepsis (HCC)- (present on admission)  Assessment & Plan  This is Sepsis Present on admission  SIRS criteria identified on my evaluation include: Fever, with temperature greater than 101 deg F and Tachycardia, with heart rate greater than 90 BPM  Source -abdominal/pelvic abscess?  Sepsis protocol initiated  Fluid resuscitation ordered per protocol  IV antibiotics as appropriate for source of sepsis  While organ dysfunction may be noted elsewhere in this problem list or in the chart, degree of organ dysfunction does not meet CMS criteria for severe sepsis  Monitor       Hypomagnesemia- (present on admission)  Assessment & Plan  IV Mg given  Follow level    Hypokalemia- (present on admission)  Assessment & Plan  Increase Kdur to 40 meqs BID  follow BMP    Normocytic anemia- (present on admission)  Assessment & Plan  Follow cbc    Thrombocytopenia (HCC)- (present on admission)  Assessment & Plan  Follow CBC     Type 2 diabetes mellitus with hyperglycemia, with long-term current  use of insulin (HCC)- (present on admission)  Assessment & Plan  SSI    History of DVT (deep vein thrombosis)- (present on admission)  Assessment & Plan  Hold Xarelto    Hypothyroidism- (present on admission)  Assessment & Plan  Synthroid       VTE prophylaxis: Lovenox

## 2021-04-18 NOTE — CARE PLAN
Problem: Communication  Goal: The ability to communicate needs accurately and effectively will improve  Outcome: PROGRESSING AS EXPECTED     Problem: Knowledge Deficit  Goal: Knowledge of disease process/condition, treatment plan, diagnostic tests, and medications will improve  Outcome: PROGRESSING AS EXPECTED  Pt scheduled MRI this am. Sedation needed, MRI re ordered with sedation. MD updated, Pt educated and verbalizes understanding.

## 2021-04-19 PROBLEM — E83.39 HYPOPHOSPHATEMIA: Status: ACTIVE | Noted: 2021-04-19

## 2021-04-19 LAB
ANION GAP SERPL CALC-SCNC: 9 MMOL/L (ref 7–16)
BACTERIA BLD CULT: NORMAL
BACTERIA BLD CULT: NORMAL
BASOPHILS # BLD AUTO: 0.4 % (ref 0–1.8)
BASOPHILS # BLD: 0.03 K/UL (ref 0–0.12)
BUN SERPL-MCNC: 6 MG/DL (ref 8–22)
CALCIUM SERPL-MCNC: 8.2 MG/DL (ref 8.5–10.5)
CHLORIDE SERPL-SCNC: 101 MMOL/L (ref 96–112)
CO2 SERPL-SCNC: 28 MMOL/L (ref 20–33)
CREAT SERPL-MCNC: 0.68 MG/DL (ref 0.5–1.4)
EOSINOPHIL # BLD AUTO: 0.06 K/UL (ref 0–0.51)
EOSINOPHIL NFR BLD: 0.8 % (ref 0–6.9)
ERYTHROCYTE [DISTWIDTH] IN BLOOD BY AUTOMATED COUNT: 43.8 FL (ref 35.9–50)
GLUCOSE BLD-MCNC: 153 MG/DL (ref 65–99)
GLUCOSE BLD-MCNC: 165 MG/DL (ref 65–99)
GLUCOSE SERPL-MCNC: 163 MG/DL (ref 65–99)
HCT VFR BLD AUTO: 32 % (ref 37–47)
HGB BLD-MCNC: 10.5 G/DL (ref 12–16)
IMM GRANULOCYTES # BLD AUTO: 0.25 K/UL (ref 0–0.11)
IMM GRANULOCYTES NFR BLD AUTO: 3.3 % (ref 0–0.9)
LYMPHOCYTES # BLD AUTO: 0.16 K/UL (ref 1–4.8)
LYMPHOCYTES NFR BLD: 2.1 % (ref 22–41)
MAGNESIUM SERPL-MCNC: 2.1 MG/DL (ref 1.5–2.5)
MCH RBC QN AUTO: 28.8 PG (ref 27–33)
MCHC RBC AUTO-ENTMCNC: 32.8 G/DL (ref 33.6–35)
MCV RBC AUTO: 87.9 FL (ref 81.4–97.8)
MONOCYTES # BLD AUTO: 0.36 K/UL (ref 0–0.85)
MONOCYTES NFR BLD AUTO: 4.7 % (ref 0–13.4)
NEUTROPHILS # BLD AUTO: 6.77 K/UL (ref 2–7.15)
NEUTROPHILS NFR BLD: 88.7 % (ref 44–72)
NRBC # BLD AUTO: 0 K/UL
NRBC BLD-RTO: 0 /100 WBC
PHOSPHATE SERPL-MCNC: 1.7 MG/DL (ref 2.5–4.5)
PLATELET # BLD AUTO: 173 K/UL (ref 164–446)
PMV BLD AUTO: 9.9 FL (ref 9–12.9)
POTASSIUM SERPL-SCNC: 3.2 MMOL/L (ref 3.6–5.5)
RBC # BLD AUTO: 3.64 M/UL (ref 4.2–5.4)
SARS-COV-2 RNA RESP QL NAA+PROBE: NOTDETECTED
SIGNIFICANT IND 70042: NORMAL
SIGNIFICANT IND 70042: NORMAL
SITE SITE: NORMAL
SITE SITE: NORMAL
SODIUM SERPL-SCNC: 138 MMOL/L (ref 135–145)
SOURCE SOURCE: NORMAL
SOURCE SOURCE: NORMAL
SPECIMEN SOURCE: NORMAL
WBC # BLD AUTO: 7.6 K/UL (ref 4.8–10.8)

## 2021-04-19 PROCEDURE — U0003 INFECTIOUS AGENT DETECTION BY NUCLEIC ACID (DNA OR RNA); SEVERE ACUTE RESPIRATORY SYNDROME CORONAVIRUS 2 (SARS-COV-2) (CORONAVIRUS DISEASE [COVID-19]), AMPLIFIED PROBE TECHNIQUE, MAKING USE OF HIGH THROUGHPUT TECHNOLOGIES AS DESCRIBED BY CMS-2020-01-R: HCPCS

## 2021-04-19 PROCEDURE — 700105 HCHG RX REV CODE 258: Performed by: FAMILY MEDICINE

## 2021-04-19 PROCEDURE — U0005 INFEC AGEN DETEC AMPLI PROBE: HCPCS

## 2021-04-19 PROCEDURE — 84100 ASSAY OF PHOSPHORUS: CPT

## 2021-04-19 PROCEDURE — 85025 COMPLETE CBC W/AUTO DIFF WBC: CPT

## 2021-04-19 PROCEDURE — 700102 HCHG RX REV CODE 250 W/ 637 OVERRIDE(OP): Performed by: STUDENT IN AN ORGANIZED HEALTH CARE EDUCATION/TRAINING PROGRAM

## 2021-04-19 PROCEDURE — 99232 SBSQ HOSP IP/OBS MODERATE 35: CPT | Performed by: FAMILY MEDICINE

## 2021-04-19 PROCEDURE — 770006 HCHG ROOM/CARE - MED/SURG/GYN SEMI*

## 2021-04-19 PROCEDURE — 80048 BASIC METABOLIC PNL TOTAL CA: CPT

## 2021-04-19 PROCEDURE — 700102 HCHG RX REV CODE 250 W/ 637 OVERRIDE(OP): Performed by: FAMILY MEDICINE

## 2021-04-19 PROCEDURE — A9270 NON-COVERED ITEM OR SERVICE: HCPCS | Performed by: STUDENT IN AN ORGANIZED HEALTH CARE EDUCATION/TRAINING PROGRAM

## 2021-04-19 PROCEDURE — A9270 NON-COVERED ITEM OR SERVICE: HCPCS | Performed by: FAMILY MEDICINE

## 2021-04-19 PROCEDURE — 82962 GLUCOSE BLOOD TEST: CPT

## 2021-04-19 PROCEDURE — 36415 COLL VENOUS BLD VENIPUNCTURE: CPT

## 2021-04-19 PROCEDURE — 83735 ASSAY OF MAGNESIUM: CPT

## 2021-04-19 PROCEDURE — 700111 HCHG RX REV CODE 636 W/ 250 OVERRIDE (IP): Performed by: FAMILY MEDICINE

## 2021-04-19 RX ORDER — POTASSIUM CHLORIDE 20 MEQ/1
20 TABLET, EXTENDED RELEASE ORAL DAILY
Status: DISCONTINUED | OUTPATIENT
Start: 2021-04-20 | End: 2021-04-20

## 2021-04-19 RX ORDER — DEXTROSE MONOHYDRATE 25 G/50ML
50 INJECTION, SOLUTION INTRAVENOUS
Status: DISCONTINUED | OUTPATIENT
Start: 2021-04-19 | End: 2021-04-19

## 2021-04-19 RX ORDER — DEXTROSE MONOHYDRATE 25 G/50ML
50 INJECTION, SOLUTION INTRAVENOUS
Status: DISCONTINUED | OUTPATIENT
Start: 2021-04-19 | End: 2021-04-21 | Stop reason: HOSPADM

## 2021-04-19 RX ADMIN — DIBASIC SODIUM PHOSPHATE, MONOBASIC POTASSIUM PHOSPHATE AND MONOBASIC SODIUM PHOSPHATE 250 MG: 852; 155; 130 TABLET ORAL at 09:44

## 2021-04-19 RX ADMIN — LORAZEPAM 0.5 MG: 1 TABLET ORAL at 06:30

## 2021-04-19 RX ADMIN — INSULIN HUMAN 1 UNITS: 100 INJECTION, SOLUTION PARENTERAL at 17:41

## 2021-04-19 RX ADMIN — SODIUM CHLORIDE: 9 INJECTION, SOLUTION INTRAVENOUS at 14:02

## 2021-04-19 RX ADMIN — POTASSIUM CHLORIDE 40 MEQ: 1500 TABLET, EXTENDED RELEASE ORAL at 06:30

## 2021-04-19 RX ADMIN — DIBASIC SODIUM PHOSPHATE, MONOBASIC POTASSIUM PHOSPHATE AND MONOBASIC SODIUM PHOSPHATE 250 MG: 852; 155; 130 TABLET ORAL at 17:55

## 2021-04-19 RX ADMIN — OMEPRAZOLE 20 MG: 20 CAPSULE, DELAYED RELEASE ORAL at 06:30

## 2021-04-19 RX ADMIN — LORAZEPAM 0.5 MG: 1 TABLET ORAL at 17:50

## 2021-04-19 RX ADMIN — LEVOTHYROXINE SODIUM 100 MCG: 0.05 TABLET ORAL at 06:31

## 2021-04-19 RX ADMIN — CEFEPIME 2 G: 2 INJECTION, POWDER, FOR SOLUTION INTRAVENOUS at 06:30

## 2021-04-19 RX ADMIN — ENOXAPARIN SODIUM 40 MG: 40 INJECTION SUBCUTANEOUS at 06:30

## 2021-04-19 RX ADMIN — INSULIN HUMAN 1 UNITS: 100 INJECTION, SOLUTION PARENTERAL at 12:04

## 2021-04-19 RX ADMIN — CEFEPIME 2 G: 2 INJECTION, POWDER, FOR SOLUTION INTRAVENOUS at 17:49

## 2021-04-19 RX ADMIN — DOCUSATE SODIUM 50 MG AND SENNOSIDES 8.6 MG 2 TABLET: 8.6; 5 TABLET, FILM COATED ORAL at 06:31

## 2021-04-19 ASSESSMENT — ENCOUNTER SYMPTOMS
DIAPHORESIS: 0
DIARRHEA: 0
HEADACHES: 0
DOUBLE VISION: 0
DIZZINESS: 0
WEAKNESS: 0
SPEECH CHANGE: 0
NERVOUS/ANXIOUS: 0
ABDOMINAL PAIN: 1
HEARTBURN: 0
NECK PAIN: 0
WHEEZING: 0
FOCAL WEAKNESS: 0
PALPITATIONS: 0
MYALGIAS: 0
COUGH: 0
NAUSEA: 0
SHORTNESS OF BREATH: 0
BACK PAIN: 0
SORE THROAT: 0
SENSORY CHANGE: 0
VOMITING: 0
BRUISES/BLEEDS EASILY: 0
BLURRED VISION: 0
CHILLS: 0
FEVER: 0

## 2021-04-19 ASSESSMENT — PAIN DESCRIPTION - PAIN TYPE
TYPE: ACUTE PAIN

## 2021-04-19 NOTE — CARE PLAN
Problem: Safety  Goal: Will remain free from injury  Outcome: PROGRESSING AS EXPECTED   Bed in lowest position, call light within reach, non slip  socks on, IV pole on same side of bed as bathroom, room free of clutter.    Problem: Pain Management  Goal: Pain level will decrease to patient's comfort goal  Outcome: PROGRESSING AS EXPECTED   Administer pain medication as appropriate, keep pain at or below patient comfort goal of 4 or less, reposition for comfort.

## 2021-04-19 NOTE — PROGRESS NOTES
[facility] Medicine Daily Progress Note    Date of Service  4/19/2021    Chief Complaint  56 y.o. female admitted 4/11/2021 with pelvic mass.    Hospital Course  Admitted with pelvic mass, known history of non-Hodgkin's lymphoma.  She was started on empiric coverage with IV Rocephin and Flagyl.  Oncology was consulted on the case and recommended biopsy of the possible mass.  CT-guided retroperitoneal biopsy was done on 4/14/2021.  Pathology results showed several small fibrous course with areas of necrosis and area of acute inflammation, no lymph node tissue identified, no unequivocal malignancy identified.  She also had episodes of fever during this time.  Her antibiotics were changed to IV cefepime and Flagyl and her fever has resolved.    Interval Problem Update  Pelvis mass - T-max 99.1, less pain, for MRI  Diabetes - -160  Low potassium, phosphorus    Consultants/Specialty  Surgery  Oncology    Code Status  Full Code    Disposition  TBD     Review of Systems  Review of Systems   Constitutional: Negative for chills, diaphoresis, fever and malaise/fatigue.   HENT: Negative for congestion, hearing loss and sore throat.    Eyes: Negative for blurred vision and double vision.   Respiratory: Negative for cough, shortness of breath and wheezing.    Cardiovascular: Negative for chest pain, palpitations and leg swelling.   Gastrointestinal: Positive for abdominal pain. Negative for diarrhea, heartburn, nausea and vomiting.   Genitourinary: Negative for dysuria and frequency.   Musculoskeletal: Negative for back pain, joint pain, myalgias and neck pain.   Skin: Negative for itching and rash.   Neurological: Negative for dizziness, sensory change, speech change, focal weakness, weakness and headaches.   Endo/Heme/Allergies: Negative for environmental allergies. Does not bruise/bleed easily.   Psychiatric/Behavioral: The patient is not nervous/anxious.         Physical Exam  Temp:  [36.2 °C (97.1 °F)-37.3 °C (99.1 °F)]  37.3 °C (99.1 °F)  Pulse:  [89-90] 90  Resp:  [18] 18  BP: (118-141)/(58-66) 118/63  SpO2:  [86 %-97 %] 97 %    Physical Exam  Vitals and nursing note reviewed.   HENT:      Head: Normocephalic and atraumatic.      Nose: No congestion.      Mouth/Throat:      Mouth: Mucous membranes are moist.   Eyes:      General: No scleral icterus.     Extraocular Movements: Extraocular movements intact.      Conjunctiva/sclera: Conjunctivae normal.      Pupils: Pupils are equal, round, and reactive to light.   Cardiovascular:      Rate and Rhythm: Normal rate and regular rhythm.   Pulmonary:      Effort: Pulmonary effort is normal.      Breath sounds: Normal breath sounds.   Abdominal:      General: Bowel sounds are normal. There is no distension.      Palpations: Abdomen is soft.      Tenderness: There is abdominal tenderness (Suprapubic). There is no guarding or rebound.   Musculoskeletal:      Cervical back: No tenderness.      Right lower leg: No edema.      Left lower leg: No edema.   Skin:     General: Skin is warm and dry.   Neurological:      General: No focal deficit present.      Mental Status: She is alert and oriented to person, place, and time.      Cranial Nerves: No cranial nerve deficit.         Fluids    Intake/Output Summary (Last 24 hours) at 4/19/2021 1016  Last data filed at 4/19/2021 0900  Gross per 24 hour   Intake 1193.33 ml   Output --   Net 1193.33 ml       Laboratory  Recent Labs     04/17/21  0340 04/18/21  0507 04/19/21  0207   WBC 8.7 7.3 7.6   RBC 3.72* 3.73* 3.64*   HEMOGLOBIN 10.9* 10.8* 10.5*   HEMATOCRIT 33.4* 33.2* 32.0*   MCV 89.8 89.0 87.9   MCH 29.3 29.0 28.8   MCHC 32.6* 32.5* 32.8*   RDW 44.0 44.3 43.8   PLATELETCT 138* 146* 173   MPV 10.3 10.6 9.9     Recent Labs     04/17/21  0340 04/18/21  0507 04/19/21  0207   SODIUM 136 131* 138   POTASSIUM 3.5* 3.2* 3.2*   CHLORIDE 99 98 101   CO2 23 24 28   GLUCOSE 139* 159* 163*   BUN 7* 6* 6*   CREATININE 0.80 0.68 0.68   CALCIUM 8.4* 7.9* 8.2*                    Imaging  IR-US GUIDED PIV   Final Result    Ultrasound-guided PERIPHERAL IV INSERTION performed by    qualified nursing staff as above.      CT-NEEDLE BX-ABD-RETROPERITONL   Final Result      1.  CT GUIDED Right RETROPERITONEAL BIOPSY.      OUTSIDE IMAGES-CT ABDOMEN /PELVIS   Final Result      OUTSIDE IMAGES-DX CHEST   Final Result      MR-ABDOMEN-WITH & W/O    (Results Pending)   MR-PELVIS WITH    (Results Pending)        Assessment/Plan  * Other intra-abdominal and pelvic swelling, mass and lump- (present on admission)  Assessment & Plan  IV Cefepime, Flagyl  CT-guided right retroperitoneal biopsy 4/14/2021 - Several small fibrous course with areas of necrosis and area of acute inflammation. No lymph node tissue identified. No unequivocal malignancy identified.     For MRI abdomen and pelvis with contrast - under Anesthesia due to Claustrophobia    Non-Hodgkin lymphoma (HCC)- (present on admission)  Assessment & Plan  Oncology follow up as outpatient    Sepsis (HCC)- (present on admission)  Assessment & Plan  This is Sepsis Present on admission  SIRS criteria identified on my evaluation include: Fever, with temperature greater than 101 deg F and Tachycardia, with heart rate greater than 90 BPM  Source -abdominal/pelvic abscess?  Sepsis protocol initiated  Fluid resuscitation ordered per protocol  IV antibiotics as appropriate for source of sepsis  While organ dysfunction may be noted elsewhere in this problem list or in the chart, degree of organ dysfunction does not meet CMS criteria for severe sepsis  Monitor       Hypophosphatemia- (present on admission)  Assessment & Plan  Start Kphos    Hypomagnesemia- (present on admission)  Assessment & Plan  IV Mg given  Follow level    Hypokalemia- (present on admission)  Assessment & Plan  Kdur   follow BMP    Normocytic anemia- (present on admission)  Assessment & Plan  Follow cbc    Thrombocytopenia (HCC)- (present on admission)  Assessment &  Plan  Follow CBC     Type 2 diabetes mellitus with hyperglycemia, with long-term current use of insulin (HCC)- (present on admission)  Assessment & Plan  SSI    History of DVT (deep vein thrombosis)- (present on admission)  Assessment & Plan  Hold Xarelto    Hypothyroidism- (present on admission)  Assessment & Plan  Synthroid       VTE prophylaxis: Lovenox

## 2021-04-19 NOTE — PROGRESS NOTES
Assumed care of patient at 0700. Patient is alert and oriented, respirations are unlabored and regular. Patient up to bathroom, states she is pooping a small, loose amount every time she voids. Lung sounds clear throughout, diminished in bases on room air. Abdomen soft, tender to RLQ, gauze in place from biopsy, CDI, +bowel sounds, BM 4/19. Voiding without difficulty. Pain stated at 2, declines intervention. Bed in lowest position, call light within reach, patient states no needs at this time.   No

## 2021-04-20 ENCOUNTER — ANESTHESIA (OUTPATIENT)
Dept: SURGERY | Facility: MEDICAL CENTER | Age: 56
DRG: 872 | End: 2021-04-20
Payer: COMMERCIAL

## 2021-04-20 ENCOUNTER — APPOINTMENT (OUTPATIENT)
Dept: RADIOLOGY | Facility: MEDICAL CENTER | Age: 56
DRG: 872 | End: 2021-04-20
Attending: FAMILY MEDICINE
Payer: COMMERCIAL

## 2021-04-20 ENCOUNTER — ANESTHESIA EVENT (OUTPATIENT)
Dept: SURGERY | Facility: MEDICAL CENTER | Age: 56
DRG: 872 | End: 2021-04-20
Payer: COMMERCIAL

## 2021-04-20 LAB
ANION GAP SERPL CALC-SCNC: 10 MMOL/L (ref 7–16)
BUN SERPL-MCNC: 6 MG/DL (ref 8–22)
CALCIUM SERPL-MCNC: 8.2 MG/DL (ref 8.5–10.5)
CHLORIDE SERPL-SCNC: 96 MMOL/L (ref 96–112)
CO2 SERPL-SCNC: 28 MMOL/L (ref 20–33)
CREAT SERPL-MCNC: 0.57 MG/DL (ref 0.5–1.4)
GLUCOSE BLD-MCNC: 149 MG/DL (ref 65–99)
GLUCOSE BLD-MCNC: 150 MG/DL (ref 65–99)
GLUCOSE BLD-MCNC: 154 MG/DL (ref 65–99)
GLUCOSE BLD-MCNC: 191 MG/DL (ref 65–99)
GLUCOSE BLD-MCNC: 203 MG/DL (ref 65–99)
GLUCOSE SERPL-MCNC: 160 MG/DL (ref 65–99)
MAGNESIUM SERPL-MCNC: 2 MG/DL (ref 1.5–2.5)
PHOSPHATE SERPL-MCNC: 2.4 MG/DL (ref 2.5–4.5)
POTASSIUM BLD-SCNC: 2.9 MMOL/L (ref 3.6–5.5)
POTASSIUM SERPL-SCNC: 2.8 MMOL/L (ref 3.6–5.5)
SODIUM SERPL-SCNC: 134 MMOL/L (ref 135–145)

## 2021-04-20 PROCEDURE — 84132 ASSAY OF SERUM POTASSIUM: CPT

## 2021-04-20 PROCEDURE — 84100 ASSAY OF PHOSPHORUS: CPT

## 2021-04-20 PROCEDURE — A9270 NON-COVERED ITEM OR SERVICE: HCPCS | Performed by: FAMILY MEDICINE

## 2021-04-20 PROCEDURE — 72197 MRI PELVIS W/O & W/DYE: CPT

## 2021-04-20 PROCEDURE — A9576 INJ PROHANCE MULTIPACK: HCPCS | Performed by: FAMILY MEDICINE

## 2021-04-20 PROCEDURE — 83735 ASSAY OF MAGNESIUM: CPT

## 2021-04-20 PROCEDURE — 700101 HCHG RX REV CODE 250: Performed by: ANESTHESIOLOGY

## 2021-04-20 PROCEDURE — 700105 HCHG RX REV CODE 258: Performed by: FAMILY MEDICINE

## 2021-04-20 PROCEDURE — BW3GYZZ MAGNETIC RESONANCE IMAGING (MRI) OF PELVIC REGION USING OTHER CONTRAST: ICD-10-PCS | Performed by: RADIOLOGY

## 2021-04-20 PROCEDURE — 80048 BASIC METABOLIC PNL TOTAL CA: CPT

## 2021-04-20 PROCEDURE — 82962 GLUCOSE BLOOD TEST: CPT | Mod: 91

## 2021-04-20 PROCEDURE — 700111 HCHG RX REV CODE 636 W/ 250 OVERRIDE (IP): Performed by: ANESTHESIOLOGY

## 2021-04-20 PROCEDURE — 770006 HCHG ROOM/CARE - MED/SURG/GYN SEMI*

## 2021-04-20 PROCEDURE — 700102 HCHG RX REV CODE 250 W/ 637 OVERRIDE(OP): Performed by: FAMILY MEDICINE

## 2021-04-20 PROCEDURE — 700102 HCHG RX REV CODE 250 W/ 637 OVERRIDE(OP): Performed by: INTERNAL MEDICINE

## 2021-04-20 PROCEDURE — 700117 HCHG RX CONTRAST REV CODE 255: Performed by: FAMILY MEDICINE

## 2021-04-20 PROCEDURE — 700111 HCHG RX REV CODE 636 W/ 250 OVERRIDE (IP): Performed by: FAMILY MEDICINE

## 2021-04-20 PROCEDURE — 700102 HCHG RX REV CODE 250 W/ 637 OVERRIDE(OP): Performed by: STUDENT IN AN ORGANIZED HEALTH CARE EDUCATION/TRAINING PROGRAM

## 2021-04-20 PROCEDURE — 160002 HCHG RECOVERY MINUTES (STAT)

## 2021-04-20 PROCEDURE — A9270 NON-COVERED ITEM OR SERVICE: HCPCS | Performed by: INTERNAL MEDICINE

## 2021-04-20 PROCEDURE — 74183 MRI ABD W/O CNTR FLWD CNTR: CPT

## 2021-04-20 PROCEDURE — 99232 SBSQ HOSP IP/OBS MODERATE 35: CPT | Performed by: INTERNAL MEDICINE

## 2021-04-20 PROCEDURE — A9270 NON-COVERED ITEM OR SERVICE: HCPCS | Performed by: STUDENT IN AN ORGANIZED HEALTH CARE EDUCATION/TRAINING PROGRAM

## 2021-04-20 PROCEDURE — 36415 COLL VENOUS BLD VENIPUNCTURE: CPT

## 2021-04-20 RX ORDER — DIPHENHYDRAMINE HYDROCHLORIDE 50 MG/ML
12.5 INJECTION INTRAMUSCULAR; INTRAVENOUS
Status: DISCONTINUED | OUTPATIENT
Start: 2021-04-20 | End: 2021-04-20 | Stop reason: HOSPADM

## 2021-04-20 RX ORDER — ONDANSETRON 2 MG/ML
4 INJECTION INTRAMUSCULAR; INTRAVENOUS
Status: DISCONTINUED | OUTPATIENT
Start: 2021-04-20 | End: 2021-04-20 | Stop reason: HOSPADM

## 2021-04-20 RX ORDER — HYDROMORPHONE HYDROCHLORIDE 1 MG/ML
0.1 INJECTION, SOLUTION INTRAMUSCULAR; INTRAVENOUS; SUBCUTANEOUS
Status: DISCONTINUED | OUTPATIENT
Start: 2021-04-20 | End: 2021-04-20 | Stop reason: HOSPADM

## 2021-04-20 RX ORDER — OXYCODONE HCL 5 MG/5 ML
5 SOLUTION, ORAL ORAL
Status: DISCONTINUED | OUTPATIENT
Start: 2021-04-20 | End: 2021-04-20 | Stop reason: HOSPADM

## 2021-04-20 RX ORDER — HYDROMORPHONE HYDROCHLORIDE 1 MG/ML
0.4 INJECTION, SOLUTION INTRAMUSCULAR; INTRAVENOUS; SUBCUTANEOUS
Status: DISCONTINUED | OUTPATIENT
Start: 2021-04-20 | End: 2021-04-20 | Stop reason: HOSPADM

## 2021-04-20 RX ORDER — POTASSIUM CHLORIDE 20 MEQ/1
40 TABLET, EXTENDED RELEASE ORAL 3 TIMES DAILY
Status: DISCONTINUED | OUTPATIENT
Start: 2021-04-20 | End: 2021-04-21 | Stop reason: HOSPADM

## 2021-04-20 RX ORDER — ONDANSETRON 2 MG/ML
INJECTION INTRAMUSCULAR; INTRAVENOUS PRN
Status: DISCONTINUED | OUTPATIENT
Start: 2021-04-20 | End: 2021-04-20 | Stop reason: SURG

## 2021-04-20 RX ORDER — HYDROMORPHONE HYDROCHLORIDE 1 MG/ML
0.2 INJECTION, SOLUTION INTRAMUSCULAR; INTRAVENOUS; SUBCUTANEOUS
Status: DISCONTINUED | OUTPATIENT
Start: 2021-04-20 | End: 2021-04-20 | Stop reason: HOSPADM

## 2021-04-20 RX ORDER — NEOSTIGMINE METHYLSULFATE 1 MG/ML
INJECTION, SOLUTION INTRAVENOUS PRN
Status: DISCONTINUED | OUTPATIENT
Start: 2021-04-20 | End: 2021-04-20 | Stop reason: SURG

## 2021-04-20 RX ORDER — OXYCODONE HCL 5 MG/5 ML
10 SOLUTION, ORAL ORAL
Status: DISCONTINUED | OUTPATIENT
Start: 2021-04-20 | End: 2021-04-20 | Stop reason: HOSPADM

## 2021-04-20 RX ORDER — SODIUM CHLORIDE, SODIUM LACTATE, POTASSIUM CHLORIDE, CALCIUM CHLORIDE 600; 310; 30; 20 MG/100ML; MG/100ML; MG/100ML; MG/100ML
INJECTION, SOLUTION INTRAVENOUS CONTINUOUS
Status: DISCONTINUED | OUTPATIENT
Start: 2021-04-20 | End: 2021-04-20 | Stop reason: HOSPADM

## 2021-04-20 RX ORDER — DEXAMETHASONE SODIUM PHOSPHATE 4 MG/ML
INJECTION, SOLUTION INTRA-ARTICULAR; INTRALESIONAL; INTRAMUSCULAR; INTRAVENOUS; SOFT TISSUE PRN
Status: DISCONTINUED | OUTPATIENT
Start: 2021-04-20 | End: 2021-04-20 | Stop reason: SURG

## 2021-04-20 RX ORDER — MIDAZOLAM HYDROCHLORIDE 1 MG/ML
INJECTION INTRAMUSCULAR; INTRAVENOUS
Status: COMPLETED
Start: 2021-04-20 | End: 2021-04-20

## 2021-04-20 RX ORDER — HALOPERIDOL 5 MG/ML
1 INJECTION INTRAMUSCULAR
Status: DISCONTINUED | OUTPATIENT
Start: 2021-04-20 | End: 2021-04-20 | Stop reason: HOSPADM

## 2021-04-20 RX ADMIN — MIDAZOLAM 2 MG: 1 INJECTION INTRAMUSCULAR; INTRAVENOUS at 10:23

## 2021-04-20 RX ADMIN — EPHEDRINE SULFATE 5 MG: 50 INJECTION INTRAMUSCULAR; INTRAVENOUS; SUBCUTANEOUS at 11:20

## 2021-04-20 RX ADMIN — ONDANSETRON 4 MG: 2 INJECTION INTRAMUSCULAR; INTRAVENOUS at 11:20

## 2021-04-20 RX ADMIN — LEVOTHYROXINE SODIUM 100 MCG: 0.05 TABLET ORAL at 05:17

## 2021-04-20 RX ADMIN — DEXAMETHASONE SODIUM PHOSPHATE 5 MG: 4 INJECTION, SOLUTION INTRA-ARTICULAR; INTRALESIONAL; INTRAMUSCULAR; INTRAVENOUS; SOFT TISSUE at 11:20

## 2021-04-20 RX ADMIN — CEFEPIME 2 G: 2 INJECTION, POWDER, FOR SOLUTION INTRAVENOUS at 05:17

## 2021-04-20 RX ADMIN — NEOSTIGMINE METHYLSULFATE 2.5 MG: 1 INJECTION INTRAVENOUS at 12:28

## 2021-04-20 RX ADMIN — LORAZEPAM 0.5 MG: 1 TABLET ORAL at 04:10

## 2021-04-20 RX ADMIN — SODIUM CHLORIDE: 9 INJECTION, SOLUTION INTRAVENOUS at 23:29

## 2021-04-20 RX ADMIN — DIBASIC SODIUM PHOSPHATE, MONOBASIC POTASSIUM PHOSPHATE AND MONOBASIC SODIUM PHOSPHATE 250 MG: 852; 155; 130 TABLET ORAL at 16:58

## 2021-04-20 RX ADMIN — ROCURONIUM BROMIDE 3 MG: 10 INJECTION, SOLUTION INTRAVENOUS at 10:25

## 2021-04-20 RX ADMIN — CEFEPIME 2 G: 2 INJECTION, POWDER, FOR SOLUTION INTRAVENOUS at 16:58

## 2021-04-20 RX ADMIN — INSULIN HUMAN 2 UNITS: 100 INJECTION, SOLUTION PARENTERAL at 17:05

## 2021-04-20 RX ADMIN — ROCURONIUM BROMIDE 35 MG: 10 INJECTION, SOLUTION INTRAVENOUS at 10:32

## 2021-04-20 RX ADMIN — ENOXAPARIN SODIUM 40 MG: 40 INJECTION SUBCUTANEOUS at 04:10

## 2021-04-20 RX ADMIN — Medication 100 MG: at 10:25

## 2021-04-20 RX ADMIN — GADOTERIDOL 15 ML: 279.3 INJECTION, SOLUTION INTRAVENOUS at 13:05

## 2021-04-20 RX ADMIN — GLYCOPYRROLATE 0.5 MG: 0.2 INJECTION INTRAMUSCULAR; INTRAVENOUS at 12:22

## 2021-04-20 RX ADMIN — POTASSIUM CHLORIDE 20 MEQ: 1500 TABLET, EXTENDED RELEASE ORAL at 05:17

## 2021-04-20 RX ADMIN — OMEPRAZOLE 20 MG: 20 CAPSULE, DELAYED RELEASE ORAL at 04:10

## 2021-04-20 RX ADMIN — POTASSIUM CHLORIDE 40 MEQ: 1500 TABLET, EXTENDED RELEASE ORAL at 16:58

## 2021-04-20 RX ADMIN — DIBASIC SODIUM PHOSPHATE, MONOBASIC POTASSIUM PHOSPHATE AND MONOBASIC SODIUM PHOSPHATE 250 MG: 852; 155; 130 TABLET ORAL at 04:11

## 2021-04-20 RX ADMIN — PROPOFOL 150 MG: 10 INJECTION, EMULSION INTRAVENOUS at 10:25

## 2021-04-20 ASSESSMENT — ENCOUNTER SYMPTOMS
SHORTNESS OF BREATH: 0
NERVOUS/ANXIOUS: 0
NAUSEA: 0
DIZZINESS: 0
ABDOMINAL PAIN: 1
BRUISES/BLEEDS EASILY: 0
FOCAL WEAKNESS: 0
SPEECH CHANGE: 0
FEVER: 0
VOMITING: 0
SENSORY CHANGE: 0
HEADACHES: 0
DIARRHEA: 0
WEAKNESS: 0

## 2021-04-20 ASSESSMENT — PAIN DESCRIPTION - PAIN TYPE: TYPE: ACUTE PAIN

## 2021-04-20 NOTE — DISCHARGE PLANNING
Anticipated Discharge Disposition:   TBD    Action:   This RN CM is following the case. Per rounds today, Pt is scheduled for an MRI.    Barriers to Discharge:   Pending medical clearance    Plan:   Will continue to assist Pt with discharge as needed

## 2021-04-20 NOTE — PROGRESS NOTES
Bedside report received.  Assessment complete.  A&O x 4. Patient calls appropriately.  Patient ambulates with no assist.   Patient has 0/10 pain.  Denies N&V. Tolerating diet.  Biopsy site, CDI.  + void  Patient denies SOB.  Patient pleasant with staff and resting in bed.  Review plan with of care with patient. Call light and personal belongings with in reach. Hourly rounding in place. All needs met at this time.

## 2021-04-20 NOTE — ANESTHESIA PROCEDURE NOTES
Airway    Date/Time: 4/20/2021 10:27 AM  Performed by: Juan Marinelli M.D.  Authorized by: Juan Marinelli M.D.     Location:  OR  Urgency:  Elective  Indications for Airway Management:  Anesthesia      Spontaneous Ventilation: absent    Sedation Level:  Deep  Preoxygenated: Yes    Patient Position:  Sniffing  Final Airway Type:  Endotracheal airway  Final Endotracheal Airway:  ETT  Cuffed: Yes    Technique Used for Successful ETT Placement:  Direct laryngoscopy  Devices/Methods Used in Placement:  Cricoid pressure and intubating stylet    Insertion Site:  Oral  Blade Type:  Carter  Laryngoscope Blade/Videolaryngoscope Blade Size:  2  ETT Size (mm):  7.0  Measured from:  Teeth  ETT to Teeth (cm):  20  Placement Verified by: auscultation and capnometry    Cormack-Lehane Classification:  Grade I - full view of glottis  Number of Attempts at Approach:  1

## 2021-04-20 NOTE — OR NURSING
Patient remains NPO; moist mouth swabs given  Patient on O2 @ 3 l/m via nasal cannula  Denies pain  [facility]  O2 tank > 50% full  Pertinent post op orders reviewed with receiving RN

## 2021-04-20 NOTE — PROGRESS NOTES
MRI NURSING NOTES:    S/w bedside RNMarika.  Pt is a/ox4 to sign own consent. NPO since MN.  Today's K 2.8.  Will discuss c anesth for possible iSTAT.  Per CORINE aHirston, hospitalist aware and added Kcl 40mEq TID.

## 2021-04-20 NOTE — ANESTHESIA PREPROCEDURE EVALUATION
Lymphoma  Greater than ideal weight  Abdominal pain    Relevant Problems   NEURO   (+) History of DVT (deep vein thrombosis)      ENDO   (+) Hypothyroidism   (+) Type 2 diabetes mellitus with hyperglycemia, with long-term current use of insulin (HCC)       Physical Exam    Airway   Mallampati: II  TM distance: >3 FB  Neck ROM: full       Cardiovascular - normal exam  Rhythm: regular  Rate: normal  (-) murmur     Dental - normal exam           Pulmonary - normal exam  Breath sounds clear to auscultation     Abdominal    Neurological - normal exam         Other findings: Loose lower incissor            Anesthesia Plan    ASA 2       Plan - general       Airway plan will be ETT          Induction: intravenous    Postoperative Plan: Postoperative administration of opioids is intended.    Pertinent diagnostic labs and testing reviewed    Informed Consent:    Anesthetic plan and risks discussed with patient.    Use of blood products discussed with: patient whom consented to blood products.

## 2021-04-20 NOTE — CARE PLAN
Problem: Communication  Goal: The ability to communicate needs accurately and effectively will improve  Outcome: PROGRESSING AS EXPECTED  Note: Patient updated on POC, all questions answered at this time.     Problem: Safety  Goal: Will remain free from injury  Outcome: PROGRESSING AS EXPECTED  Note: Patient up self, ambulating, steady gait.

## 2021-04-20 NOTE — ANESTHESIA TIME REPORT
Anesthesia Start and Stop Event Times     Date Time Event    4/20/2021 1017 Ready for Procedure     1017 Anesthesia Start     1237 Anesthesia Stop        Responsible Staff  04/20/21    Name Role Begin End    Juan Marinelli M.D. Anesth 1017 1237        Preop Diagnosis (Free Text):  Pre-op Diagnosis             Preop Diagnosis (Codes):    Post op Diagnosis  Abdominal mass      Premium Reason  Non-Premium    Comments:

## 2021-04-20 NOTE — CARE PLAN
Problem: Communication  Goal: The ability to communicate needs accurately and effectively will improve  Outcome: PROGRESSING AS EXPECTED  Discussed POC with Patient. All questions answered at this time.      Problem: Safety  Goal: Will remain free from injury  Outcome: PROGRESSING AS EXPECTED  Bed locked and in the lowest position. Call light within reach.

## 2021-04-20 NOTE — PROGRESS NOTES
"Pt A&O x 4. Fatigued at this time.     Vitals: /63   Pulse 89   Temp 37.3 °C (99.1 °F) (Temporal)   Resp 18   Ht 1.67 m (5' 5.75\")   Wt 81.9 kg (180 lb 8.9 oz)   LMP 11/01/2013   SpO2 91%   Breastfeeding No   BMI 29.37 kg/m²      Pt rates pain 0 out of 10. Declines additional interventions at this time.      Neuro: RODRIGUEZ. Denies new onset of numbness/ tingling.     Cardiac: Denies new onset of chest pain.     Vascular: Pulses 2+ BUE, BLE. No edema noted.     Respiratory: Lungs sound clear/diminished to auscultation. On 1L of O2 via silicone nasal cannula.  on, satting in 90's. Denies SOB. C/o dry cough.     GI: Abdomen soft, non-tender. Hyperactive bowel sounds, + flatus, + BM. BMs are loose per patient. Denies nausea/ vomiting. Patient to be NPO at midnight.     : Pt voiding adequately.      MSK: Pt up to bathroom up self with a steady gait, tolerating well.     Integumentary: R upper chest port, not accessed, skin intact. Lower abdominal biopsy site, dressing in place, CDI.      Labs noted.     Fall precautions in place: Bed locked in lowest position, Upper bed rails up, treaded socks in place, personal belongings within reach, call light within reach, appropriate mobility signs in place, - bed alarm. Pt calls appropriately.      Pt updated on POC.    "

## 2021-04-20 NOTE — PROGRESS NOTES
[facility] Medicine Daily Progress Note    Date of Service  4/20/2021    Chief Complaint  56 y.o. female admitted 4/11/2021 with pelvic mass.    Hospital Course  Admitted with pelvic mass, known history of non-Hodgkin's lymphoma.  She was started on empiric coverage with IV Rocephin and Flagyl.  Oncology was consulted on the case and recommended biopsy of the possible mass.  CT-guided retroperitoneal biopsy was done on 4/14/2021.  Pathology results showed several small fibrous course with areas of necrosis and area of acute inflammation, no lymph node tissue identified, no unequivocal malignancy identified.  She also had episodes of fever during this time.  Her antibiotics were changed to IV cefepime and Flagyl and her fever has resolved.    Interval Problem Update  Pelvis mass -underwent MRI of the pelvis but no final read has been done yet.  Remains afebrile and no new culture data at this time. She feels that her abdominal pain has decreased considerably with no associated nausea/vomiting.     Diabetes - BS rang around 150.     Consultants/Specialty  Surgery  Oncology    Code Status  Full Code    Disposition  Home possibly in a few days    Review of Systems  Review of Systems   Constitutional: Negative for fever and malaise/fatigue.   HENT: Negative for hearing loss.    Respiratory: Negative for shortness of breath.    Cardiovascular: Negative for chest pain.   Gastrointestinal: Positive for abdominal pain (greatly diminished). Negative for diarrhea, nausea and vomiting.   Genitourinary: Negative for dysuria and frequency.   Skin: Negative for itching and rash.   Neurological: Negative for dizziness, sensory change, speech change, focal weakness, weakness and headaches.   Endo/Heme/Allergies: Negative for environmental allergies. Does not bruise/bleed easily.   Psychiatric/Behavioral: The patient is not nervous/anxious.    All other systems reviewed and are negative.       Physical Exam  Temp:  [36.2 °C (97.1  °F)-37.3 °C (99.1 °F)] 36.7 °C (98 °F)  Pulse:  [82-89] 82  Resp:  [16-24] 18  BP: (120-146)/(60-73) 125/73  SpO2:  [91 %-100 %] 95 %    Physical Exam  Vitals and nursing note reviewed.   Constitutional:       Appearance: She is ill-appearing.   HENT:      Head: Normocephalic and atraumatic.      Nose: No congestion.      Mouth/Throat:      Mouth: Mucous membranes are moist.   Eyes:      Extraocular Movements: Extraocular movements intact.      Conjunctiva/sclera: Conjunctivae normal.      Pupils: Pupils are equal, round, and reactive to light.   Cardiovascular:      Rate and Rhythm: Normal rate and regular rhythm.   Pulmonary:      Effort: Pulmonary effort is normal.      Breath sounds: Normal breath sounds.   Abdominal:      General: Bowel sounds are normal. There is no distension.      Palpations: Abdomen is soft. There is mass.      Tenderness: There is abdominal tenderness (Suprapubic, mild, possibly palpable mass). There is no guarding or rebound.   Musculoskeletal:      Cervical back: No tenderness.      Right lower leg: No edema.      Left lower leg: No edema.   Skin:     General: Skin is warm and dry.   Neurological:      General: No focal deficit present.      Mental Status: She is alert and oriented to person, place, and time.      Cranial Nerves: No cranial nerve deficit.         Fluids    Intake/Output Summary (Last 24 hours) at 4/20/2021 1617  Last data filed at 4/20/2021 1355  Gross per 24 hour   Intake 1650 ml   Output 0 ml   Net 1650 ml       Laboratory  Recent Labs     04/18/21  0507 04/19/21  0207   WBC 7.3 7.6   RBC 3.73* 3.64*   HEMOGLOBIN 10.8* 10.5*   HEMATOCRIT 33.2* 32.0*   MCV 89.0 87.9   MCH 29.0 28.8   MCHC 32.5* 32.8*   RDW 44.3 43.8   PLATELETCT 146* 173   MPV 10.6 9.9     Recent Labs     04/18/21  0507 04/19/21  0207 04/20/21  0148   SODIUM 131* 138 134*   POTASSIUM 3.2* 3.2* 2.8*   CHLORIDE 98 101 96   CO2 24 28 28   GLUCOSE 159* 163* 160*   BUN 6* 6* 6*   CREATININE 0.68 0.68 0.57    CALCIUM 7.9* 8.2* 8.2*                   Imaging  IR-US GUIDED PIV   Final Result    Ultrasound-guided PERIPHERAL IV INSERTION performed by    qualified nursing staff as above.      CT-NEEDLE BX-ABD-RETROPERITONL   Final Result      1.  CT GUIDED Right RETROPERITONEAL BIOPSY.      OUTSIDE IMAGES-CT ABDOMEN /PELVIS   Final Result      OUTSIDE IMAGES-DX CHEST   Final Result      MR-ABDOMEN-WITH & W/O    (Results Pending)   MR-PELVIS-WITH & W/O AND SEQUENCES    (Results Pending)        Assessment/Plan  * Other intra-abdominal and pelvic swelling, mass and lump- (present on admission)  Assessment & Plan  IV Cefepime only now, remains afebrile, symptomatic improvement noted  CT-guided right retroperitoneal biopsy 4/14/2021 - Several small fibrous course with areas of necrosis and area of acute inflammation. No lymph node tissue identified. No unequivocal malignancy identified.     For MRI abdomen and pelvis with contrast, finished today, awaiting final read  -?suspect superimposed infection, still no clear etiology at this time    Non-Hodgkin lymphoma (HCC)- (present on admission)  Assessment & Plan  Oncology follow up as outpatient    Sepsis (HCC)- (present on admission)  Assessment & Plan  resolved      Hypophosphatemia- (present on admission)  Assessment & Plan  Start Kphos    Hypomagnesemia- (present on admission)  Assessment & Plan  IV Mg given  Follow level    Hypokalemia- (present on admission)  Assessment & Plan  Kdur   follow BMP    Normocytic anemia- (present on admission)  Assessment & Plan  Follow cbc    Thrombocytopenia (HCC)- (present on admission)  Assessment & Plan  Follow CBC     Type 2 diabetes mellitus with hyperglycemia, with long-term current use of insulin (HCC)- (present on admission)  Assessment & Plan  SSI    History of DVT (deep vein thrombosis)- (present on admission)  Assessment & Plan  Hold Xarelto    Hypothyroidism- (present on admission)  Assessment & Plan  Synthroid       VTE  prophylaxis: Lovenox

## 2021-04-21 ENCOUNTER — PATIENT OUTREACH (OUTPATIENT)
Dept: HEALTH INFORMATION MANAGEMENT | Facility: OTHER | Age: 56
End: 2021-04-21

## 2021-04-21 ENCOUNTER — PHARMACY VISIT (OUTPATIENT)
Dept: PHARMACY | Facility: MEDICAL CENTER | Age: 56
End: 2021-04-21
Payer: COMMERCIAL

## 2021-04-21 VITALS
HEIGHT: 66 IN | OXYGEN SATURATION: 91 % | RESPIRATION RATE: 18 BRPM | HEART RATE: 77 BPM | WEIGHT: 180.56 LBS | BODY MASS INDEX: 29.02 KG/M2 | DIASTOLIC BLOOD PRESSURE: 72 MMHG | SYSTOLIC BLOOD PRESSURE: 132 MMHG | TEMPERATURE: 98.1 F

## 2021-04-21 LAB
ANION GAP SERPL CALC-SCNC: 11 MMOL/L (ref 7–16)
BUN SERPL-MCNC: 11 MG/DL (ref 8–22)
CALCIUM SERPL-MCNC: 8.2 MG/DL (ref 8.5–10.5)
CHLORIDE SERPL-SCNC: 99 MMOL/L (ref 96–112)
CO2 SERPL-SCNC: 26 MMOL/L (ref 20–33)
CREAT SERPL-MCNC: 0.68 MG/DL (ref 0.5–1.4)
GLUCOSE BLD-MCNC: 145 MG/DL (ref 65–99)
GLUCOSE BLD-MCNC: 163 MG/DL (ref 65–99)
GLUCOSE SERPL-MCNC: 225 MG/DL (ref 65–99)
POTASSIUM SERPL-SCNC: 3.5 MMOL/L (ref 3.6–5.5)
SODIUM SERPL-SCNC: 136 MMOL/L (ref 135–145)

## 2021-04-21 PROCEDURE — 82962 GLUCOSE BLOOD TEST: CPT

## 2021-04-21 PROCEDURE — A9270 NON-COVERED ITEM OR SERVICE: HCPCS | Performed by: INTERNAL MEDICINE

## 2021-04-21 PROCEDURE — 36415 COLL VENOUS BLD VENIPUNCTURE: CPT

## 2021-04-21 PROCEDURE — 700111 HCHG RX REV CODE 636 W/ 250 OVERRIDE (IP): Performed by: FAMILY MEDICINE

## 2021-04-21 PROCEDURE — 700105 HCHG RX REV CODE 258: Performed by: FAMILY MEDICINE

## 2021-04-21 PROCEDURE — 700102 HCHG RX REV CODE 250 W/ 637 OVERRIDE(OP): Performed by: INTERNAL MEDICINE

## 2021-04-21 PROCEDURE — 700102 HCHG RX REV CODE 250 W/ 637 OVERRIDE(OP): Performed by: FAMILY MEDICINE

## 2021-04-21 PROCEDURE — A9270 NON-COVERED ITEM OR SERVICE: HCPCS | Performed by: FAMILY MEDICINE

## 2021-04-21 PROCEDURE — RXMED WILLOW AMBULATORY MEDICATION CHARGE: Performed by: INTERNAL MEDICINE

## 2021-04-21 PROCEDURE — A9270 NON-COVERED ITEM OR SERVICE: HCPCS | Performed by: STUDENT IN AN ORGANIZED HEALTH CARE EDUCATION/TRAINING PROGRAM

## 2021-04-21 PROCEDURE — 80048 BASIC METABOLIC PNL TOTAL CA: CPT

## 2021-04-21 PROCEDURE — 99239 HOSP IP/OBS DSCHRG MGMT >30: CPT | Performed by: INTERNAL MEDICINE

## 2021-04-21 PROCEDURE — 700102 HCHG RX REV CODE 250 W/ 637 OVERRIDE(OP): Performed by: STUDENT IN AN ORGANIZED HEALTH CARE EDUCATION/TRAINING PROGRAM

## 2021-04-21 RX ORDER — POTASSIUM CHLORIDE 20 MEQ/1
40 TABLET, EXTENDED RELEASE ORAL DAILY
Qty: 10 TABLET | Refills: 0 | Status: SHIPPED | OUTPATIENT
Start: 2021-04-21

## 2021-04-21 RX ADMIN — INSULIN HUMAN 1 UNITS: 100 INJECTION, SOLUTION PARENTERAL at 06:11

## 2021-04-21 RX ADMIN — CEFEPIME 2 G: 2 INJECTION, POWDER, FOR SOLUTION INTRAVENOUS at 05:58

## 2021-04-21 RX ADMIN — ENOXAPARIN SODIUM 40 MG: 40 INJECTION SUBCUTANEOUS at 05:58

## 2021-04-21 RX ADMIN — OMEPRAZOLE 20 MG: 20 CAPSULE, DELAYED RELEASE ORAL at 05:58

## 2021-04-21 RX ADMIN — POTASSIUM CHLORIDE 40 MEQ: 1500 TABLET, EXTENDED RELEASE ORAL at 13:17

## 2021-04-21 RX ADMIN — POTASSIUM CHLORIDE 40 MEQ: 1500 TABLET, EXTENDED RELEASE ORAL at 05:58

## 2021-04-21 RX ADMIN — DIBASIC SODIUM PHOSPHATE, MONOBASIC POTASSIUM PHOSPHATE AND MONOBASIC SODIUM PHOSPHATE 250 MG: 852; 155; 130 TABLET ORAL at 05:58

## 2021-04-21 RX ADMIN — LEVOTHYROXINE SODIUM 100 MCG: 0.05 TABLET ORAL at 05:58

## 2021-04-21 NOTE — DISCHARGE INSTRUCTIONS
Discharge Instructions    Discharged to home by car with relative. Discharged via wheelchair, hospital escort: Yes.  Special equipment needed: Not Applicable    Be sure to schedule a follow-up appointment with your primary care doctor or any specialists as instructed.     Discharge Plan:        I understand that a diet low in cholesterol, fat, and sodium is recommended for good health. Unless I have been given specific instructions below for another diet, I accept this instruction as my diet prescription.   Other diet: regular    Special Instructions: None    · Is patient discharged on Warfarin / Coumadin?   No     Depression / Suicide Risk    As you are discharged from this [facility] facility, it is important to learn how to keep safe from harming yourself.    Recognize the warning signs:  · Abrupt changes in personality, positive or negative- including increase in energy   · Giving away possessions  · Change in eating patterns- significant weight changes-  positive or negative  · Change in sleeping patterns- unable to sleep or sleeping all the time   · Unwillingness or inability to communicate  · Depression  · Unusual sadness, discouragement and loneliness  · Talk of wanting to die  · Neglect of personal appearance   · Rebelliousness- reckless behavior  · Withdrawal from people/activities they love  · Confusion- inability to concentrate     If you or a loved one observes any of these behaviors or has concerns about self-harm, here's what you can do:  · Talk about it- your feelings and reasons for harming yourself  · Remove any means that you might use to hurt yourself (examples: pills, rope, extension cords, firearm)  · Get professional help from the community (Mental Health, Substance Abuse, psychological counseling)  · Do not be alone:Call your Safe Contact- someone whom you trust who will be there for you.  · Call your local CRISIS HOTLINE 858-5953 or 661-574-8412  · Call your local Children's Mobile Crisis  Response Team [facility] (049) 481-5189 or www.Borderfree  · Call the toll free National Suicide Prevention Hotlines   · National Suicide Prevention Lifeline 180-819-JPYY (1618)  · [employer] Hope Line Network 800-SUICIDE (105-6162)      Pelvic Mass, Female    A pelvic mass is an abnormal growth in the pelvis. The pelvis is the area between your hip bones. It includes the bladder, rectum, uterus, and ovaries. A pelvic mass may be found during a routine pelvic exam or while performing an MRI, CT scan, or ultrasound for other problems of the abdomen.  What are common types of pelvic masses?  Pelvic masses include:  · Ovarian cysts. These are fluid-filled sacs that form on an ovary.  · Tumors. These may be cancerous (malignant) or noncancerous (benign). Noncancerous tumors in the uterus are called uterine fibroids.  · Ectopic pregnancy. This is when the fertilized egg attaches (implants) outside the uterus.  · Infections.  What type of testing may be needed?  Your health care provider may recommend that you have tests to diagnose the cause of the pelvic mass. The following tests may be done if a pelvic mass is found:  · Physical exam.  · Blood tests.  · X-rays.  · Ultrasound.  · CT scan.  · MRI.  · A surgery to look inside your abdomen with cameras (laparoscopy).  · A biopsy that is performed with a needle or during laparoscopy or surgery.  In some cases, what seemed like a pelvic mass may actually be something else, such as a mass in one of the organs that is near the pelvis, an infection (abscess), or scar tissue (adhesions) that formed after a surgery.  Tests and physical exams may be done once, or they may be done regularly for a period of time. Tests and exams that are done regularly will help monitor whether the mass or tissue change is growing and becoming a concern.  What are common treatments?  Treatment is not always needed for this condition. Your health care provider may recommend careful monitoring  (watchful waiting) and regular tests and exams. Treatment will depend on the cause of the mass.  Follow these instructions at home:  · What you need to do at home will depend on the cause of the mass. Follow the instructions that your health care provider gives to you. In general:  ? Keep all follow-up visits as directed by your health care provider. This is important.  ? Take over-the-counter and prescription medicines only as directed by your health care provider.  ? If you were prescribed an antibiotic medicine, take it as told by your health care provider. Do not stop taking the antibiotic even if you start to feel better.  ? Follow any restrictions that are given to you by your health care provider.  · Try to stay calm, and be sure to ask questions. Make sure you understand the recommendations for monitoring and whether there is a reason for concern.  Contact a health care provider if you:  · Develop new symptoms.  · Note changes in the size, shape, or position of your mass.  · Are unable to have a bowel movement.  · Bruise or bleed easily.  Get help right away if you:  · Vomit bright red blood or vomit material that looks like coffee grounds.  · Have blood in your stools, or the stools turn black and tarry.  · Have an abnormal or increased amount of vaginal bleeding.  · Have a fever or chills.  · Develop sudden or worsening pain that is not relieved by medicine.  · Feel dizzy or weak.  · Feel light-headed or you faint.  · Feel that the mass has suddenly gotten larger.  · Develop severe bloating in your abdomen or your pelvis.  · Cannot pass any urine.  Summary  · A pelvic mass is an abnormal growth in the pelvis. The pelvis is the area between your hip bones. It includes the bladder, rectum, uterus, and ovaries.  · Pelvic masses include ovarian cysts, tumors, ectopic pregnancy, or infections.  · Your health care provider may recommend that you have tests to diagnose the cause of the pelvic mass.  · Treatment  will depend on the cause of the mass.  This information is not intended to replace advice given to you by your health care provider. Make sure you discuss any questions you have with your health care provider.  Document Released: 03/26/2008 Document Revised: 01/09/2019 Document Reviewed: 01/09/2019  Elsevier Patient Education © 2020 [employer]

## 2021-04-21 NOTE — PROGRESS NOTES
Bedside report completed, assumed pt care.  Pt resting in bed, A&Ox4.  Assessment complete.   Pt has small biopsy site to abdomen, dressing in place, CDI   Lung sounds clear upon auscultation   is in use  Pt tolerating  diet  Normo active bowel sounds x 4 quadrants upon auscultation.   Last BM today per pt  + flatus  Pt educated on diet, medications, POC  No complaints of pain. Resting comfortably  Pt is fatigued at this time  Pt denies numbness, tingling, chest pain, SOB and nausea.   Discussed plan of care with pt.   All questions answered.   Call light within reach, bed in low position, possessions within reach, treaded socks on, floor free from trip hazard, Hourly rounding in place.   SCDs refused at this time despite education.

## 2021-04-21 NOTE — CARE PLAN
Problem: Bowel/Gastric:  Goal: Normal bowel function is maintained or improved  Outcome: PROGRESSING AS EXPECTED  Note: Tolerating diet at this time  Denies nausea  BM today per pt  Normo active bs x 4 quadrants     Problem: Pain Management  Goal: Pain level will decrease to patient's comfort goal  Outcome: PROGRESSING AS EXPECTED  Note: Denies any pain at this time, resting comfortably.

## 2021-04-21 NOTE — CARE PLAN
Problem: Communication  Goal: The ability to communicate needs accurately and effectively will improve  Outcome: PROGRESSING AS EXPECTED  Note: Patient is uses call light and is able to voice concerns and questions.      Problem: Pain Management  Goal: Pain level will decrease to patient's comfort goal  Outcome: PROGRESSING AS EXPECTED  Flowsheets (Taken 4/21/2021 0600 by Clay Rapp R.N.)  Pain Rating Scale (NPRS): 0  Note: Patient reports no pain

## 2021-04-22 NOTE — DISCHARGE PLANNING
Meds-to-Beds: Discharge prescription orders listed below delivered to patient's bedside. RN Ada notified. Patient counseled. Patient elected to have co-payment billed to patient account.      Haroldo Moreno   Home Medication Instructions DANIELA:[id number]    Printed on:04/21/21 1812   Medication Information                      potassium chloride SA (KDUR) 20 MEQ Tab CR  Take 2 Tablets by mouth every day.                 Odette Cuba, PharmD

## 2021-04-22 NOTE — DISCHARGE SUMMARY
Discharge Summary    CHIEF COMPLAINT ON ADMISSION  No chief complaint on file.      Reason for Admission  Abdominal pain     Admission Date  4/11/2021    CODE STATUS  Prior    HPI & HOSPITAL COURSE  This is a 56 y.o. female here with above medical issues.  She was found to be in sepsis on admission with likely intra-abdominal source. Sepsis bundle was initiated and broad spectrum antibiotics were started. Outside imaging showed potential intra-abdominal mass, which was concerning given her history of lymphoma. General surgery was consulted and deemed her not a surgical candidate at this time. Antibiotics improved her clinical condition. Her outpatient xarelto was held in anticipation of biopsy. Oncology was consulted. Biopsy was not unequivocal for malignancy. Oncology recommended continuing antibiotics. We eventually got an MRI dedicated to the abdominal/pelvic region that showed likely a cystic neoplasm and given chronicity much less likely infectious in nature. I spoke with Dr Garza of oncology and she will arrange for patient to have close follow up with Dr Sue in 1-2 weeks. She will likely need a repeat biopsy outpatient. Patient is aware of this plan. Her labs normalized, her abdominal pain subsided, and all cultures remained no growth to date. She will no longer need antibiotics outpatient. She can resume her xarelto on discharge.  No notes on file    Therefore, she is discharged in fair and stable condition to home with close outpatient follow-up.    The patient met 2-midnight criteria for an inpatient stay at the time of discharge.    Discharge Date  4/21/2021    FOLLOW UP ITEMS POST DISCHARGE  F/U with Dr Sue of oncology in 1-2 weeks    DISCHARGE DIAGNOSES  Principal Problem:    Other intra-abdominal and pelvic swelling, mass and lump POA: Yes  Active Problems:    Sepsis (HCC) POA: Yes    Non-Hodgkin lymphoma (HCC) POA: Yes    History of DVT (deep vein thrombosis) POA: Yes    Type 2 diabetes mellitus  with hyperglycemia, with long-term current use of insulin (HCC) POA: Yes    Thrombocytopenia (HCC) POA: Yes    Normocytic anemia POA: Yes    Hypokalemia POA: Yes    Hypomagnesemia POA: Yes    Hypophosphatemia POA: Yes    Hypothyroidism POA: Yes  Resolved Problems:    * No resolved hospital problems. *      FOLLOW UP  [place] [address]  [phone]  Schedule an appointment as soon as possible for a visit  As needed    Pcp Not In Computer            MEDICATIONS ON DISCHARGE     Medication List      START taking these medications      Instructions   potassium chloride SA 20 MEQ Tbcr  Commonly known as: Kdur   Take 2 Tablets by mouth every day.  Dose: 40 mEq        CONTINUE taking these medications      Instructions   acetaminophen 500 MG Tabs  Commonly known as: TYLENOL   Take 500-1,000 mg by mouth as needed for Moderate Pain.  Dose: 500-1,000 mg     glipiZIDE 5 MG Tabs  Commonly known as: GLUCOTROL   Take 5 mg by mouth every morning.  Dose: 5 mg     insulin lispro 100 UNIT/ML  Commonly known as: HumaLOG   Inject  under the skin 3 times a day before meals. Indications: Type 2 Diabetes     levothyroxine 100 MCG Tabs  Commonly known as: SYNTHROID   Take 100 mcg by mouth Every morning on an empty stomach.  Dose: 100 mcg     lidocaine-prilocaine 2.5-2.5 % Crea  Commonly known as: EMLA   Apply  topically as needed. Indications: Anesthesia to a Specific Part of the Body     LORazepam 1 MG Tabs  Commonly known as: ATIVAN   Take 0.5 mg by mouth every 12 hours. Indications: Feeling Anxious, Nausea and Vomiting caused by Cancer Chemotherapy  Dose: 0.5 mg     ondansetron 4 MG Tbdp  Commonly known as: ZOFRAN ODT   Take 1 Tab by mouth every four hours as needed for Nausea (give PO if no IV route available).  Dose: 4 mg     pantoprazole 40 MG Tbec  Commonly known as: PROTONIX   Take 40 mg by mouth every day. Indications: New Rx - pt has not yet started  Dose: 40 mg      rivaroxaban 20 MG Tabs tablet  Commonly known as: Xarelto   Doctor's comments: To start after full course of 15mg BID x21 days  Take 1 Tab by mouth with dinner.  Dose: 20 mg     senna-docusate 8.6-50 MG Tabs  Commonly known as: PERICOLACE or SENOKOT S   Take 2 Tabs by mouth 1 time daily as needed for Constipation.  Dose: 2 tablet            Allergies  Allergies   Allergen Reactions   • Iodine Hives     CT dye, has a reaction even if premedicated    • Compazine      agitation   • Morphine Vomiting   • Penicillins      Reaction unknown  Tolerated ceftriaxone April 2021   • Tape Rash     Adhesive tape rxn - paper tape ok   • Vicodin [Hydrocodone-Acetaminophen] Itching       DIET  diabetic    ACTIVITY  As tolerated.  Weight bearing as tolerated    CONSULTATIONS  General surgery, oncology, IR    PROCEDURES  CT guided biopsy    MR-PELVIS-WITH & W/O AND SEQUENCES   Final Result      1.  Complex cystic mass appears separate from the right ovary on prior exams. The appendix is not visualized as on prior CT exams dating back to 2015. Findings are concerning for cystic neoplasm. Infection is unlikely given the chronicity of the mass.    Fistulous communication with adjacent small bowel loops cannot be excluded.      2.  Small nodule in the anterior pelvis may be related to patient's history of lymphoma or right lower quadrant cystic mass.      3.  Global descent of the pelvic floor.      MR-ABDOMEN-WITH & W/O   Final Result         1.  Splenomegaly      2.  Hepatic steatosis.      3.  Right renal atrophy and scarring with a duplicated collecting system.      4.  1.9 cm benign right adrenal adenoma.      5.  Small amount of perisplenic and perihepatic ascites.      6.  Mesenteric stranding without significant adenopathy.               IR-US GUIDED PIV   Final Result    Ultrasound-guided PERIPHERAL IV INSERTION performed by    qualified nursing staff as above.      CT-NEEDLE BX-ABD-RETROPERITONL   Final Result      1.  CT  GUIDED Right RETROPERITONEAL BIOPSY.      OUTSIDE IMAGES-CT ABDOMEN /PELVIS   Final Result      OUTSIDE IMAGES-DX CHEST   Final Result            LABORATORY  Lab Results   Component Value Date    SODIUM 136 04/21/2021    POTASSIUM 3.5 (L) 04/21/2021    CHLORIDE 99 04/21/2021    CO2 26 04/21/2021    GLUCOSE 225 (H) 04/21/2021    BUN 11 04/21/2021    CREATININE 0.68 04/21/2021        Lab Results   Component Value Date    WBC 7.6 04/19/2021    HEMOGLOBIN 10.5 (L) 04/19/2021    HEMATOCRIT 32.0 (L) 04/19/2021    PLATELETCT 173 04/19/2021        Total time of the discharge process exceeds 43 minutes.

## 2021-04-23 NOTE — ANESTHESIA POSTPROCEDURE EVALUATION
Patient: Haroldo Cameron    Procedure Summary     Date: 04/20/21 Room / Location: TRR / SURGERY [facility]    Anesthesia Start: 1017 Anesthesia Stop: 1237    Procedure: RECOVERY ONLY Diagnosis:     Surgeons: Recoveryonly Surgery Responsible Provider: Juan Marinelli M.D.    Anesthesia Type: general ASA Status: 2          Final Anesthesia Type: general  Last vitals  /72       Temp 98.1       Pulse 77      Resp 20       SpO2 95         Anesthesia Post Evaluation    No complications documented.     Nurse Pain Score: 0 (NPRS)

## 2021-05-26 ENCOUNTER — HOSPITAL ENCOUNTER (OUTPATIENT)
Facility: MEDICAL CENTER | Age: 56
End: 2021-05-26
Attending: INTERNAL MEDICINE
Payer: COMMERCIAL

## 2021-05-26 LAB
HAV IGM SERPL QL IA: NORMAL
HBV CORE IGM SER QL: NORMAL
HBV SURFACE AG SER QL: NORMAL
HCV AB SER QL: NORMAL
HIV 1+2 AB+HIV1 P24 AG SERPL QL IA: NORMAL

## 2021-05-26 PROCEDURE — 87389 HIV-1 AG W/HIV-1&-2 AB AG IA: CPT

## 2021-05-26 PROCEDURE — 80074 ACUTE HEPATITIS PANEL: CPT

## 2021-06-15 ENCOUNTER — PRE-ADMISSION TESTING (OUTPATIENT)
Dept: ADMISSIONS | Facility: MEDICAL CENTER | Age: 56
End: 2021-06-15
Attending: SURGERY
Payer: COMMERCIAL

## 2021-06-16 ENCOUNTER — ANESTHESIA (OUTPATIENT)
Dept: SURGERY | Facility: MEDICAL CENTER | Age: 56
End: 2021-06-16
Payer: COMMERCIAL

## 2021-06-16 ENCOUNTER — HOSPITAL ENCOUNTER (OUTPATIENT)
Facility: MEDICAL CENTER | Age: 56
End: 2021-06-16
Attending: SURGERY | Admitting: SURGERY
Payer: COMMERCIAL

## 2021-06-16 ENCOUNTER — ANESTHESIA EVENT (OUTPATIENT)
Dept: SURGERY | Facility: MEDICAL CENTER | Age: 56
End: 2021-06-16
Payer: COMMERCIAL

## 2021-06-16 VITALS
RESPIRATION RATE: 14 BRPM | TEMPERATURE: 98.1 F | HEART RATE: 99 BPM | BODY MASS INDEX: 24.77 KG/M2 | OXYGEN SATURATION: 98 % | WEIGHT: 154.1 LBS | HEIGHT: 66 IN | SYSTOLIC BLOOD PRESSURE: 124 MMHG | DIASTOLIC BLOOD PRESSURE: 73 MMHG

## 2021-06-16 DIAGNOSIS — G89.18 POST-OPERATIVE PAIN: ICD-10-CM

## 2021-06-16 PROBLEM — Z98.890 PONV (POSTOPERATIVE NAUSEA AND VOMITING): Status: ACTIVE | Noted: 2021-06-16

## 2021-06-16 PROBLEM — R11.2 PONV (POSTOPERATIVE NAUSEA AND VOMITING): Status: ACTIVE | Noted: 2021-06-16

## 2021-06-16 LAB
ANION GAP SERPL CALC-SCNC: 12 MMOL/L (ref 7–16)
BUN SERPL-MCNC: 9 MG/DL (ref 8–22)
CALCIUM SERPL-MCNC: 9.2 MG/DL (ref 8.5–10.5)
CHLORIDE SERPL-SCNC: 107 MMOL/L (ref 96–112)
CO2 SERPL-SCNC: 24 MMOL/L (ref 20–33)
CREAT SERPL-MCNC: 0.78 MG/DL (ref 0.5–1.4)
ERYTHROCYTE [DISTWIDTH] IN BLOOD BY AUTOMATED COUNT: 42.9 FL (ref 35.9–50)
EST. AVERAGE GLUCOSE BLD GHB EST-MCNC: 134 MG/DL
GLUCOSE BLD-MCNC: 154 MG/DL (ref 65–99)
GLUCOSE SERPL-MCNC: 156 MG/DL (ref 65–99)
HBA1C MFR BLD: 6.3 % (ref 4–5.6)
HCT VFR BLD AUTO: 36.9 % (ref 37–47)
HGB BLD-MCNC: 12.2 G/DL (ref 12–16)
MCH RBC QN AUTO: 27.7 PG (ref 27–33)
MCHC RBC AUTO-ENTMCNC: 33.1 G/DL (ref 33.6–35)
MCV RBC AUTO: 83.7 FL (ref 81.4–97.8)
PLATELET # BLD AUTO: 147 K/UL (ref 164–446)
PMV BLD AUTO: 9.4 FL (ref 9–12.9)
POTASSIUM SERPL-SCNC: 3.3 MMOL/L (ref 3.6–5.5)
RBC # BLD AUTO: 4.41 M/UL (ref 4.2–5.4)
SARS-COV+SARS-COV-2 AG RESP QL IA.RAPID: NOTDETECTED
SARS-COV-2 RNA RESP QL NAA+PROBE: NOTDETECTED
SODIUM SERPL-SCNC: 143 MMOL/L (ref 135–145)
SPECIMEN SOURCE: NORMAL
SPECIMEN SOURCE: NORMAL
WBC # BLD AUTO: 6.5 K/UL (ref 4.8–10.8)

## 2021-06-16 PROCEDURE — 88341 IMHCHEM/IMCYTCHM EA ADD ANTB: CPT

## 2021-06-16 PROCEDURE — 700102 HCHG RX REV CODE 250 W/ 637 OVERRIDE(OP): Performed by: ANESTHESIOLOGY

## 2021-06-16 PROCEDURE — 88331 PATH CONSLTJ SURG 1 BLK 1SPC: CPT

## 2021-06-16 PROCEDURE — U0005 INFEC AGEN DETEC AMPLI PROBE: HCPCS

## 2021-06-16 PROCEDURE — 80048 BASIC METABOLIC PNL TOTAL CA: CPT

## 2021-06-16 PROCEDURE — U0003 INFECTIOUS AGENT DETECTION BY NUCLEIC ACID (DNA OR RNA); SEVERE ACUTE RESPIRATORY SYNDROME CORONAVIRUS 2 (SARS-COV-2) (CORONAVIRUS DISEASE [COVID-19]), AMPLIFIED PROBE TECHNIQUE, MAKING USE OF HIGH THROUGHPUT TECHNOLOGIES AS DESCRIBED BY CMS-2020-01-R: HCPCS

## 2021-06-16 PROCEDURE — 160046 HCHG PACU - 1ST 60 MINS PHASE II: Performed by: SURGERY

## 2021-06-16 PROCEDURE — 160042 HCHG SURGERY MINUTES - EA ADDL 1 MIN LEVEL 5: Performed by: SURGERY

## 2021-06-16 PROCEDURE — 700105 HCHG RX REV CODE 258: Performed by: ANESTHESIOLOGY

## 2021-06-16 PROCEDURE — 700111 HCHG RX REV CODE 636 W/ 250 OVERRIDE (IP): Performed by: ANESTHESIOLOGY

## 2021-06-16 PROCEDURE — 83036 HEMOGLOBIN GLYCOSYLATED A1C: CPT

## 2021-06-16 PROCEDURE — 160047 HCHG PACU  - EA ADDL 30 MINS PHASE II: Performed by: SURGERY

## 2021-06-16 PROCEDURE — A9270 NON-COVERED ITEM OR SERVICE: HCPCS | Performed by: ANESTHESIOLOGY

## 2021-06-16 PROCEDURE — 160036 HCHG PACU - EA ADDL 30 MINS PHASE I: Performed by: SURGERY

## 2021-06-16 PROCEDURE — 700101 HCHG RX REV CODE 250: Performed by: ANESTHESIOLOGY

## 2021-06-16 PROCEDURE — 160048 HCHG OR STATISTICAL LEVEL 1-5: Performed by: SURGERY

## 2021-06-16 PROCEDURE — 502714 HCHG ROBOTIC SURGERY SERVICES: Performed by: SURGERY

## 2021-06-16 PROCEDURE — 88342 IMHCHEM/IMCYTCHM 1ST ANTB: CPT

## 2021-06-16 PROCEDURE — 502240 HCHG MISC OR SUPPLY RC 0272: Performed by: SURGERY

## 2021-06-16 PROCEDURE — 88332 PATH CONSLTJ SURG EA ADD BLK: CPT

## 2021-06-16 PROCEDURE — 501838 HCHG SUTURE GENERAL: Performed by: SURGERY

## 2021-06-16 PROCEDURE — 500868 HCHG NEEDLE, SURGI(VARES): Performed by: SURGERY

## 2021-06-16 PROCEDURE — 700101 HCHG RX REV CODE 250: Performed by: SURGERY

## 2021-06-16 PROCEDURE — 85027 COMPLETE CBC AUTOMATED: CPT

## 2021-06-16 PROCEDURE — 160025 RECOVERY II MINUTES (STATS): Performed by: SURGERY

## 2021-06-16 PROCEDURE — 160002 HCHG RECOVERY MINUTES (STAT): Performed by: SURGERY

## 2021-06-16 PROCEDURE — 82962 GLUCOSE BLOOD TEST: CPT

## 2021-06-16 PROCEDURE — 160035 HCHG PACU - 1ST 60 MINS PHASE I: Performed by: SURGERY

## 2021-06-16 PROCEDURE — 88305 TISSUE EXAM BY PATHOLOGIST: CPT

## 2021-06-16 PROCEDURE — 160031 HCHG SURGERY MINUTES - 1ST 30 MINS LEVEL 5: Performed by: SURGERY

## 2021-06-16 PROCEDURE — 87426 SARSCOV CORONAVIRUS AG IA: CPT

## 2021-06-16 PROCEDURE — 700105 HCHG RX REV CODE 258: Performed by: SURGERY

## 2021-06-16 PROCEDURE — 160009 HCHG ANES TIME/MIN: Performed by: SURGERY

## 2021-06-16 RX ORDER — ROCURONIUM BROMIDE 10 MG/ML
INJECTION, SOLUTION INTRAVENOUS PRN
Status: DISCONTINUED | OUTPATIENT
Start: 2021-06-16 | End: 2021-06-16 | Stop reason: SURG

## 2021-06-16 RX ORDER — SCOLOPAMINE TRANSDERMAL SYSTEM 1 MG/1
1 PATCH, EXTENDED RELEASE TRANSDERMAL
Status: DISCONTINUED | OUTPATIENT
Start: 2021-06-16 | End: 2021-06-16 | Stop reason: HOSPADM

## 2021-06-16 RX ORDER — LABETALOL HYDROCHLORIDE 5 MG/ML
5 INJECTION, SOLUTION INTRAVENOUS
Status: DISCONTINUED | OUTPATIENT
Start: 2021-06-16 | End: 2021-06-16 | Stop reason: HOSPADM

## 2021-06-16 RX ORDER — OXYCODONE HYDROCHLORIDE AND ACETAMINOPHEN 5; 325 MG/1; MG/1
1 TABLET ORAL EVERY 4 HOURS PRN
Qty: 30 TABLET | Refills: 0 | Status: SHIPPED | OUTPATIENT
Start: 2021-06-16 | End: 2021-06-23

## 2021-06-16 RX ORDER — CEFAZOLIN SODIUM 1 G/3ML
INJECTION, POWDER, FOR SOLUTION INTRAMUSCULAR; INTRAVENOUS PRN
Status: DISCONTINUED | OUTPATIENT
Start: 2021-06-16 | End: 2021-06-16 | Stop reason: SURG

## 2021-06-16 RX ORDER — OXYCODONE HCL 5 MG/5 ML
5 SOLUTION, ORAL ORAL
Status: COMPLETED | OUTPATIENT
Start: 2021-06-16 | End: 2021-06-16

## 2021-06-16 RX ORDER — ONDANSETRON 2 MG/ML
4 INJECTION INTRAMUSCULAR; INTRAVENOUS
Status: COMPLETED | OUTPATIENT
Start: 2021-06-16 | End: 2021-06-16

## 2021-06-16 RX ORDER — DIPHENHYDRAMINE HYDROCHLORIDE 50 MG/ML
12.5 INJECTION INTRAMUSCULAR; INTRAVENOUS
Status: DISCONTINUED | OUTPATIENT
Start: 2021-06-16 | End: 2021-06-16 | Stop reason: HOSPADM

## 2021-06-16 RX ORDER — SODIUM CHLORIDE, SODIUM LACTATE, POTASSIUM CHLORIDE, CALCIUM CHLORIDE 600; 310; 30; 20 MG/100ML; MG/100ML; MG/100ML; MG/100ML
INJECTION, SOLUTION INTRAVENOUS CONTINUOUS
Status: ACTIVE | OUTPATIENT
Start: 2021-06-16 | End: 2021-06-16

## 2021-06-16 RX ORDER — CEFOTETAN DISODIUM 2 G/20ML
INJECTION, POWDER, FOR SOLUTION INTRAMUSCULAR; INTRAVENOUS PRN
Status: DISCONTINUED | OUTPATIENT
Start: 2021-06-16 | End: 2021-06-16 | Stop reason: SURG

## 2021-06-16 RX ORDER — MEPERIDINE HYDROCHLORIDE 25 MG/ML
6.25 INJECTION INTRAMUSCULAR; INTRAVENOUS; SUBCUTANEOUS
Status: DISCONTINUED | OUTPATIENT
Start: 2021-06-16 | End: 2021-06-16 | Stop reason: HOSPADM

## 2021-06-16 RX ORDER — MIDAZOLAM HYDROCHLORIDE 1 MG/ML
INJECTION INTRAMUSCULAR; INTRAVENOUS PRN
Status: DISCONTINUED | OUTPATIENT
Start: 2021-06-16 | End: 2021-06-16 | Stop reason: SURG

## 2021-06-16 RX ORDER — HALOPERIDOL 5 MG/ML
1 INJECTION INTRAMUSCULAR
Status: DISCONTINUED | OUTPATIENT
Start: 2021-06-16 | End: 2021-06-16 | Stop reason: HOSPADM

## 2021-06-16 RX ORDER — HYDROMORPHONE HYDROCHLORIDE 1 MG/ML
0.2 INJECTION, SOLUTION INTRAMUSCULAR; INTRAVENOUS; SUBCUTANEOUS
Status: DISCONTINUED | OUTPATIENT
Start: 2021-06-16 | End: 2021-06-16 | Stop reason: HOSPADM

## 2021-06-16 RX ORDER — ONDANSETRON 2 MG/ML
INJECTION INTRAMUSCULAR; INTRAVENOUS PRN
Status: DISCONTINUED | OUTPATIENT
Start: 2021-06-16 | End: 2021-06-16 | Stop reason: SURG

## 2021-06-16 RX ORDER — SODIUM CHLORIDE, SODIUM LACTATE, POTASSIUM CHLORIDE, CALCIUM CHLORIDE 600; 310; 30; 20 MG/100ML; MG/100ML; MG/100ML; MG/100ML
INJECTION, SOLUTION INTRAVENOUS CONTINUOUS
Status: DISCONTINUED | OUTPATIENT
Start: 2021-06-16 | End: 2021-06-16 | Stop reason: HOSPADM

## 2021-06-16 RX ORDER — OXYCODONE HCL 5 MG/5 ML
10 SOLUTION, ORAL ORAL
Status: COMPLETED | OUTPATIENT
Start: 2021-06-16 | End: 2021-06-16

## 2021-06-16 RX ORDER — OXYCODONE HYDROCHLORIDE AND ACETAMINOPHEN 5; 325 MG/1; MG/1
1 TABLET ORAL EVERY 4 HOURS PRN
Qty: 30 TABLET | Refills: 0 | OUTPATIENT
Start: 2021-06-16 | End: 2021-06-23

## 2021-06-16 RX ORDER — KETOROLAC TROMETHAMINE 30 MG/ML
INJECTION, SOLUTION INTRAMUSCULAR; INTRAVENOUS PRN
Status: DISCONTINUED | OUTPATIENT
Start: 2021-06-16 | End: 2021-06-16 | Stop reason: SURG

## 2021-06-16 RX ORDER — LIDOCAINE HYDROCHLORIDE 20 MG/ML
INJECTION, SOLUTION EPIDURAL; INFILTRATION; INTRACAUDAL; PERINEURAL PRN
Status: DISCONTINUED | OUTPATIENT
Start: 2021-06-16 | End: 2021-06-16 | Stop reason: SURG

## 2021-06-16 RX ORDER — DEXAMETHASONE SODIUM PHOSPHATE 4 MG/ML
INJECTION, SOLUTION INTRA-ARTICULAR; INTRALESIONAL; INTRAMUSCULAR; INTRAVENOUS; SOFT TISSUE PRN
Status: DISCONTINUED | OUTPATIENT
Start: 2021-06-16 | End: 2021-06-16 | Stop reason: SURG

## 2021-06-16 RX ORDER — HYDRALAZINE HYDROCHLORIDE 20 MG/ML
5 INJECTION INTRAMUSCULAR; INTRAVENOUS
Status: DISCONTINUED | OUTPATIENT
Start: 2021-06-16 | End: 2021-06-16 | Stop reason: HOSPADM

## 2021-06-16 RX ORDER — BUPIVACAINE HYDROCHLORIDE AND EPINEPHRINE 5; 5 MG/ML; UG/ML
INJECTION, SOLUTION EPIDURAL; INTRACAUDAL; PERINEURAL
Status: DISCONTINUED | OUTPATIENT
Start: 2021-06-16 | End: 2021-06-16 | Stop reason: HOSPADM

## 2021-06-16 RX ORDER — HYDROMORPHONE HYDROCHLORIDE 1 MG/ML
0.4 INJECTION, SOLUTION INTRAMUSCULAR; INTRAVENOUS; SUBCUTANEOUS
Status: DISCONTINUED | OUTPATIENT
Start: 2021-06-16 | End: 2021-06-16 | Stop reason: HOSPADM

## 2021-06-16 RX ORDER — HYDROMORPHONE HYDROCHLORIDE 1 MG/ML
0.1 INJECTION, SOLUTION INTRAMUSCULAR; INTRAVENOUS; SUBCUTANEOUS
Status: DISCONTINUED | OUTPATIENT
Start: 2021-06-16 | End: 2021-06-16 | Stop reason: HOSPADM

## 2021-06-16 RX ADMIN — KETOROLAC TROMETHAMINE 30 MG: 30 INJECTION, SOLUTION INTRAMUSCULAR at 16:00

## 2021-06-16 RX ADMIN — FENTANYL CITRATE 50 MCG: 50 INJECTION, SOLUTION INTRAMUSCULAR; INTRAVENOUS at 15:05

## 2021-06-16 RX ADMIN — ONDANSETRON 4 MG: 2 INJECTION INTRAMUSCULAR; INTRAVENOUS at 16:00

## 2021-06-16 RX ADMIN — ROCURONIUM BROMIDE 30 MG: 10 INJECTION, SOLUTION INTRAVENOUS at 15:35

## 2021-06-16 RX ADMIN — CEFAZOLIN 2 G: 330 INJECTION, POWDER, FOR SOLUTION INTRAMUSCULAR; INTRAVENOUS at 14:30

## 2021-06-16 RX ADMIN — OXYCODONE HYDROCHLORIDE 5 MG: 5 SOLUTION ORAL at 18:21

## 2021-06-16 RX ADMIN — MIDAZOLAM HYDROCHLORIDE 2 MG: 1 INJECTION, SOLUTION INTRAMUSCULAR; INTRAVENOUS at 14:26

## 2021-06-16 RX ADMIN — LIDOCAINE HYDROCHLORIDE 100 MG: 20 INJECTION, SOLUTION EPIDURAL; INFILTRATION; INTRACAUDAL at 14:30

## 2021-06-16 RX ADMIN — CEFOTETAN DISODIUM 2 G: 2 INJECTION, POWDER, FOR SOLUTION INTRAMUSCULAR; INTRAVENOUS at 15:10

## 2021-06-16 RX ADMIN — SUGAMMADEX 200 MG: 100 INJECTION, SOLUTION INTRAVENOUS at 16:00

## 2021-06-16 RX ADMIN — ROCURONIUM BROMIDE 50 MG: 10 INJECTION, SOLUTION INTRAVENOUS at 14:30

## 2021-06-16 RX ADMIN — FENTANYL CITRATE 50 MCG: 50 INJECTION, SOLUTION INTRAMUSCULAR; INTRAVENOUS at 14:30

## 2021-06-16 RX ADMIN — ONDANSETRON 4 MG: 2 INJECTION INTRAMUSCULAR; INTRAVENOUS at 18:18

## 2021-06-16 RX ADMIN — SODIUM CHLORIDE, POTASSIUM CHLORIDE, SODIUM LACTATE AND CALCIUM CHLORIDE: 600; 310; 30; 20 INJECTION, SOLUTION INTRAVENOUS at 18:23

## 2021-06-16 RX ADMIN — SODIUM CHLORIDE, POTASSIUM CHLORIDE, SODIUM LACTATE AND CALCIUM CHLORIDE: 600; 310; 30; 20 INJECTION, SOLUTION INTRAVENOUS at 12:29

## 2021-06-16 RX ADMIN — FENTANYL CITRATE 50 MCG: 50 INJECTION, SOLUTION INTRAMUSCULAR; INTRAVENOUS at 16:00

## 2021-06-16 RX ADMIN — DEXAMETHASONE SODIUM PHOSPHATE 8 MG: 4 INJECTION, SOLUTION INTRA-ARTICULAR; INTRALESIONAL; INTRAMUSCULAR; INTRAVENOUS; SOFT TISSUE at 14:31

## 2021-06-16 RX ADMIN — PROPOFOL 200 MG: 10 INJECTION, EMULSION INTRAVENOUS at 14:30

## 2021-06-16 RX ADMIN — SCOPALAMINE 1 PATCH: 1 PATCH, EXTENDED RELEASE TRANSDERMAL at 14:17

## 2021-06-16 RX ADMIN — FENTANYL CITRATE 50 MCG: 50 INJECTION, SOLUTION INTRAMUSCULAR; INTRAVENOUS at 15:22

## 2021-06-16 RX ADMIN — FENTANYL CITRATE 50 MCG: 50 INJECTION, SOLUTION INTRAMUSCULAR; INTRAVENOUS at 14:55

## 2021-06-16 ASSESSMENT — PAIN DESCRIPTION - PAIN TYPE
TYPE: SURGICAL PAIN

## 2021-06-16 ASSESSMENT — FIBROSIS 4 INDEX: FIB4 SCORE: 1.12

## 2021-06-16 NOTE — OP REPORT
Surgeon: Jose Brewster MD  Assistant: Gisela Holley MD  Preoperative diagnosis: Right lower quadrant abdominal mass  Postoperative diagnosis: Right lower quadrant abdominal mass  Procedure: Robotic assisted laparoscopic peritoneal biopsy  Anesthesia: General endotracheal anesthesia  Anesthesiologist: Harley Berger MD  Indications: 56-year-old woman with a known lymphoma none noted to have a right lower quadrant mass suspicious for malignancy.  Needle biopsies were nondiagnostic.  A laparoscopic biopsy was felt to be indicated.  Narrative: The procedure was discussed in detail with the patient including the risk of bleeding, infection, abscess, and hematoma.  I also discussed the risk of injury to intraperitoneal organs, the use of the surgical robot, and the potential for converting to an open procedure.  She understood all the above and wished to proceed.  She was placed under anesthesia by Dr. Berger.  The abdomen was prepped with chlorhexidine prep and draped sterilely.  After a timeout a left subcostal incision was made and through this incision a Veress needle was placed.  Low pressure was confirmed and CO2 insufflation was used to achieve a pneumoperitoneum of 15 mmHg.  Through the same incision an 8 mm da Cheri port was placed.  Under direct visualization and 2 left lateral and a left lower quadrant da Cheri port was placed.  The robot was docked and instruments were placed.  There was an apparent mass with adherent bowel in the right lower quadrant.  The bowel was carefully mobilized off this mass with sharp dissection without the use of cautery.  The anatomy was significantly distorted.  Eventually what appeared to be tumor was identified.  A significant sample of this was obtained and a frozen section was requested.  The pathologist felt that this was consistent with mucin and not a lymphoma.  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  I  did visualize the area of the right ovary.  There was significant anatomic change in that area and a significantly distorted structure which may have been the ovary.  Conceivably, the appendix could have been involved with this as well.  I discussed this with Dr. Burger,  the gynecologic oncologist.  He recommended obtaining pictures and terminating the procedure otherwise and referring the patient to him for definitive treatment.  I did obtain the pictures and after assuring hemostasis the instruments were removed and the robot was undocked.  The pneumoperitoneum was released.  The skin and all incisions was approximated with 4-0 Vicryl sutures.  Dermabond was placed.  The patient tolerated the procedure well without apparent complication.  The final counts were reported as correct.  Wound class was class I, clean.

## 2021-06-16 NOTE — ANESTHESIA TIME REPORT
Anesthesia Start and Stop Event Times     Date Time Event    6/16/2021 1412 Ready for Procedure     1426 Anesthesia Start     1619 Anesthesia Stop        Responsible Staff  06/16/21    Name Role Begin End    Chito Berger M.D. Anesth 1426 1619        Preop Diagnosis (Free Text):  Pre-op Diagnosis     PERITONEAL MASS        Preop Diagnosis (Codes):    Post op Diagnosis  Abdominal mass      Premium Reason  A. 3PM - 7AM    Comments:

## 2021-06-16 NOTE — ANESTHESIA PROCEDURE NOTES
Airway    Date/Time: 6/16/2021 2:30 PM  Performed by: Chito Berger M.D.  Authorized by: Chito Berger M.D.     Location:  OR  Urgency:  Elective  Indications for Airway Management:  Anesthesia      Spontaneous Ventilation: absent    Sedation Level:  Deep  Preoxygenated: Yes    Patient Position:  Sniffing  Final Airway Type:  Endotracheal airway  Final Endotracheal Airway:  ETT  Cuffed: Yes    Technique Used for Successful ETT Placement:  Direct laryngoscopy    Insertion Site:  Oral  Blade Type:  Oh  Laryngoscope Blade/Videolaryngoscope Blade Size:  3  ETT Size (mm):  7.0  Measured from:  Teeth  ETT to Teeth (cm):  21  Placement Verified by: auscultation and capnometry    Cormack-Lehane Classification:  Grade I - full view of glottis  Number of Attempts at Approach:  1

## 2021-06-16 NOTE — ANESTHESIA PREPROCEDURE EVALUATION
Relevant Problems   ANESTHESIA   (positive) PONV (postoperative nausea and vomiting)      PULMONARY (within normal limits)      NEURO   (positive) History of DVT (deep vein thrombosis)      CARDIAC (within normal limits)      GI (within normal limits)       (within normal limits)      ENDO   (positive) Hypothyroidism   (positive) Type 2 diabetes mellitus with hyperglycemia, with long-term current use of insulin (HCC)       Physical Exam    Airway   Mallampati: II  TM distance: >3 FB  Neck ROM: full       Cardiovascular - normal exam  Rhythm: regular  Rate: normal  (-) murmur     Dental - normal exam           Pulmonary - normal exam  Breath sounds clear to auscultation     Abdominal    Neurological - normal exam         Other findings: Loose lower incisor             Anesthesia Plan    ASA 2       Plan - general       Airway plan will be ETT          Induction: intravenous    Postoperative Plan: Postoperative administration of opioids is intended.    Pertinent diagnostic labs and testing reviewed    Informed Consent:    Anesthetic plan and risks discussed with patient.    Use of blood products discussed with: patient whom consented to blood products.

## 2021-06-17 LAB — PATHOLOGY CONSULT NOTE: NORMAL

## 2021-06-17 ASSESSMENT — PAIN SCALES - GENERAL: PAIN_LEVEL: 2

## 2021-06-17 NOTE — ANESTHESIA POSTPROCEDURE EVALUATION
Patient: Haroldo Cameron    Procedure Summary     Date: 06/16/21 Room / Location: [place] [address] / SURGERY [facility]    Anesthesia Start: 1426 Anesthesia Stop: 1619    Procedure: LAPAROSCOPY, DIAGNOSTIC, ROBOT-ASSISTED, USING DA CATRINA XI - FOR BIOPSY OF PERITONEAL MASS (Abdomen) Diagnosis: (PERITONEAL MASS)    Surgeons: Jose Brewster M.D. Responsible Provider: Chito Berger M.D.    Anesthesia Type: general ASA Status: 2          Final Anesthesia Type: general  Last vitals  BP   Blood Pressure: 124/73    Temp   36.7 °C (98.1 °F)    Pulse   99   Resp   14    SpO2   98 %      Anesthesia Post Evaluation    Patient location during evaluation: PACU  Patient participation: complete - patient participated  Level of consciousness: awake and alert  Pain score: 2    Airway patency: patent  Anesthetic complications: no  Cardiovascular status: hemodynamically stable  Respiratory status: acceptable  Hydration status: euvolemic    PONV: none          No complications documented.     Nurse Pain Score: 2 (NPRS)

## 2021-06-17 NOTE — OR NURSING
Pt has ambulated to BR, able to void, states abdominal pain is tolerable at 2 of 10 sore/tender pain, discharge instructions and Percocet paper RX from Dr Brewster reviewed with pt and , they verbalzied understanding of instructions, PIV removed, tip intact, discharged via wheelchair to home with  at 1955.

## 2021-06-17 NOTE — OR NURSING
Pt arrived in 1740, ambulated to BR, able to void, resting in recliner chair, 4 abdominal lap site incisions approximated with derma bond, open to air, no redness, swelling or drainage present at incision sites, VSS, complains of mild nausea and 5 of 10 sore/tender abdominal pain,  and personal belongings at bedside, Zofran 4mg IV amd at 1818, Oxycodone 5mg PO adm at 1821, pt prescription pain medication not delivered prior to [facility] closing at 1700, have telephoned Dr Brewster regarding need for pain med prescription, have updated pt and , call light within reach, will monitor.

## 2021-06-17 NOTE — OR NURSING
Patient awake but sleepy and resting comfortably in bed. Pt has been updated on plan of care and all questions have been answered. Safety measures in place, bed in lowest position, pt has been educated on all safety precautions in PACU. Vital signs stable. Lap sites to abdomen are closed with dermabond and CDI. Pt family has been updated on pt status. Will continue to monitor.

## 2022-03-28 NOTE — DISCHARGE INSTRUCTIONS
ACTIVITY: Rest and take it easy for the first 24 hours.  A responsible adult is recommended to remain with you during that time.  It is normal to feel sleepy.  We encourage you to not do anything that requires balance, judgment or coordination.    MILD FLU-LIKE SYMPTOMS ARE NORMAL. YOU MAY EXPERIENCE GENERALIZED MUSCLE ACHES, THROAT IRRITATION, HEADACHE AND/OR SOME NAUSEA.    FOR 24 HOURS DO NOT:  Drive, operate machinery or run household appliances.  Drink beer or alcoholic beverages.   Make important decisions or sign legal documents.    SPECIAL INSTRUCTIONS: follow MD instructions    DIET: To avoid nausea, slowly advance diet as tolerated, avoiding spicy or greasy foods for the first day.  Add more substantial food to your diet according to your physician's instructions. INCREASE FLUIDS AND FIBER TO AVOID CONSTIPATION.    SURGICAL DRESSING/BATHING: You may shower after 24 hours. No soaking (baths, swimming, hot tubs) until cleared by MD    FOLLOW-UP APPOINTMENT:  A follow-up appointment should be arranged with your doctor; call to schedule.    You should CALL YOUR PHYSICIAN if you develop:  Fever greater than 101 degrees F.  Pain not relieved by medication, or persistent nausea or vomiting.  Excessive bleeding (blood soaking through dressing) or unexpected drainage from the wound.  Extreme redness or swelling around the incision site, drainage of pus or foul smelling drainage.  Inability to urinate or empty your bladder within 8 hours.  Problems with breathing or chest pain.    You should call 911 if you develop problems with breathing or chest pain.  If you are unable to contact your doctor or surgical center, you should go to the nearest emergency room or urgent care center.    Physician's telephone #: Dr Brewster [phone]    If any questions arise, call your doctor.  If your doctor is not available, please feel free to call the Surgical Center at [phone]. The Contact Center is open Monday through  Friday 7AM to 5PM and may speak to a nurse at [phone], or toll free at [phone].     A registered nurse may call you a few days after your surgery to see how you are doing after your procedure.    MEDICATIONS: Resume taking daily medication.  Take prescribed pain medication with food.  If no medication is prescribed, you may take non-aspirin pain medication if needed.  PAIN MEDICATION CAN BE VERY CONSTIPATING.  Take a stool softener or laxative such as senokot, pericolace, or milk of magnesia if needed.    Prescription given for Percocet.  Last pain medication given at none, you may take at anytime.    If your physician has prescribed pain medication that includes Acetaminophen (Tylenol), do not take additional Acetaminophen (Tylenol) while taking the prescribed medication.    Depression / Suicide Risk    As you are discharged from this [facility] facility, it is important to learn how to keep safe from harming yourself.    Recognize the warning signs:  · Abrupt changes in personality, positive or negative- including increase in energy   · Giving away possessions  · Change in eating patterns- significant weight changes-  positive or negative  · Change in sleeping patterns- unable to sleep or sleeping all the time   · Unwillingness or inability to communicate  · Depression  · Unusual sadness, discouragement and loneliness  · Talk of wanting to die  · Neglect of personal appearance   · Rebelliousness- reckless behavior  · Withdrawal from people/activities they love  · Confusion- inability to concentrate     If you or a loved one observes any of these behaviors or has concerns about self-harm, here's what you can do:  · Talk about it- your feelings and reasons for harming yourself  · Remove any means that you might use to hurt yourself (examples: pills, rope, extension cords, firearm)  · Get professional help from the community (Mental Health, Substance Abuse, psychological counseling)  · Do not be  alone:Call your Safe Contact- someone whom you trust who will be there for you.  · Call your local CRISIS HOTLINE 102-1976 or 182-812-3047  · Call your local Children's Mobile Crisis Response Team [place] (338) 713-0914 or www.joiz  · Call the toll free National Suicide Prevention Hotlines   · National Suicide Prevention Lifeline 033-415-MQGG (9628)  · National [place] Line Network 800-SUICIDE (805-5022)   V-Y Flap Text: The defect edges were debeveled with a #15 scalpel blade.  Given the location of the defect, shape of the defect and the proximity to free margins a V-Y flap was deemed most appropriate.  Using a sterile surgical marker, an appropriate advancement flap was drawn incorporating the defect and placing the expected incisions within the relaxed skin tension lines where possible.    The area thus outlined was incised deep to adipose tissue with a #15 scalpel blade.  The skin margins were undermined to an appropriate distance in all directions utilizing iris scissors.

## (undated) DEVICE — SYRINGE SAFETY 5 ML 18 GA X 1-1/2 BLUNT LL (100/BX 4BX/CA)

## (undated) DEVICE — OBTURATOR BLADELESS STANDARD 8MM (6EA/BX)

## (undated) DEVICE — KIT  I.V. START (100EA/CA)

## (undated) DEVICE — ELECTRODE DUAL RETURN W/ CORD - (50/PK)

## (undated) DEVICE — GOWN WARMING STANDARD FLEX - (30/CA)

## (undated) DEVICE — LACTATED RINGERS INJ 1000 ML - (14EA/CA 60CA/PF)

## (undated) DEVICE — CLOSURE SKIN STRIP 1/2 X 4 IN - (STERI STRIP) (50/BX 4BX/CA)

## (undated) DEVICE — SOD. CHL. INJ. 0.9% 250 ML - (36/CA 50CA/PF)

## (undated) DEVICE — SLEEVE, VASO, THIGH, MED

## (undated) DEVICE — SODIUM CHL IRRIGATION 0.9% 1000ML (12EA/CA)

## (undated) DEVICE — PROTECTOR ULNA NERVE - (36PR/CA)

## (undated) DEVICE — BAG RETRIEVAL 5MM (10EA/BX)

## (undated) DEVICE — PACK MINOR BASIN - (2EA/CA)

## (undated) DEVICE — GLOVE BIOGEL INDICATOR SZ 7SURGICAL PF LTX - (50/BX 4BX/CA)

## (undated) DEVICE — WATER IRRIGATION STERILE 1000ML (12EA/CA)

## (undated) DEVICE — ELECTRODE 850 FOAM ADHESIVE - HYDROGEL RADIOTRNSPRNT (50/PK)

## (undated) DEVICE — HEAD HOLDER JUNIOR/ADULT

## (undated) DEVICE — SUTURE 4-0 MONOCRYL PLUS PS-2 - 27 INCH (36/BX)

## (undated) DEVICE — CHLORAPREP 26 ML APPLICATOR - ORANGE TINT(25/CA)

## (undated) DEVICE — SET EXTENSION WITH 2 PORTS (48EA/CA) ***PART #2C8610 IS A SUBSTITUTE*****

## (undated) DEVICE — GLOVE BIOGEL SZ 8 SURGICAL PF LTX - (50PR/BX 4BX/CA)

## (undated) DEVICE — CANISTER SUCTION RIGID RED 1500CC (40EA/CA)

## (undated) DEVICE — SET LEADWIRE 5 LEAD BEDSIDE DISPOSABLE ECG (1SET OF 5/EA)

## (undated) DEVICE — TUBE CONNECTING SUCTION - CLEAR PLASTIC STERILE 72 IN (50EA/CA)

## (undated) DEVICE — SUTURE GENERAL

## (undated) DEVICE — SENSOR SPO2 NEO LNCS ADHESIVE (20/BX) SEE USER NOTES

## (undated) DEVICE — NEPTUNE 4 PORT MANIFOLD - (20/PK)

## (undated) DEVICE — MASK ANESTHESIA ADULT  - (100/CA)

## (undated) DEVICE — GELAQUASONIC 100 ULTRASOUND - 48/BX 20GM STERILE FOIL POUCH

## (undated) DEVICE — GLOVE BIOGEL PI INDICATOR SZ 7.5 SURGICAL PF LF -(50/BX 4BX/CA)

## (undated) DEVICE — SUCTION INSTRUMENT YANKAUER BULBOUS TIP W/O VENT (50EA/CA)

## (undated) DEVICE — SHEET TRANSVERSE LAP - (12EA/CA)

## (undated) DEVICE — MASK, LARYNGEAL AIRWAY #4

## (undated) DEVICE — COVER TIP ENDOWRIST HOT SHEAR - (10EA/BX) DA VINCI

## (undated) DEVICE — CANISTER SUCTION 3000ML MECHANICAL FILTER AUTO SHUTOFF MEDI-VAC NONSTERILE LF DISP  (40EA/CA)

## (undated) DEVICE — BLADE SURGICAL CLIPPER - (50EA/CA)

## (undated) DEVICE — DRAPE COLUMN  BOX OF 20

## (undated) DEVICE — NEEDLE INSFL 120MM 14GA VRRS - (20/BX)

## (undated) DEVICE — ROBOTIC SURGERY SERVICES

## (undated) DEVICE — GLOVE BIOGEL PI INDICATOR SZ 6.5 SURGICAL PF LF - (50/BX 4BX/CA)

## (undated) DEVICE — SEAL 5MM-8MM UNIVERSAL  BOX OF 10

## (undated) DEVICE — SUTURE 2-0 PROLENE SH (36PK/BX)

## (undated) DEVICE — SPONGE GAUZESTER 4 X 4 4PLY - (128PK/CA)

## (undated) DEVICE — DECANTER FLD BLS - (50/CA)

## (undated) DEVICE — KIT ANESTHESIA W/CIRCUIT & 3/LT BAG W/FILTER (20EA/CA)

## (undated) DEVICE — DRAPE ARM  BOX OF 20

## (undated) DEVICE — BLADE SURGICAL #11 - (50/BX)

## (undated) DEVICE — GLOVE SZ 6.5 BIOGEL PI MICRO - PF LF (50PR/BX)

## (undated) DEVICE — TUBING CLEARLINK DUO-VENT - C-FLO (48EA/CA)

## (undated) DEVICE — Device

## (undated) DEVICE — DRAPE C-ARM LARGE 41IN X 74 IN - (10/BX 2BX/CA)

## (undated) DEVICE — CATHETER IV 20 GA X 1-1/4 ---SURG.& SDS ONLY--- (50EA/BX)

## (undated) DEVICE — GLOVE SZ 7.5 BIOGEL PI MICRO - PF LF (50PR/BX)

## (undated) DEVICE — TOWEL STOP TIMEOUT SAFETY FLAG (40EA/CA)

## (undated) DEVICE — SUTURE 3-0 VICRYL PLUS SH - 27 INCH (36/BX)

## (undated) DEVICE — SET TUBING PNEUMOCLEAR HIGH FLOW SMOKE EVACUATION (10EA/BX)